# Patient Record
Sex: FEMALE | Race: BLACK OR AFRICAN AMERICAN | Employment: FULL TIME | URBAN - METROPOLITAN AREA
[De-identification: names, ages, dates, MRNs, and addresses within clinical notes are randomized per-mention and may not be internally consistent; named-entity substitution may affect disease eponyms.]

---

## 2018-07-18 ENCOUNTER — ED HISTORICAL/CONVERTED ENCOUNTER (OUTPATIENT)
Dept: OTHER | Age: 48
End: 2018-07-18

## 2018-08-16 ENCOUNTER — OFFICE VISIT (OUTPATIENT)
Dept: NEUROLOGY | Age: 48
End: 2018-08-16

## 2018-08-16 VITALS
HEART RATE: 85 BPM | BODY MASS INDEX: 32.07 KG/M2 | HEIGHT: 63 IN | WEIGHT: 181 LBS | TEMPERATURE: 98 F | OXYGEN SATURATION: 99 % | DIASTOLIC BLOOD PRESSURE: 72 MMHG | SYSTOLIC BLOOD PRESSURE: 120 MMHG

## 2018-08-16 DIAGNOSIS — R20.0 BILATERAL HAND NUMBNESS: ICD-10-CM

## 2018-08-16 DIAGNOSIS — M54.2 NECK PAIN: Primary | ICD-10-CM

## 2018-08-16 DIAGNOSIS — M62.838 TRAPEZIUS MUSCLE SPASM: ICD-10-CM

## 2018-08-16 DIAGNOSIS — M62.838 CERVICAL PARASPINAL MUSCLE SPASM: ICD-10-CM

## 2018-08-16 DIAGNOSIS — M54.12 CERVICAL RADICULOPATHY: ICD-10-CM

## 2018-08-16 RX ORDER — METHOCARBAMOL 750 MG/1
750 TABLET, FILM COATED ORAL AS NEEDED
COMMUNITY
End: 2020-06-09

## 2018-08-16 NOTE — PATIENT INSTRUCTIONS
Information Regarding Testing     If you have physican order for a test or a medication denied by your insurance company, this does not mean the test or medication is not appropriate for you as that is a medical decision, not a decision to be made by an insurance company representative or by an [facility] physician who has not interviewed and examined you. This is a decision to be made between you and your physician. The denial of services is a contractual matter between you and your insurance company, not an issue between your physician and the insurance company. If your test or medication is denied, you can take the following steps to help resolve the issue:    1. File a complaint with the [facility] regarding your insurance company's denial of services ordered for you. You can do this either by calling them directly or by completing an on-line complaint form on the [employer]. This can be found at www.CloudFlare    2. Also file a formal complaint with your insurance company and ask to have the name of the person denying the service so that you may explore a legal option should you be harmed by this denial of service. Again, the fact the insurance company will not pay for the service does not mean it is not medically necessary and I would encourage you to follow through with the plan that was made with your physician    3. File a written complaint with your employer so your employer and benefit manager is aware of the poor coverage they are providing their employees. If you have medicare/medicaid, complain to your representative in the House and to your Puja Gupta.     [facility]   Statement to Patients  April 1, 2014      In an effort to ensure the large volume of patient prescription refills is processed in the most efficient and expeditious manner, we are asking our patients to assist us by calling your Pharmacy for all prescription refills, this will include also your  Mail Order Pharmacy. The pharmacy will contact our office electronically to continue the refill process. Please do not wait until the last minute to call your pharmacy. We need at least 48 hours (2days) to fill prescriptions. We also encourage you to call your pharmacy before going to  your prescription to make sure it is ready. With regard to controlled substance prescription refill requests (narcotic refills) that need to be picked up at our office, we ask your cooperation by providing us with at least 72 hours (3days) notice that you will need a refill. We will not refill narcotic prescription refill requests after 4:00pm on any weekday, Monday through Thursday, or after 2:00pm on Fridays, or on the weekends. We encourage everyone to explore another way of getting your prescription refill request processed using [facility], our patient web portal through our electronic medical record system. [facility] is an efficient and effective way to communicate your medication request directly to the office and  downloadable as an anne marie on your smart phone . [facility] also features a review functionality that allows you to view your medication list as well as leave messages for your physician. Are you ready to get connected? If so please review the attatched instructions or speak to any of our staff to get you set up right away! Thank you so much for your cooperation. Should you have any questions please contact our Practice Administrator. The Physicians and Staff,  [facility]   If we have ordered testing for you, we do not call patients with results and we do not give test results over the phone. We schedule follow up appointments so that your results can be discussed in person and any questions you have regarding them may be addressed.   If something of concern is revealed on your test, we will call you for a sooner follow up appointment. Additionally, results may be found by using the My Chart feature and one of our patient service representatives at the  can give you instructions on how to access this feature of our electronic medical record system.
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
## 2018-08-16 NOTE — MR AVS SNAPSHOT
[address] [facility] [address] [facility] [phone] Patient: Krishna Saldaña MRN: [id number] JSU:[id number] Visit Information Date & Time Provider Department Dept. Phone Encounter #  
 8/16/2018 11:00 AM Keyur Soto MD [facility] Neurology [facility] [phone] [id number] Follow-up Instructions Return for After tests. Allergies as of 8/16/2018  Review Complete On: 8/16/2018 By: Keyur Soto MD  
 No Known Allergies Current Immunizations  Never Reviewed No immunizations on file. Not reviewed this visit You Were Diagnosed With   
  
 Codes Comments Neck pain    -  Primary ICD-10-CM: M54.2 ICD-9-CM: 723.1 Cervical radiculopathy     ICD-10-CM: M54.12 
ICD-9-CM: 723.4 Cervical paraspinal muscle spasm     ICD-10-CM: F37.651 ICD-9-CM: 728.85 Trapezius muscle spasm     ICD-10-CM: R31.221 ICD-9-CM: 728.85 Bilateral hand numbness     ICD-10-CM: R20.0 ICD-9-CM: 914. 0 Vitals BP Pulse Temp Height(growth percentile) Weight(growth percentile) SpO2  
 120/72 85 98 °F (36.7 °C) 5' 3\" (1.6 m) 181 lb (82.1 kg) 99% BMI OB Status Smoking Status 32.06 kg/m2 Having regular periods Never Smoker Vitals History BMI and BSA Data Body Mass Index Body Surface Area 32.06 kg/m 2 1.91 m 2 Your Updated Medication List  
  
   
This list is accurate as of 8/16/18 11:43 AM.  Always use your most recent med list.  
  
  
  
  
 HYDROcodone-acetaminophen 5-500 mg per tablet Commonly known as:  Blima Yaakov Take 1 Tab by mouth every four (4) hours as needed for Pain. methocarbamol 750 mg tablet Commonly known as:  ROBAXIN Take  by mouth four (4) times daily. OTHER Take 2,000 Units by mouth every seven (7) days. Vitamin D every friday We Performed the Following REFERRAL TO PHYSICAL THERAPY [OF3 Custom] Comments: Neck pain, cervical and trap spasm eval and treat Follow-up Instructions Return for After tests. To-Do List   
 08/16/2018 Neurology:  EMG LIMITED   
  
 08/16/2018 Imaging:  MRI CERV SPINE WO CONT Referral Information Referral ID Referred By Referred To  
  
 [id number] KASH GUTHRIE Not Available Visits Status Start Date End Date 1 New Request 8/16/18 8/16/19 If your referral has a status of pending review or denied, additional information will be sent to support the outcome of this decision. Patient Instructions Information Regarding Testing If you have physican order for a test or a medication denied by your insurance company, this does not mean the test or medication is not appropriate for you as that is a medical decision, not a decision to be made by an insurance company representative or by an [facility] physician who has not interviewed and examined you. This is a decision to be made between you and your physician. The denial of services is a contractual matter between you and your insurance company, not an issue between your physician and the insurance company. If your test or medication is denied, you can take the following steps to help resolve the issue: 1. File a complaint with the [facility] regarding your insurance company's denial of services ordered for you. You can do this either by calling them directly or by completing an on-line complaint form on the [employer]. This can be found at www.[place].gov 2. Also file a formal complaint with your insurance company and ask to have the name of the person denying the service so that you may explore a legal option should you be harmed by this denial of service.   Again, the fact the insurance company will not pay for the service does not mean it is not medically necessary and I would encourage you to follow through with the plan that was made with your physician 3. File a written complaint with your employer so your employer and benefit manager is aware of the poor coverage they are providing their employees. If you have medicare/medicaid, complain to your representative in the House and to your Puja Gupta. PRESCRIPTION REFILL POLICY [facility] Statement to Patients April 1, 2014 In an effort to ensure the large volume of patient prescription refills is processed in the most efficient and expeditious manner, we are asking our patients to assist us by calling your Pharmacy for all prescription refills, this will include also your  Mail Order Pharmacy. The pharmacy will contact our office electronically to continue the refill process. Please do not wait until the last minute to call your pharmacy. We need at least 48 hours (2days) to fill prescriptions. We also encourage you to call your pharmacy before going to  your prescription to make sure it is ready. With regard to controlled substance prescription refill requests (narcotic refills) that need to be picked up at our office, we ask your cooperation by providing us with at least 72 hours (3days) notice that you will need a refill. We will not refill narcotic prescription refill requests after 4:00pm on any weekday, Monday through Thursday, or after 2:00pm on Fridays, or on the weekends. We encourage everyone to explore another way of getting your prescription refill request processed using [employer], our patient web portal through our electronic medical record system. [employer] is an efficient and effective way to communicate your medication request directly to the office and  downloadable as an anne marie on your smart phone . [employer] also features a review functionality that allows you to view your medication list as well as leave messages for your physician. Are you ready to get connected?  If so please review the attatched instructions or speak to any of our staff to get you set up right away! Thank you so much for your cooperation. Should you have any questions please contact our Practice Administrator. The Physicians and Staff,  [facility] If we have ordered testing for you, we do not call patients with results and we do not give test results over the phone. We schedule follow up appointments so that your results can be discussed in person and any questions you have regarding them may be addressed. If something of concern is revealed on your test, we will call you for a sooner follow up appointment. Additionally, results may be found by using the My Chart feature and one of our patient service representatives at the  can give you instructions on how to access this feature of our electronic medical record system. Introducing [facility]! [facility] introduces [employer] patient portal. Now you can access parts of your medical record, email your doctor's office, and request medication refills online. 1. In your internet browser, go to [email or web address]. [employer] 2. Click on the First Time User? Click Here link in the Sign In box. You will see the New Member Sign Up page. 3. Enter your [employer] Access Code exactly as it appears below. You will not need to use this code after youve completed the sign-up process. If you do not sign up before the expiration date, you must request a new code. · [employer] Access Code: [email or web address] Expires: 11/14/2018 11:43 AM 
 
4. Enter the last four digits of your Social Security Number (xxxx) and Date of Birth (mm/dd/yyyy) as indicated and click Submit. You will be taken to the next sign-up page. 5. Create a [employer] ID. This will be your [employer] login ID and cannot be changed, so think of one that is secure and easy to remember. 6. Create a [email or web address] password. You can change your password at any time. 7. Enter your Password Reset Question and Answer. This can be used at a later time if you forget your password. 8. Enter your e-mail address. You will receive e-mail notification when new information is available in [address] 9. Click Sign Up. You can now view and download portions of your medical record. 10. Click the Download Summary menu link to download a portable copy of your medical information. If you have questions, please visit the Frequently Asked Questions section of the [email or web address] website. Remember, [email or web address] is NOT to be used for urgent needs. For medical emergencies, dial 911. Now available from your iPhone and Android! Please provide this summary of care documentation to your next provider. Your primary care clinician is listed as [place]. If you have any questions after today's visit, please call [phone].
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
## 2018-08-27 ENCOUNTER — OFFICE VISIT (OUTPATIENT)
Dept: NEUROLOGY | Age: 48
End: 2018-08-27

## 2018-08-27 DIAGNOSIS — R20.0 NUMBNESS AND TINGLING IN BOTH HANDS: Primary | ICD-10-CM

## 2018-08-27 DIAGNOSIS — R20.2 NUMBNESS AND TINGLING IN BOTH HANDS: Primary | ICD-10-CM
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
## 2018-08-30 ENCOUNTER — HOSPITAL ENCOUNTER (OUTPATIENT)
Dept: PHYSICAL THERAPY | Age: 48
Discharge: HOME | End: 2018-08-30
Payer: COMMERCIAL

## 2018-08-30 PROCEDURE — 97161 PT EVAL LOW COMPLEX 20 MIN: CPT | Performed by: PHYSICAL THERAPIST

## 2018-08-30 PROCEDURE — 97014 ELECTRIC STIMULATION THERAPY: CPT | Performed by: PHYSICAL THERAPIST

## 2018-08-30 PROCEDURE — 97110 THERAPEUTIC EXERCISES: CPT | Performed by: PHYSICAL THERAPIST
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
## 2018-09-04 ENCOUNTER — HOSPITAL ENCOUNTER (OUTPATIENT)
Dept: MRI IMAGING | Age: 48
Discharge: HOME | End: 2018-09-04
Attending: PSYCHIATRY & NEUROLOGY
Payer: COMMERCIAL

## 2018-09-04 ENCOUNTER — HOSPITAL ENCOUNTER (OUTPATIENT)
Dept: PHYSICAL THERAPY | Age: 48
Discharge: HOME | End: 2018-09-04
Payer: COMMERCIAL

## 2018-09-04 DIAGNOSIS — M62.838 TRAPEZIUS MUSCLE SPASM: ICD-10-CM

## 2018-09-04 DIAGNOSIS — M62.838 CERVICAL PARASPINAL MUSCLE SPASM: ICD-10-CM

## 2018-09-04 DIAGNOSIS — R20.0 BILATERAL HAND NUMBNESS: ICD-10-CM

## 2018-09-04 DIAGNOSIS — M54.12 CERVICAL RADICULOPATHY: ICD-10-CM

## 2018-09-04 DIAGNOSIS — M54.2 NECK PAIN: ICD-10-CM

## 2018-09-04 PROCEDURE — 97110 THERAPEUTIC EXERCISES: CPT | Performed by: PHYSICAL THERAPIST

## 2018-09-04 PROCEDURE — 72141 MRI NECK SPINE W/O DYE: CPT

## 2018-09-04 PROCEDURE — 97014 ELECTRIC STIMULATION THERAPY: CPT | Performed by: PHYSICAL THERAPIST

## 2018-09-04 NOTE — PROGRESS NOTES
PT DAILY TREATMENT NOTE - [facility] 2-15 Patient Name: Nikita Gutierrez Date:2018 : 1970 [x]  Patient  Verified Payor: BLUE CROSS / Plan: [facility] PPO / Product Type: PPO / In time:9:30 am  Out time:10:30 am 
Total Treatment Time (min): 60 Total Timed Codes (min): 45 
1:1 Treatment Time ( [address] only): 20 Visit #: 2 Treatment Area: Neck pain [M54.2] SUBJECTIVE Pain Level (0-10 scale): 610 Any medication changes, allergies to medications, adverse drug reactions, diagnosis change, or new procedure performed?: [x] No    [] Yes (see summary sheet for update) Subjective functional status/changes:   [] No changes reported Pt reports that she feels a little better but she is having a headache, neck tightness and tightness in her mid to upper back. OBJECTIVE Modality rationale: decrease pain and increase tissue extensibility to improve the patients ability to perform cervical AROM without pain Min Type Additional Details 15 [x] Estim: []Att   [x]Unatt        []TENS instruct []IFC  []Premod   []NMES []Other:  []w/US   []w/ice   [x]w/heat Position: supine Location: entire back and neck []  Traction: [] Cervical       []Lumbar 
                     [] Prone          []Supine []Intermittent   []Continuous Lbs: 
[] before manual 
[] after manual 
[]w/heat  
 []  Ultrasound: []Continuous   [] Pulsed at: 
                         []1MHz   []3MHz Location: 
W/cm2:  
 [] Paraffin Location:  
[]w/heat  
 []  Ice     []  Heat 
[]  Ice massage Position: Location:  
 []  Laser 
[]  Other: Position: Location:  
 
 []  Vasopneumatic Device Pressure:       [] lo [] med [] hi  
Temperature:   
 
[x] Skin assessment post-treatment:  [x]intact [x]redness- no adverse reaction 
  []redness  adverse reaction:  
 
40 min Therapeutic Exercise:  [x] See flow sheet :  
 Rationale: increase ROM, increase strength, increase proprioception and posture to improve the patients ability to sit without pain present With 
 [x] TE 
 [] TA 
 [] neuro 
 [] other: Patient Education: [x] Review HEP [] Progressed/Changed HEP based on:  
[] positioning   [] body mechanics   [] transfers   [] heat/ice application   
[] other:   
 
Other Objective/Functional Measures: no change since initial evaluation Pain Level (0-10 scale) post treatment: 3/10 ASSESSMENT/Changes in Function:  
 
Patient will continue to benefit from skilled PT services to modify and progress therapeutic interventions, address functional mobility deficits, address ROM deficits, address strength deficits, analyze and address soft tissue restrictions, analyze and cue movement patterns, analyze and modify body mechanics/ergonomics, assess and modify postural abnormalities and instruct in home and community integration to attain remaining goals. [x]  See Plan of Care 
[]  See progress note/recertification 
[]  See Discharge Summary Progress towards goals / Updated goals: 
Pt has been compliant with HEP since initial visit. Pt requires v.c. For relaxation of shoulders when in sitting position. Pt appears to be anxious regarding current condition. PLAN 
[]  Upgrade activities as tolerated     [x]  Continue plan of care [x]  Update interventions per flow sheet      
[]  Discharge due to:_ 
[]  Other:_ Camille Sharpe PT, DPT, [facility] 9/4/2018  3:10 PM

## 2018-09-05 ENCOUNTER — TELEPHONE (OUTPATIENT)
Dept: NEUROLOGY | Age: 48
End: 2018-09-05

## 2018-09-05 NOTE — TELEPHONE ENCOUNTER
Spoke with patient and advised that this office does not give out work notes. Suggested she follow up with therapy as to her limitations, if any, at work. Advised that MRI results will be given at follow up appointment on 9/19. Patient verbalized understanding.

## 2018-09-05 NOTE — TELEPHONE ENCOUNTER
----- Message from [facility] sent at 9/5/2018  8:48 AM EDT -----  Regarding: Dr. Eileen Sprague  Pt wanted to know if the doctor could write a doctor's excuse for work until her PT is over and pt is checking on the results of her MRI? Pt best contact [phone]. Pt's job [employer].

## 2018-09-10 ENCOUNTER — HOSPITAL ENCOUNTER (OUTPATIENT)
Dept: PHYSICAL THERAPY | Age: 48
Discharge: HOME | End: 2018-09-10
Payer: COMMERCIAL

## 2018-09-10 PROCEDURE — 97110 THERAPEUTIC EXERCISES: CPT | Performed by: PHYSICAL THERAPIST

## 2018-09-10 PROCEDURE — 97014 ELECTRIC STIMULATION THERAPY: CPT | Performed by: PHYSICAL THERAPIST
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
## 2018-09-14 ENCOUNTER — HOSPITAL ENCOUNTER (OUTPATIENT)
Dept: PHYSICAL THERAPY | Age: 48
Discharge: HOME | End: 2018-09-14
Payer: COMMERCIAL

## 2018-09-14 PROCEDURE — 97014 ELECTRIC STIMULATION THERAPY: CPT | Performed by: PHYSICAL THERAPIST

## 2018-09-14 PROCEDURE — 97110 THERAPEUTIC EXERCISES: CPT | Performed by: PHYSICAL THERAPIST

## 2018-09-14 PROCEDURE — 97140 MANUAL THERAPY 1/> REGIONS: CPT | Performed by: PHYSICAL THERAPIST

## 2018-09-14 NOTE — PROGRESS NOTES
PT DAILY TREATMENT NOTE - [facility] 2-15 Patient Name: Norma Graves Date:2018 : 1970 [x]  Patient  Verified Payor: BLUE CROSS / Plan: [facility] PPO / Product Type: PPO / In time: 11:30 am  Out time: 12:35 pm 
Total Treatment Time (min): 65 Total Timed Codes (min): 45 
1:1 Treatment Time ( only): 40 Visit #: 4 Treatment Area: Neck pain [M54.2] SUBJECTIVE Pain Level (0-10 scale): 5/10 Any medication changes, allergies to medications, adverse drug reactions, diagnosis change, or new procedure performed?: [x] No    [] Yes (see summary sheet for update) Subjective functional status/changes:   [] No changes reported Pt reports that she is still having some head and neck pain. She might feel a little bit better. OBJECTIVE Modality rationale: decrease pain and increase tissue extensibility to improve the patients ability to perform cervical AROM without pain Min Type Additional Details 15 [x] Estim: []Att   [x]Unatt        []TENS instruct []IFC  []Premod   []NMES []Other:  []w/US   []w/ice   [x]w/heat Position: supine Location: entire back and neck []  Traction: [] Cervical       []Lumbar 
                     [] Prone          []Supine []Intermittent   []Continuous Lbs: 
[] before manual 
[] after manual 
[]w/heat  
 []  Ultrasound: []Continuous   [] Pulsed at: 
                         []1MHz   []3MHz Location: 
W/cm2:  
 [] Paraffin Location:  
[]w/heat  
 []  Ice     []  Heat 
[]  Ice massage Position: Location:  
 []  Laser 
[]  Other: Position: Location:  
 
 []  Vasopneumatic Device Pressure:       [] lo [] med [] hi  
Temperature:   
 
[x] Skin assessment post-treatment:  [x]intact [x]redness- no adverse reaction 
  []redness  adverse reaction:  
 
35 min Therapeutic Exercise:  [x] See flow sheet :  
Rationale: increase ROM, increase strength, increase proprioception and posture to improve the patients ability to sit without pain present 15 min Manual Therapy  STM and MFR to bilateral UT, levator scap and scalenes Rationale: increase ROM, increase strength, increase proprioception and posture to improve the patients ability to sit without pain present With 
 [x] TE 
 [] TA 
 [] neuro 
 [] other: Patient Education: [x] Review HEP [] Progressed/Changed HEP based on:  
[] positioning   [] body mechanics   [] transfers   [] heat/ice application   
[] other:   
 
Other Objective/Functional Measures: Hypertonicity present bilateral UT, levator scap and scalenes Posture with bilateral shoulder elevation in sitting Pain Level (0-10 scale) post treatment: 3/10 ASSESSMENT/Changes in Function:  
 
Patient will continue to benefit from skilled PT services to modify and progress therapeutic interventions, address functional mobility deficits, address ROM deficits, address strength deficits, analyze and address soft tissue restrictions, analyze and cue movement patterns, analyze and modify body mechanics/ergonomics, assess and modify postural abnormalities and instruct in home and community integration to attain remaining goals. [x]  See Plan of Care from first visi 
[]  See progress note/recertification 
[]  See Discharge Summary Progress towards goals / Updated goals: 
Pt has been compliant with HEP since initial visit and is progressing slowly with ROM and postural control. PLAN 
[]  Upgrade activities as tolerated     [x]  Continue plan of care [x]  Update interventions per flow sheet      
[]  Discharge due to:_ 
[]  Other:_ Sheela Triana, PT, DPT, [facility] 9/14/2018  3:10 PM

## 2018-09-17 ENCOUNTER — HOSPITAL ENCOUNTER (OUTPATIENT)
Dept: PHYSICAL THERAPY | Age: 48
Discharge: HOME | End: 2018-09-17
Payer: COMMERCIAL

## 2018-09-17 PROCEDURE — 97014 ELECTRIC STIMULATION THERAPY: CPT | Performed by: PHYSICAL MEDICINE & REHABILITATION

## 2018-09-17 PROCEDURE — 97110 THERAPEUTIC EXERCISES: CPT | Performed by: PHYSICAL MEDICINE & REHABILITATION

## 2018-09-17 PROCEDURE — 97140 MANUAL THERAPY 1/> REGIONS: CPT | Performed by: PHYSICAL MEDICINE & REHABILITATION

## 2018-09-17 NOTE — PROGRESS NOTES
PT DAILY TREATMENT NOTE - [facility] 2-15 Patient Name: Debby Livingston Date:2018 : 1970 [x]  Patient  Verified Payor: BLUE CROSS / Plan: [facility] PPO / Product Type: PPO / In time: 210 pm  Out time: 320 pm 
Total Treatment Time (min): 65 Total Timed Codes (min): 45 
1:1 Treatment Time ( only): 40 Visit #: 2 Treatment Area: Neck pain [M54.2] SUBJECTIVE Pain Level (0-10 scale): 4/10 Any medication changes, allergies to medications, adverse drug reactions, diagnosis change, or new procedure performed?: [x] No    [] Yes (see summary sheet for update) Subjective functional status/changes:   [] No changes reported Pt reports that no major changes. OBJECTIVE Modality rationale: decrease pain and increase tissue extensibility to improve the patients ability to perform cervical AROM without pain Min Type Additional Details 15 [x] Estim: []Att   [x]Unatt        []TENS instruct [x]IFC  []Premod   []NMES []Other:  []w/US   []w/ice   [x]w/heat Position: supine Location: neck []  Traction: [] Cervical       []Lumbar 
                     [] Prone          []Supine []Intermittent   []Continuous Lbs: 
[] before manual 
[] after manual 
[]w/heat  
 []  Ultrasound: []Continuous   [] Pulsed at: 
                         []1MHz   []3MHz Location: 
W/cm2:  
 [] Paraffin Location:  
[]w/heat  
 []  Ice     []  Heat 
[]  Ice massage Position: Location:  
 []  Laser 
[]  Other: Position: Location:  
 
 []  Vasopneumatic Device Pressure:       [] lo [] med [] hi  
Temperature:   
 
[x] Skin assessment post-treatment:  [x]intact [x]redness- no adverse reaction 
  []redness  adverse reaction:  
 
40 min Therapeutic Exercise:  [x] See flow sheet :  
Rationale: increase ROM, increase strength, increase proprioception and posture to improve the patients ability to sit without pain present 15 min Manual Therapy  Cervical Traction, UT stretch, SOR Rationale: increase ROM, increase strength, increase proprioception and posture to improve the patients ability to sit without pain present With 
 [x] TE 
 [] TA 
 [] neuro 
 [] other: Patient Education: [x] Review HEP [] Progressed/Changed HEP based on:  
[] positioning   [] body mechanics   [] transfers   [] heat/ice application   
[] other:   
 
Other Objective/Functional Measures: No increase in pain with today's exercises, Updated HEP Pain Level (0-10 scale) post treatment: 1/10 ASSESSMENT/Changes in Function:  
 
Patient will continue to benefit from skilled PT services to modify and progress therapeutic interventions, address functional mobility deficits, address ROM deficits, address strength deficits, analyze and address soft tissue restrictions, analyze and cue movement patterns, analyze and modify body mechanics/ergonomics, assess and modify postural abnormalities and instruct in home and community integration to attain remaining goals. []  See Plan of Care  
[]  See progress note/recertification 
[]  See Discharge Summary Progress towards goals / Updated goals: 
Pt is progressing well with improved ADLs but continued pain throughout neck and head. Will continue to progress as tolerated. PLAN [x]  Upgrade activities as tolerated     [x]  Continue plan of care [x]  Update interventions per flow sheet      
[]  Discharge due to:_ 
[]  Other:_ Marylu Barnard PTA, CPT 9/17/2018  3:10 PM

## 2018-09-19 ENCOUNTER — OFFICE VISIT (OUTPATIENT)
Dept: NEUROLOGY | Age: 48
End: 2018-09-19

## 2018-09-19 VITALS
BODY MASS INDEX: 32.07 KG/M2 | HEIGHT: 63 IN | WEIGHT: 181 LBS | DIASTOLIC BLOOD PRESSURE: 84 MMHG | SYSTOLIC BLOOD PRESSURE: 138 MMHG

## 2018-09-19 DIAGNOSIS — M54.2 NECK PAIN: Primary | ICD-10-CM

## 2018-09-19 DIAGNOSIS — R51.9 INTRACTABLE HEADACHE, UNSPECIFIED CHRONICITY PATTERN, UNSPECIFIED HEADACHE TYPE: ICD-10-CM

## 2018-09-19 RX ORDER — GABAPENTIN 300 MG/1
300 CAPSULE ORAL 3 TIMES DAILY
Qty: 90 CAP | Refills: 5 | Status: SHIPPED | OUTPATIENT
Start: 2018-09-19 | End: 2018-12-06

## 2018-09-19 RX ORDER — ACETAMINOPHEN 500 MG
1000 TABLET ORAL 2 TIMES DAILY
COMMUNITY

## 2018-09-19 NOTE — PATIENT INSTRUCTIONS
[facility]   Statement to Patients  April 1, 2014      In an effort to ensure the large volume of patient prescription refills is processed in the most efficient and expeditious manner, we are asking our patients to assist us by calling your Pharmacy for all prescription refills, this will include also your  Mail Order Pharmacy. The pharmacy will contact our office electronically to continue the refill process. Please do not wait until the last minute to call your pharmacy. We need at least 48 hours (2days) to fill prescriptions. We also encourage you to call your pharmacy before going to  your prescription to make sure it is ready. With regard to controlled substance prescription refill requests (narcotic refills) that need to be picked up at our office, we ask your cooperation by providing us with at least 72 hours (3days) notice that you will need a refill. We will not refill narcotic prescription refill requests after 4:00pm on any weekday, Monday through Thursday, or after 2:00pm on Fridays, or on the weekends. We encourage everyone to explore another way of getting your prescription refill request processed using [employer], our patient web portal through our electronic medical record system. [employer] is an efficient and effective way to communicate your medication request directly to the office and  downloadable as an anne marie on your smart phone . [employer] also features a review functionality that allows you to view your medication list as well as leave messages for your physician. Are you ready to get connected? If so please review the attatched instructions or speak to any of our staff to get you set up right away! Thank you so much for your cooperation. Should you have any questions please contact our Practice Administrator.     The Physicians and Staff,  [facility] Pt reports productive green cough for several days, denies fevers, also states he has been missing his insulin for about 3 days.
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
## 2018-09-19 NOTE — LETTER
9/19/2018 10:56 AM 
 
Ms. Olimpia Dalal 
[address] [address] [facility] [address] [id number] To Whom It May Concern,  
 
 
Ms. Olimpia Dalal is currently under the care of [facility]. Due to her current medical condition she should stay out of work until her symptoms can resolve further. If you have any questions please contact our office. Sincerely, Isaias Mejia

## 2018-09-20 ENCOUNTER — HOSPITAL ENCOUNTER (OUTPATIENT)
Dept: PHYSICAL THERAPY | Age: 48
Discharge: HOME | End: 2018-09-20
Payer: COMMERCIAL

## 2018-09-20 PROCEDURE — 97014 ELECTRIC STIMULATION THERAPY: CPT | Performed by: PHYSICAL MEDICINE & REHABILITATION

## 2018-09-20 PROCEDURE — 97110 THERAPEUTIC EXERCISES: CPT | Performed by: PHYSICAL MEDICINE & REHABILITATION

## 2018-09-20 NOTE — PROGRESS NOTES
PT DAILY TREATMENT NOTE - [facility] 2-15 Patient Name: Dee Us Date:2018 : 1970 [x]  Patient  Verified Payor: BLUE CROSS / Plan: [facility] PPO / Product Type: PPO / In time: 1100 am  Out time: 1200 pm 
Total Treatment Time (min): 60 Total Timed Codes (min): 45 
1:1 Treatment Time ( only): 45 Visit #: 6 Treatment Area: Neck pain [M54.2] SUBJECTIVE Pain Level (0-10 scale): 4/10 Any medication changes, allergies to medications, adverse drug reactions, diagnosis change, or new procedure performed?: [x] No    [] Yes (see summary sheet for update) Subjective functional status/changes:   [] No changes reported Pt reports that she continues to have pain. OBJECTIVE Modality rationale: decrease pain and increase tissue extensibility to improve the patients ability to perform cervical AROM without pain Min Type Additional Details 15 [x] Estim: []Att   [x]Unatt        []TENS instruct [x]IFC  []Premod   []NMES []Other:  []w/US   []w/ice   [x]w/heat Position: supine Location: neck []  Traction: [] Cervical       []Lumbar 
                     [] Prone          []Supine []Intermittent   []Continuous Lbs: 
[] before manual 
[] after manual 
[]w/heat  
 []  Ultrasound: []Continuous   [] Pulsed at: 
                         []1MHz   []3MHz Location: 
W/cm2:  
 [] Paraffin Location:  
[]w/heat  
 []  Ice     []  Heat 
[]  Ice massage Position: Location:  
 []  Laser 
[]  Other: Position: Location:  
 
 []  Vasopneumatic Device Pressure:       [] lo [] med [] hi  
Temperature:   
 
[x] Skin assessment post-treatment:  [x]intact [x]redness- no adverse reaction 
  []redness  adverse reaction:  
 
45 min Therapeutic Exercise:  [x] See flow sheet :  
Rationale: increase ROM, increase strength, increase proprioception and posture to improve the patients ability to sit without pain present With 
 [x] TE 
 [] TA 
 [] neuro 
 [] other: Patient Education: [x] Review HEP [] Progressed/Changed HEP based on:  
[] positioning   [] body mechanics   [] transfers   [] heat/ice application   
[] other:   
 
Other Objective/Functional Measures: No increase in pain with today's exercises, Updated HEP Pain Level (0-10 scale) post treatment: 3/10 ASSESSMENT/Changes in Function:  
 
Patient will continue to benefit from skilled PT services to modify and progress therapeutic interventions, address functional mobility deficits, address ROM deficits, address strength deficits, analyze and address soft tissue restrictions, analyze and cue movement patterns, analyze and modify body mechanics/ergonomics, assess and modify postural abnormalities and instruct in home and community integration to attain remaining goals. []  See Plan of Care  
[]  See progress note/recertification 
[]  See Discharge Summary Progress towards goals / Updated goals: 
Pt is progressing well with improved ADLs but continued pain throughout neck and head. Will continue to progress as tolerated. PLAN [x]  Upgrade activities as tolerated     [x]  Continue plan of care [x]  Update interventions per flow sheet      
[]  Discharge due to:_ 
[]  Other:_ Errol Banuelos PTA, CPT 9/20/2018  3:10 PM

## 2018-09-21 ENCOUNTER — TELEPHONE (OUTPATIENT)
Dept: NEUROLOGY | Age: 48
End: 2018-09-21

## 2018-09-21 NOTE — TELEPHONE ENCOUNTER
Pt has questions? Pt wants to know is it ok to be walking up n down steps,  bending   How much lifting is pt able    Should pt be walking or standing up   Pt goes to physical therpy, is it alright to get head and neck massages.   Having Vision issue, pt wants to know should she go to the eye doctor

## 2018-09-24 ENCOUNTER — HOSPITAL ENCOUNTER (OUTPATIENT)
Dept: PHYSICAL THERAPY | Age: 48
Discharge: HOME | End: 2018-09-24
Payer: COMMERCIAL

## 2018-09-24 PROCEDURE — 97140 MANUAL THERAPY 1/> REGIONS: CPT | Performed by: PHYSICAL MEDICINE & REHABILITATION

## 2018-09-24 PROCEDURE — 97014 ELECTRIC STIMULATION THERAPY: CPT | Performed by: PHYSICAL MEDICINE & REHABILITATION

## 2018-09-24 PROCEDURE — 97110 THERAPEUTIC EXERCISES: CPT | Performed by: PHYSICAL MEDICINE & REHABILITATION

## 2018-09-24 NOTE — PROGRESS NOTES
PT DAILY TREATMENT NOTE - [facility] 2-15 Patient Name: Paula Benoit Date:2018 : 1970 [x]  Patient  Verified Payor: BLUE CROSS / Plan: [facility] PPO / Product Type: PPO / In time: 1030 am  Out time: 1135 am 
Total Treatment Time (min): 65 Total Timed Codes (min): 50 
1:1 Treatment Time (MC only): 50 Visit #: 6 Treatment Area: Neck pain [M54.2] SUBJECTIVE Pain Level (0-10 scale): 4/10 Any medication changes, allergies to medications, adverse drug reactions, diagnosis change, or new procedure performed?: [x] No    [] Yes (see summary sheet for update) Subjective functional status/changes:   [] No changes reported Pt reports that she feels about the same with pressure up into the head. OBJECTIVE Modality rationale: decrease pain and increase tissue extensibility to improve the patients ability to perform cervical AROM without pain Min Type Additional Details 15 [x] Estim: []Att   [x]Unatt        []TENS instruct [x]IFC  []Premod   []NMES []Other:  []w/US   []w/ice   [x]w/heat Position: supine Location: neck []  Traction: [] Cervical       []Lumbar 
                     [] Prone          []Supine []Intermittent   []Continuous Lbs: 
[] before manual 
[] after manual 
[]w/heat  
 []  Ultrasound: []Continuous   [] Pulsed at: 
                         []1MHz   []3MHz Location: 
W/cm2:  
 [] Paraffin Location:  
[]w/heat  
 []  Ice     []  Heat 
[]  Ice massage Position: Location:  
 []  Laser 
[]  Other: Position: Location:  
 
 []  Vasopneumatic Device Pressure:       [] lo [] med [] hi  
Temperature:   
 
[x] Skin assessment post-treatment:  [x]intact [x]redness- no adverse reaction 
  []redness  adverse reaction:  
 
40 min Therapeutic Exercise:  [x] See flow sheet :  
Rationale: increase ROM, increase strength, increase proprioception and posture to improve the patients ability to sit without pain present 10 min Manual Therapy  Cervical Traction, UT stretch, SOR Rationale: increase ROM, increase strength, increase proprioception and posture to improve the patients ability to sit without pain present With 
 [x] TE 
 [] TA 
 [] neuro 
 [] other: Patient Education: [x] Review HEP [] Progressed/Changed HEP based on:  
[] positioning   [] body mechanics   [] transfers   [] heat/ice application   
[] other:   
 
Other Objective/Functional Measures: No increase in pain with today's exercises Pain Level (0-10 scale) post treatment: 0/10 ASSESSMENT/Changes in Function:  
 
Patient will continue to benefit from skilled PT services to modify and progress therapeutic interventions, address functional mobility deficits, address ROM deficits, address strength deficits, analyze and address soft tissue restrictions, analyze and cue movement patterns, analyze and modify body mechanics/ergonomics, assess and modify postural abnormalities and instruct in home and community integration to attain remaining goals. []  See Plan of Care  
[]  See progress note/recertification 
[]  See Discharge Summary Progress towards goals / Updated goals: 
Pt is progressing well with improved ADLs but continued pain throughout neck and head. Will continue to progress as tolerated. PLAN [x]  Upgrade activities as tolerated     [x]  Continue plan of care [x]  Update interventions per flow sheet      
[]  Discharge due to:_ 
[]  Other:_ Adelina Lindsey PTA, CPT 9/24/2018  3:10 PM

## 2018-09-24 NOTE — TELEPHONE ENCOUNTER
Called and spoke to patient. She states understanding.    Per NP:  Yes to all   Minimize lifting heavy and over head (Routing comment)

## 2018-09-27 ENCOUNTER — HOSPITAL ENCOUNTER (OUTPATIENT)
Dept: PHYSICAL THERAPY | Age: 48
Discharge: HOME | End: 2018-09-27
Payer: COMMERCIAL

## 2018-09-27 ENCOUNTER — TELEPHONE (OUTPATIENT)
Dept: NEUROLOGY | Age: 48
End: 2018-09-27

## 2018-09-27 PROCEDURE — 97110 THERAPEUTIC EXERCISES: CPT | Performed by: PHYSICAL MEDICINE & REHABILITATION

## 2018-09-27 PROCEDURE — 97014 ELECTRIC STIMULATION THERAPY: CPT | Performed by: PHYSICAL MEDICINE & REHABILITATION

## 2018-09-27 PROCEDURE — 97140 MANUAL THERAPY 1/> REGIONS: CPT | Performed by: PHYSICAL MEDICINE & REHABILITATION

## 2018-09-27 NOTE — TELEPHONE ENCOUNTER
Pt called to let you know her work faxed over a form on 09/26/18 that the NP needs to complete. She just wanted me to let you know.  Best contact [phone]

## 2018-09-27 NOTE — PROGRESS NOTES
PT DAILY TREATMENT NOTE - [facility] 2-15 Patient Name: Jeff Sepulveda Date:2018 : 1970 [x]  Patient  Verified Payor: BLUE CROSS / Plan: [facility] PPO / Product Type: PPO / In time: 1030 am  Out time: 1140 am 
Total Treatment Time (min): 70 Total Timed Codes (min): 55 
1:1 Treatment Time ( only): 55 Visit #: 2 Treatment Area: Neck pain [M54.2] SUBJECTIVE Pain Level (0-10 scale): 4/10 Any medication changes, allergies to medications, adverse drug reactions, diagnosis change, or new procedure performed?: [x] No    [] Yes (see summary sheet for update) Subjective functional status/changes:   [] No changes reported Pt reports that she will be following up her MD later today. OBJECTIVE Modality rationale: decrease pain and increase tissue extensibility to improve the patients ability to perform cervical AROM without pain Min Type Additional Details 15 [x] Estim: []Att   [x]Unatt        []TENS instruct [x]IFC  []Premod   []NMES []Other:  []w/US   []w/ice   [x]w/heat Position: supine Location: neck []  Traction: [] Cervical       []Lumbar 
                     [] Prone          []Supine []Intermittent   []Continuous Lbs: 
[] before manual 
[] after manual 
[]w/heat  
 []  Ultrasound: []Continuous   [] Pulsed at: 
                         []1MHz   []3MHz Location: 
W/cm2:  
 [] Paraffin Location:  
[]w/heat  
 []  Ice     []  Heat 
[]  Ice massage Position: Location:  
 []  Laser 
[]  Other: Position: Location:  
 
 []  Vasopneumatic Device Pressure:       [] lo [] med [] hi  
Temperature:   
 
[x] Skin assessment post-treatment:  [x]intact [x]redness- no adverse reaction 
  []redness  adverse reaction:  
 
45 min Therapeutic Exercise:  [x] See flow sheet :  
Rationale: increase ROM, increase strength, increase proprioception and posture to improve the patients ability to sit without pain present 10 min Manual Therapy : STM to R UT/levator Rationale: increase ROM, increase strength, increase proprioception and posture to improve the patients ability to sit without pain present With 
 [x] TE 
 [] TA 
 [] neuro 
 [] other: Patient Education: [x] Review HEP [] Progressed/Changed HEP based on:  
[] positioning   [] body mechanics   [] transfers   [] heat/ice application   
[] other:   
 
Other Objective/Functional Measures: No increase in pain with today's exercises Pain Level (0-10 scale) post treatment: 1/10 ASSESSMENT/Changes in Function:  
 
Patient will continue to benefit from skilled PT services to modify and progress therapeutic interventions, address functional mobility deficits, address ROM deficits, address strength deficits, analyze and address soft tissue restrictions, analyze and cue movement patterns, analyze and modify body mechanics/ergonomics, assess and modify postural abnormalities and instruct in home and community integration to attain remaining goals. []  See Plan of Care  
[]  See progress note/recertification 
[]  See Discharge Summary Progress towards goals / Updated goals: 
Pt is progressing well with improved ADLs but continued pain throughout neck and head. Will continue to progress as tolerated. PLAN [x]  Upgrade activities as tolerated     [x]  Continue plan of care [x]  Update interventions per flow sheet      
[]  Discharge due to:_ 
[]  Other:_ Sin Altamirano PTA, CPT 9/27/2018  3:10 PM

## 2018-10-01 ENCOUNTER — HOSPITAL ENCOUNTER (OUTPATIENT)
Dept: PHYSICAL THERAPY | Age: 48
Discharge: HOME | End: 2018-10-01
Payer: COMMERCIAL

## 2018-10-01 PROCEDURE — 97014 ELECTRIC STIMULATION THERAPY: CPT | Performed by: PHYSICAL THERAPIST

## 2018-10-01 PROCEDURE — 97110 THERAPEUTIC EXERCISES: CPT | Performed by: PHYSICAL THERAPIST

## 2018-10-01 PROCEDURE — 97140 MANUAL THERAPY 1/> REGIONS: CPT | Performed by: PHYSICAL THERAPIST

## 2018-10-01 NOTE — PROGRESS NOTES
PT DAILY TREATMENT NOTE - [facility] 2-15    Patient Name: Silvana Dorado  Date:10/1/2018  : 1970  [x]  Patient  Verified  Payor: Jimmy Gaytan / Plan: [id number] PPO / Product Type: PPO /    In time: 11:30 am  Out time: 12:40 pm  Total Treatment Time (min): 70  Total Timed Codes (min): 55  1:1 Treatment Time ( only): 45  Visit #: 8    Treatment Area: Neck pain [M54.2]    SUBJECTIVE  Pain Level (0-10 scale): 3/10  Any medication changes, allergies to medications, adverse drug reactions, diagnosis change, or new procedure performed?: [x] No    [] Yes (see summary sheet for update)  Subjective functional status/changes:   [] No changes reported  Pt reports that she will be getting some injections for her neck. The doctor said that if those didn't work she might need surgery.     OBJECTIVE    Modality rationale: decrease pain and increase tissue extensibility to improve the patients ability to perform cervical AROM without pain   Min Type Additional Details   15 [x] Estim: []Att   [x]Unatt        []TENS instruct                  [x]IFC  []Premod   []NMES                     []Other:  []w/US   []w/ice   [x]w/heat  Position: supine  Location: neck    []  Traction: [] Cervical       []Lumbar                       [] Prone          []Supine                       []Intermittent   []Continuous Lbs:  [] before manual  [] after manual  []w/heat    []  Ultrasound: []Continuous   [] Pulsed at:                           []1MHz   []3MHz Location:  W/cm2:    [] Paraffin         Location:   []w/heat    []  Ice     []  Heat  []  Ice massage Position:  Location:    []  Laser  []  Other: Position:  Location:      []  Vasopneumatic Device Pressure:       [] lo [] med [] hi   Temperature:      [x] Skin assessment post-treatment:  [x]intact [x]redness- no adverse reaction    []redness - adverse reaction:     45 min Therapeutic Exercise:  [x] See flow sheet :   Rationale: increase ROM, increase strength, increase proprioception and posture to improve the patients ability to sit without pain present    10 min Manual Therapy : STM to R UT/levator, scalenes and occiput area   Rationale: increase ROM, increase strength, increase proprioception and posture to improve the patients ability to sit without pain present          With   [x] TE   [] TA   [] neuro   [] other: Patient Education: [x] Review HEP    [] Progressed/Changed HEP based on:   [] positioning   [] body mechanics   [] transfers   [] heat/ice application    [] other:      Other Objective/Functional Measures:   Hypertonicity bilateral UT, levator scap and scalenes reduced as compared to initial evaluation. Pain Level (0-10 scale) post treatment: 1/10    ASSESSMENT/Changes in Function:     Patient will continue to benefit from skilled PT services to modify and progress therapeutic interventions, address functional mobility deficits, address ROM deficits, address strength deficits, analyze and address soft tissue restrictions, analyze and cue movement patterns, analyze and modify body mechanics/ergonomics, assess and modify postural abnormalities and instruct in home and community integration to attain remaining goals. []  See Plan of Care   []  See progress note/recertification  []  See Discharge Summary         Progress towards goals / Updated goals:  Pt is tolerating treatment well with good effort noted. MM hypertonicity has significantly reduced in bilateral UT, levator scap and scalenes.     PLAN  [x]  Upgrade activities as tolerated     [x]  Continue plan of care  [x]  Update interventions per flow sheet       []  Discharge due to:_  []  Other:_        Jennifer Lepe, PT, DPT, [facility] 10/1/2018  3:10 PM

## 2018-10-02 ENCOUNTER — TELEPHONE (OUTPATIENT)
Dept: NEUROLOGY | Age: 48
End: 2018-10-02

## 2018-10-02 NOTE — TELEPHONE ENCOUNTER
----- Message from Lashae Ravi sent at 10/2/2018  1:22 PM EDT -----  Regarding: Gareth/Telephone  Emily Mariee called from [address] [place] [address] requesting a call back in regards to disability paperwork for the pt. mEily Mariee is inquiring classification start date for the pt stop date for work and is confirming if it is September 4th. Best contact number is [phone] ext [phone] .

## 2018-10-04 ENCOUNTER — HOSPITAL ENCOUNTER (OUTPATIENT)
Dept: PHYSICAL THERAPY | Age: 48
Discharge: HOME | End: 2018-10-04
Payer: COMMERCIAL

## 2018-10-04 DIAGNOSIS — M54.2 NECK PAIN: Primary | ICD-10-CM

## 2018-10-04 PROCEDURE — 97110 THERAPEUTIC EXERCISES: CPT | Performed by: PHYSICAL MEDICINE & REHABILITATION

## 2018-10-04 NOTE — PROGRESS NOTES
PT DAILY TREATMENT NOTE - [facility] 2-15    Patient Name: Eileen Trevino  Date:10/4/2018  : 1970  [x]  Patient  Verified  Payor: Susannah Mtz / Plan: [facility] PPO / Product Type: PPO /    In time: 11:00 am  Out time: 12:00 pm  Total Treatment Time (min): 60  Total Timed Codes (min): 45  1:1 Treatment Time ( only): 45  Visit #:10    Treatment Area: Neck pain [M54.2]    SUBJECTIVE  Pain Level (0-10 scale): 2-3/10  Any medication changes, allergies to medications, adverse drug reactions, diagnosis change, or new procedure performed?: [x] No    [] Yes (see summary sheet for update)  Subjective functional status/changes:   [] No changes reported  Pt reports that she continues to have the pain up into the head. Will be getting injections tomorrow.     OBJECTIVE    Modality rationale: decrease pain and increase tissue extensibility to improve the patients ability to perform cervical AROM without pain   Min Type Additional Details    [] Estim: []Att   []Unatt        []TENS instruct                  []IFC  []Premod   []NMES                     []Other:  []w/US   []w/ice   []w/heat  Position: supine  Location: neck    []  Traction: [] Cervical       []Lumbar                       [] Prone          []Supine                       []Intermittent   []Continuous Lbs:  [] before manual  [] after manual  []w/heat    []  Ultrasound: []Continuous   [] Pulsed at:                           []1MHz   []3MHz Location:  W/cm2:    [] Paraffin         Location:   []w/heat   15 [x]  Ice     [x]  Heat  []  Ice massage Position: supine  Location: neck and R shoulder    []  Laser  []  Other: Position:  Location:      []  Vasopneumatic Device Pressure:       [] lo [] med [] hi   Temperature:      [x] Skin assessment post-treatment:  [x]intact [x]redness- no adverse reaction    []redness - adverse reaction:     45 min Therapeutic Exercise:  [x] See flow sheet :   Rationale: increase ROM, increase strength, increase proprioception and posture to improve the patients ability to sit without pain present          With   [x] TE   [] TA   [] neuro   [] other: Patient Education: [x] Review HEP    [] Progressed/Changed HEP based on:   [] positioning   [] body mechanics   [] transfers   [] heat/ice application    [] other:      Other Objective/Functional Measures: Mod vcs for correct stretching form. Pain Level (0-10 scale) post treatment: 0/10    ASSESSMENT/Changes in Function:     Patient will continue to benefit from skilled PT services to modify and progress therapeutic interventions, address functional mobility deficits, address ROM deficits, address strength deficits, analyze and address soft tissue restrictions, analyze and cue movement patterns, analyze and modify body mechanics/ergonomics, assess and modify postural abnormalities and instruct in home and community integration to attain remaining goals. []  See Plan of Care   []  See progress note/recertification  []  See Discharge Summary         Progress towards goals / Updated goals:  Patient educated on dry needling and asked to get a script from MD for possible use if injection doesn't work to decrease the need for possible surgery.      PLAN  [x]  Upgrade activities as tolerated     [x]  Continue plan of care  [x]  Update interventions per flow sheet       []  Discharge due to:_  []  Other:_        Surendra Quiroz PTA, CPT 10/4/2018  3:10 PM

## 2018-10-08 ENCOUNTER — HOSPITAL ENCOUNTER (OUTPATIENT)
Dept: PHYSICAL THERAPY | Age: 48
Discharge: HOME | End: 2018-10-08
Payer: COMMERCIAL

## 2018-10-08 PROCEDURE — 97140 MANUAL THERAPY 1/> REGIONS: CPT | Performed by: PHYSICAL MEDICINE & REHABILITATION

## 2018-10-08 PROCEDURE — 97112 NEUROMUSCULAR REEDUCATION: CPT | Performed by: PHYSICAL MEDICINE & REHABILITATION

## 2018-10-08 NOTE — PROGRESS NOTES
PT DAILY TREATMENT NOTE - [facility] 2-15    Patient Name: Salomón Hennessy  Date:10/8/2018  : 1970  [x]  Patient  Verified  Payor: BLUE CROSS / Plan: [facility] PPO / Product Type: PPO /    In time: 1200 pm  Out time: 105 pm  Total Treatment Time (min): 65  Total Timed Codes (min): 50  1:1 Treatment Time (MC only): 40  Visit #:11    Treatment Area: Neck pain [M54.2]    SUBJECTIVE  Pain Level (0-10 scale): 2-3/10  Any medication changes, allergies to medications, adverse drug reactions, diagnosis change, or new procedure performed?: [x] No    [] Yes (see summary sheet for update)  Subjective functional status/changes:   [] No changes reported  Pt reports that she is doing a little better with pain being less frequent and less intense.     OBJECTIVE    Modality rationale: decrease pain and increase tissue extensibility to improve the patients ability to perform cervical AROM without pain   Min Type Additional Details    [] Estim: []Att   []Unatt        []TENS instruct                  []IFC  []Premod   []NMES                     []Other:  []w/US   []w/ice   []w/heat  Position: supine  Location: neck    []  Traction: [] Cervical       []Lumbar                       [] Prone          []Supine                       []Intermittent   []Continuous Lbs:  [] before manual  [] after manual  []w/heat    []  Ultrasound: []Continuous   [] Pulsed at:                           []1MHz   []3MHz Location:  W/cm2:    [] Paraffin         Location:   []w/heat   15 []  Ice     [x]  Heat  []  Ice massage Position: supine  Location: neck and chest.    []  Laser  []  Other: Position:  Location:      []  Vasopneumatic Device Pressure:       [] lo [] med [] hi   Temperature:      [x] Skin assessment post-treatment:  [x]intact [x]redness- no adverse reaction    []redness - adverse reaction:     40 min Neuromuscular Re-education:  [x] See flow sheet :   Rationale: increase ROM, increase strength, increase proprioception and posture to improve the patients ability to sit without pain present    10 min Manual Therapy : Rib mobs with deep breathing to increase apical expansion. Rationale: increase ROM, increase strength, increase proprioception and posture to improve the patients ability to sit without pain present          With   [x] TE   [] TA   [] neuro   [] other: Patient Education: [x] Review HEP    [] Progressed/Changed HEP based on:   [] positioning   [] body mechanics   [] transfers   [] heat/ice application    [] other:      Other Objective/Functional Measures: Mod vcs for proper form and decrease belly breathing. Pain Level (0-10 scale) post treatment: 0/10    ASSESSMENT/Changes in Function:     Patient will continue to benefit from skilled PT services to modify and progress therapeutic interventions, address functional mobility deficits, address ROM deficits, address strength deficits, analyze and address soft tissue restrictions, analyze and cue movement patterns, analyze and modify body mechanics/ergonomics, assess and modify postural abnormalities and instruct in home and community integration to attain remaining goals. []  See Plan of Care   []  See progress note/recertification  []  See Discharge Summary         Progress towards goals / Updated goals:  Patient educated on dry needling and asked to get a script from MD for possible use if injection doesn't work to decrease the need for possible surgery.      PLAN  [x]  Upgrade activities as tolerated     [x]  Continue plan of care  [x]  Update interventions per flow sheet       []  Discharge due to:_  []  Other:_        Mena Lehman PTA, CPT 10/8/2018  3:10 PM

## 2018-10-11 ENCOUNTER — HOSPITAL ENCOUNTER (OUTPATIENT)
Dept: PHYSICAL THERAPY | Age: 48
Discharge: HOME | End: 2018-10-11
Payer: COMMERCIAL

## 2018-10-11 PROCEDURE — 97112 NEUROMUSCULAR REEDUCATION: CPT | Performed by: PHYSICAL MEDICINE & REHABILITATION

## 2018-10-11 NOTE — PROGRESS NOTES
PT DAILY TREATMENT NOTE - [facility] 2-15    Patient Name: Kelli Alert  Date:10/11/2018  : 1970  [x]  Patient  Verified  Payor: Andrew Patino / Plan: [facility] PPO / Product Type: PPO /    In time: 1100 am  Out time: 1200 pm  Total Treatment Time (min): 60  Total Timed Codes (min): 45  1:1 Treatment Time (MC only): 40  Visit #:12    Treatment Area: Neck pain [M54.2]    SUBJECTIVE  Pain Level (0-10 scale): 3/10  Any medication changes, allergies to medications, adverse drug reactions, diagnosis change, or new procedure performed?: [x] No    [] Yes (see summary sheet for update)  Subjective functional status/changes:   [] No changes reported  Pt reports that she is doing well with the new exercises.     OBJECTIVE    Modality rationale: decrease pain and increase tissue extensibility to improve the patients ability to perform cervical AROM without pain   Min Type Additional Details    [] Estim: []Att   []Unatt        []TENS instruct                  []IFC  []Premod   []NMES                     []Other:  []w/US   []w/ice   []w/heat  Position: supine  Location: neck    []  Traction: [] Cervical       []Lumbar                       [] Prone          []Supine                       []Intermittent   []Continuous Lbs:  [] before manual  [] after manual  []w/heat    []  Ultrasound: []Continuous   [] Pulsed at:                           []1MHz   []3MHz Location:  W/cm2:    [] Paraffin         Location:   []w/heat   15 []  Ice     [x]  Heat  []  Ice massage Position: supine  Location: neck and chest.    []  Laser  []  Other: Position:  Location:      []  Vasopneumatic Device Pressure:       [] lo [] med [] hi   Temperature:      [x] Skin assessment post-treatment:  [x]intact [x]redness- no adverse reaction    []redness - adverse reaction:     45 min Neuromuscular Re-education:  [x] See flow sheet :   Rationale: increase ROM, increase strength, increase proprioception and posture to improve the patients ability to sit without pain present          With   [x] TE   [] TA   [] neuro   [] other: Patient Education: [x] Review HEP    [] Progressed/Changed HEP based on:   [] positioning   [] body mechanics   [] transfers   [] heat/ice application    [] other:      Other Objective/Functional Measures: Mod vcs for proper form and decrease belly breathing. Pain Level (0-10 scale) post treatment: 1/10    ASSESSMENT/Changes in Function:     Patient will continue to benefit from skilled PT services to modify and progress therapeutic interventions, address functional mobility deficits, address ROM deficits, address strength deficits, analyze and address soft tissue restrictions, analyze and cue movement patterns, analyze and modify body mechanics/ergonomics, assess and modify postural abnormalities and instruct in home and community integration to attain remaining goals. []  See Plan of Care   []  See progress note/recertification  []  See Discharge Summary         Progress towards goals / Updated goals:  Patient educated on dry needling and asked to get a script from MD for possible use if injection doesn't work to decrease the need for possible surgery.      PLAN  [x]  Upgrade activities as tolerated     [x]  Continue plan of care  [x]  Update interventions per flow sheet       []  Discharge due to:_  []  Other:_        Zeinab Bartholomew PTA, CPT 10/11/2018  3:10 PM

## 2018-10-15 ENCOUNTER — HOSPITAL ENCOUNTER (OUTPATIENT)
Dept: PHYSICAL THERAPY | Age: 48
Discharge: HOME | End: 2018-10-15
Payer: COMMERCIAL

## 2018-10-15 PROCEDURE — 97112 NEUROMUSCULAR REEDUCATION: CPT | Performed by: PHYSICAL THERAPIST

## 2018-10-15 PROCEDURE — 97014 ELECTRIC STIMULATION THERAPY: CPT | Performed by: PHYSICAL THERAPIST

## 2018-10-15 NOTE — PROGRESS NOTES
PT DAILY TREATMENT NOTE - [facility] 2-15    Patient Name: Gali Woods  Date:10/15/2018  : 1970  [x]  Patient  Verified  Payor: Alvaro Sicard / Plan: [facility] PPO / Product Type: PPO /    In time: 12:00 pm  Out time: 1:00 pm  Total Treatment Time (min): 60  Total Timed Codes (min): 45  1:1 Treatment Time ( only): 30  Visit #:13    Treatment Area: Neck pain [M54.2]    SUBJECTIVE  Pain Level (0-10 scale): 3/10  Any medication changes, allergies to medications, adverse drug reactions, diagnosis change, or new procedure performed?: [x] No    [] Yes (see summary sheet for update)  Subjective functional status/changes:   [] No changes reported  Pt reports that she feels like the shots might have helped her neck a little bit. She reports that she is still having some swelling on the right side of her neck.     OBJECTIVE    Modality rationale: decrease pain and increase tissue extensibility to improve the patients ability to perform cervical AROM without pain   Min Type Additional Details    [] Estim: []Att   []Unatt        []TENS instruct                  []IFC  []Premod   []NMES                     []Other:  []w/US   []w/ice   []w/heat  Position: supine  Location: neck    []  Traction: [] Cervical       []Lumbar                       [] Prone          []Supine                       []Intermittent   []Continuous Lbs:  [] before manual  [] after manual  []w/heat    []  Ultrasound: []Continuous   [] Pulsed at:                           []1MHz   []3MHz Location:  W/cm2:    [] Paraffin         Location:   []w/heat   15 []  Ice     [x]  Heat  []  Ice massage Position: supine  Location: neck and chest.    []  Laser  []  Other: Position:  Location:      []  Vasopneumatic Device Pressure:       [] lo [] med [] hi   Temperature:      [x] Skin assessment post-treatment:  [x]intact [x]redness- no adverse reaction    []redness - adverse reaction:     45 min Neuromuscular Re-education:  [x] See flow sheet : Rationale: increase ROM, increase strength, increase proprioception and posture to improve the patients ability to sit without pain present          With   [x] TE   [] TA   [] neuro   [] other: Patient Education: [x] Review HEP    [] Progressed/Changed HEP based on:   [] positioning   [] body mechanics   [] transfers   [] heat/ice application    [] other:      Other Objective/Functional Measures:   Limited apical expansion present      Pain Level (0-10 scale) post treatment: 1/10    ASSESSMENT/Changes in Function:     Patient will continue to benefit from skilled PT services to modify and progress therapeutic interventions, address functional mobility deficits, address ROM deficits, address strength deficits, analyze and address soft tissue restrictions, analyze and cue movement patterns, analyze and modify body mechanics/ergonomics, assess and modify postural abnormalities and instruct in home and community integration to attain remaining goals. []  See Plan of Care   []  See progress note/recertification  []  See Discharge Summary         Progress towards goals / Updated goals:  Pt tolerated treatment well with benefit noted from breathing instruction. Pt requires mod to max v.c. For proper breathing technique.     PLAN  [x]  Upgrade activities as tolerated     [x]  Continue plan of care  [x]  Update interventions per flow sheet       []  Discharge due to:_  []  Other:_        Ellis Castillo, PT, PTA, CPT 10/15/2018  3:10 PM

## 2018-10-18 ENCOUNTER — HOSPITAL ENCOUNTER (OUTPATIENT)
Dept: PHYSICAL THERAPY | Age: 48
Discharge: HOME | End: 2018-10-18
Payer: COMMERCIAL

## 2018-10-18 PROCEDURE — 97112 NEUROMUSCULAR REEDUCATION: CPT | Performed by: PHYSICAL MEDICINE & REHABILITATION

## 2018-10-19 ENCOUNTER — HOSPITAL ENCOUNTER (OUTPATIENT)
Dept: PHYSICAL THERAPY | Age: 48
Discharge: HOME | End: 2018-10-19
Payer: COMMERCIAL

## 2018-10-19 PROCEDURE — 97112 NEUROMUSCULAR REEDUCATION: CPT | Performed by: PHYSICAL THERAPIST

## 2018-10-19 PROCEDURE — 97140 MANUAL THERAPY 1/> REGIONS: CPT | Performed by: PHYSICAL THERAPIST

## 2018-10-19 NOTE — PROGRESS NOTES
PT DAILY TREATMENT NOTE - [facility] 2-15    Patient Name: Ashleigh Walker  Date:10/19/2018  : 1970  [x]  Patient  Verified  Payor: BLUE CROSS / Plan: [facility] PPO / Product Type: PPO /    In time: 1100 AM  Out time: 11:55 AM  Total Treatment Time (min): 55  Total Timed Codes (min): 40  1:1 Treatment Time (MC only): 40  Visit #:15    Treatment Area: Neck pain [M54.2]    SUBJECTIVE  Pain Level (0-10 scale): 2/10  Any medication changes, allergies to medications, adverse drug reactions, diagnosis change, or new procedure performed?: [x] No    [] Yes (see summary sheet for update)  Subjective functional status/changes:   [] No changes reported  Patient reports similar symptoms compared to yesterday and is ready to initiate DN.     OBJECTIVE    Modality rationale: decrease pain and increase tissue extensibility to improve the patients ability to perform cervical AROM without pain   Type Additional Details   [] Estim: []Att   []Unatt        []TENS instruct                  []IFC  []Premod   []NMES                     []Other:  []w/US   []w/ice   []w/heat  Position: supine  Location: neck   []  Traction: [] Cervical       []Lumbar                       [] Prone          []Supine                       []Intermittent   []Continuous Lbs:  [] before manual  [] after manual  []w/heat   []  Ultrasound: []Continuous   [] Pulsed at:                           []1MHz   []3MHz Location:  W/cm2:   [] Paraffin         Location:   []w/heat   []  Ice     [x]  Heat  []  Ice massage Position: supine  Location: neck and chest.   []  Laser  []  Other: Position:  Location:   []  Vasopneumatic Device Pressure:       [] lo [] med [] hi   Temperature:      [x] Skin assessment post-treatment:  [x]intact [x]redness- no adverse reaction    []redness - adverse reaction:     15 min Neuromuscular Re-education:  [x] See flow sheet :   Rationale: increase ROM, increase strength, increase proprioception and posture to improve the patients ability to sit without pain present    25 min Manual Therapy:  DN was performed in prone to the muscle bellies of the following musculature:   right and left upper trapezius, two needles, 50 mm x .30 mm  Right and left splenius cervicis, two needles, 40 mm x .30 mm needles along the C5-C6 levels  Right and left splenius capitis, two needles, 30 mm x .20 mm at C3-C4  Rectus capitis major and minor  Obliquus inferior and superior    Watson Allen was performed at the upper trapezius to elicit LTR's and allow for optimal pain reduction. No winding was performed. DN was performed prior to the following manual therapy techniques to allow for improved ROM and greater pain relief:  Upper trapezius stretching in supine  Grade III P/A mobilizations along C3-T2   Rationale: increase ROM, decrease pain, decrease trigger points to improve the patients ability to sit without pain present          With   [x] TE   [] TA   [] neuro   [] other: Patient Education: [x] Review HEP    [] Progressed/Changed HEP based on:   [] positioning   [] body mechanics   [] transfers   [] heat/ice application    [] other:      Other Objective/Functional Measures:   CROM rotation following MT: 70 degrees B    Pain Level (0-10 scale) post treatment: 0/10    ASSESSMENT/Changes in Function:     Patient will continue to benefit from skilled PT services to modify and progress therapeutic interventions, address functional mobility deficits, address ROM deficits, address strength deficits, analyze and address soft tissue restrictions, analyze and cue movement patterns, analyze and modify body mechanics/ergonomics, assess and modify postural abnormalities and instruct in home and community integration to attain remaining goals.      []  See Plan of Care   []  See progress note/recertification  []  See Discharge Summary         Progress towards goals / Updated goals:  Patient tolerated today's therapy very well and will continue to utilize DN along with progressing PAULY program to allow for the achievement of all long term goals. PLAN  [x]  Upgrade activities as tolerated     [x]  Continue plan of care  [x]  Update interventions per flow sheet       []  Discharge due to:_  []  Other:_        Ritu Torres, PT  , DPT, OCS, Cert.  DN   10/19/2018  3:10 PM

## 2018-10-22 ENCOUNTER — HOSPITAL ENCOUNTER (OUTPATIENT)
Dept: PHYSICAL THERAPY | Age: 48
Discharge: HOME | End: 2018-10-22
Payer: COMMERCIAL

## 2018-10-22 PROCEDURE — 97014 ELECTRIC STIMULATION THERAPY: CPT | Performed by: PHYSICAL THERAPIST

## 2018-10-22 PROCEDURE — 97112 NEUROMUSCULAR REEDUCATION: CPT | Performed by: PHYSICAL THERAPIST

## 2018-10-22 NOTE — PROGRESS NOTES
PT DAILY TREATMENT NOTE - [facility] 2-15    Patient Name: Leelee Hinds  Date:10/22/2018  : 1970  [x]  Patient  Verified  Payor: Mary Cos / Plan: [facility] PPO / Product Type: PPO /    In time: 11:35 am  Out time: 12:30 pm  Total Treatment Time (min): 60  Total Timed Codes (min): 45  1:1 Treatment Time ( only): 45  Visit #:16    Treatment Area: Neck pain [M54.2]    SUBJECTIVE  Pain Level (0-10 scale): 2/10  Any medication changes, allergies to medications, adverse drug reactions, diagnosis change, or new procedure performed?: [x] No    [] Yes (see summary sheet for update)  Subjective functional status/changes:   [] No changes reported  Pt reports that he had the US of her neck and it was negative. She has had one dry needling appointment that was effective. She will have another one this week.     OBJECTIVE    Modality rationale: decrease pain and increase tissue extensibility to improve the patients ability to perform cervical AROM without pain   Min Type Additional Details    [] Estim: []Att   []Unatt        []TENS instruct                  []IFC  []Premod   []NMES                     []Other:  []w/US   []w/ice   []w/heat  Position: supine  Location: neck    []  Traction: [] Cervical       []Lumbar                       [] Prone          []Supine                       []Intermittent   []Continuous Lbs:  [] before manual  [] after manual  []w/heat    []  Ultrasound: []Continuous   [] Pulsed at:                           []1MHz   []3MHz Location:  W/cm2:    [] Paraffin         Location:   []w/heat   15 []  Ice     [x]  Heat  []  Ice massage Position: supine  Location: neck and chest.    []  Laser  []  Other: Position:  Location:      []  Vasopneumatic Device Pressure:       [] lo [] med [] hi   Temperature:      [x] Skin assessment post-treatment:  [x]intact [x]redness- no adverse reaction    []redness - adverse reaction:     45 min Neuromuscular Re-education:  [x] See flow sheet : Rationale: increase ROM, increase strength, increase proprioception and posture to improve the patients ability to sit without pain present          With   [x] TE   [] TA   [] neuro   [] other: Patient Education: [x] Review HEP    [] Progressed/Changed HEP based on:   [] positioning   [] body mechanics   [] transfers   [] heat/ice application    [] other:      Other Objective/Functional Measures:   Poor ZOA with limited ability/understanding of how to control this independently without v.c.  Limited apical expansion bilaterally      Pain Level (0-10 scale) post treatment: 2/10    ASSESSMENT/Changes in Function:     Patient will continue to benefit from skilled PT services to modify and progress therapeutic interventions, address functional mobility deficits, address ROM deficits, address strength deficits, analyze and address soft tissue restrictions, analyze and cue movement patterns, analyze and modify body mechanics/ergonomics, assess and modify postural abnormalities and instruct in home and community integration to attain remaining goals. []  See Plan of Care   []  See progress note/recertification  []  See Discharge Summary         Progress towards goals / Updated goals:  Pt tolerated treatment well with benefit noted from breathing instruction. Pt requires mod to max v.c. For proper breathing technique. Will try DN in 2 weeks.     PLAN  [x]  Upgrade activities as tolerated     [x]  Continue plan of care  [x]  Update interventions per flow sheet       []  Discharge due to:_  []  Other:_        Sallie Banuelos, PT, PTA, CPT 10/22/2018  3:10 PM

## 2018-10-23 ENCOUNTER — TELEPHONE (OUTPATIENT)
Dept: NEUROLOGY | Age: 48
End: 2018-10-23

## 2018-10-23 ENCOUNTER — HOSPITAL ENCOUNTER (OUTPATIENT)
Dept: PHYSICAL THERAPY | Age: 48
Discharge: HOME | End: 2018-10-23
Payer: COMMERCIAL

## 2018-10-23 PROCEDURE — 97014 ELECTRIC STIMULATION THERAPY: CPT | Performed by: PHYSICAL THERAPIST

## 2018-10-23 PROCEDURE — 97140 MANUAL THERAPY 1/> REGIONS: CPT | Performed by: PHYSICAL THERAPIST

## 2018-10-23 NOTE — TELEPHONE ENCOUNTER
----- Message from Dina Lipoma sent at 10/23/2018 10:32 AM EDT -----  Regarding: DANIEL Lezama/Telephone  Pt stated her neck is swollen on both sides, mostly on the(R) and also some discomfort for about 3 wks and getting worse, and requested a call back from the doctor. Pt stated she has an appt for 11/21/18, but would like a sooner appt.   Best contact number [phone],

## 2018-10-23 NOTE — PROGRESS NOTES
PT DAILY TREATMENT NOTE - [facility] 2-15    Patient Name: Kelsi Hartley  Date:10/23/2018  : 1970  [x]  Patient  Verified  Payor: BLUE CROSS / Plan: Yaima Vazquez [id number] PPO / Product Type: PPO /    In time: 2:30 PM  Out time: 3:25 PM  Total Treatment Time (min): 55  Total Timed Codes (min): 40  1:1 Treatment Time ( only): 30  Visit #:17    Treatment Area: Neck pain [M54.2]    SUBJECTIVE  Pain Level (0-10 scale): 1/10  Any medication changes, allergies to medications, adverse drug reactions, diagnosis change, or new procedure performed?: [x] No    [] Yes (see summary sheet for update)  Subjective functional status/changes:   [] No changes reported  Patient reports a slight improvement overall since her first session with dry needling.     OBJECTIVE    Modality rationale: decrease pain and increase tissue extensibility to improve the patients ability to perform cervical AROM without pain   Type Additional Details   [x] Estim: []Att   []Unatt        []TENS instruct                  []IFC  []Premod   []NMES                     [x]Other: asymmetrical, biphasic, 300 usec, 2 Hz  10 min []w/US   []w/ice   []w/heat  Position: supine  Location: R and L upper trapezius   []  Traction: [] Cervical       []Lumbar                       [] Prone          []Supine                       []Intermittent   []Continuous Lbs:  [] before manual  [] after manual  []w/heat   []  Ultrasound: []Continuous   [] Pulsed at:                           []1MHz   []3MHz Location:  W/cm2:   [] Paraffin         Location:   []w/heat   []  Ice     [x]  Heat  []  Ice massage Position: supine  Location: neck and chest.   []  Laser  []  Other: Position:  Location:   []  Vasopneumatic Device Pressure:       [] lo [] med [] hi   Temperature:      [x] Skin assessment post-treatment:  [x]intact [x]redness- no adverse reaction    []redness - adverse reaction:     15 min Neuromuscular Re-education:  [x] See flow sheet :   Rationale: increase ROM, increase strength, increase proprioception and posture to improve the patients ability to sit without pain present    25 min Manual Therapy:  DN was performed in prone to the muscle bellies of the following musculature:   right and left upper trapezius, two needles, 50 mm x .30 mm  Right and left splenius cervicis, two needles, 40 mm x .30 mm needles along the C5-C6 levels  Right and left splenius capitis, two needles, 30 mm x .20 mm at C3-C4  Rectus capitis major and minor  Obliquus inferior and superior    [place] was performed at the upper trapezius to elicit LTR's and allow for optimal pain reduction. No winding was performed. DN was performed prior to the following manual therapy techniques to allow for improved ROM and greater pain relief:  Upper trapezius stretching in supine  Grade III P/A mobilizations along C3-T2   Rationale: increase ROM, decrease pain, decrease trigger points to improve the patients ability to sit without pain present          With   [x] TE   [] TA   [] neuro   [] other: Patient Education: [x] Review HEP    [] Progressed/Changed HEP based on:   [] positioning   [] body mechanics   [] transfers   [] heat/ice application    [] other:      Other Objective/Functional Measures:   Multiple cavitations elicited along the left upper trapezius and R suboccipital region    Pain Level (0-10 scale) post treatment: 0/10    ASSESSMENT/Changes in Function:     Patient will continue to benefit from skilled PT services to modify and progress therapeutic interventions, address functional mobility deficits, address ROM deficits, address strength deficits, analyze and address soft tissue restrictions, analyze and cue movement patterns, analyze and modify body mechanics/ergonomics, assess and modify postural abnormalities and instruct in home and community integration to attain remaining goals.      []  See Plan of Care   []  See progress note/recertification  []  See Discharge Summary         Progress towards goals / Updated goals:  Patient tolerated today's therapy very well and is demonstrating improved cervical AROM while progressing well towards long term goals. PLAN  [x]  Upgrade activities as tolerated     [x]  Continue plan of care  [x]  Update interventions per flow sheet       []  Discharge due to:_  []  Other:_        Farooq Jimenez PT  , DPT, OCS, Cert.  DN   10/23/2018  3:10 PM

## 2018-10-24 ENCOUNTER — OFFICE VISIT (OUTPATIENT)
Dept: NEUROLOGY | Age: 48
End: 2018-10-24

## 2018-10-24 ENCOUNTER — HOSPITAL ENCOUNTER (OUTPATIENT)
Dept: GENERAL RADIOLOGY | Age: 48
Discharge: HOME | End: 2018-10-24
Attending: NURSE PRACTITIONER
Payer: COMMERCIAL

## 2018-10-24 VITALS
DIASTOLIC BLOOD PRESSURE: 76 MMHG | BODY MASS INDEX: 32.07 KG/M2 | HEIGHT: 63 IN | WEIGHT: 181 LBS | SYSTOLIC BLOOD PRESSURE: 108 MMHG

## 2018-10-24 DIAGNOSIS — R22.1 NECK SWELLING: ICD-10-CM

## 2018-10-24 DIAGNOSIS — R22.1 NECK SWELLING: Primary | ICD-10-CM

## 2018-10-24 PROCEDURE — 71047 X-RAY EXAM CHEST 3 VIEWS: CPT

## 2018-10-24 NOTE — PROGRESS NOTES
Patient is here for follow up. States she is here for fluid in her neck. Says it has been getting worse for the past 3 weeks.
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
## 2018-10-24 NOTE — PATIENT INSTRUCTIONS
[facility]   Statement to Patients  April 1, 2014      In an effort to ensure the large volume of patient prescription refills is processed in the most efficient and expeditious manner, we are asking our patients to assist us by calling your Pharmacy for all prescription refills, this will include also your  Mail Order Pharmacy. The pharmacy will contact our office electronically to continue the refill process. Please do not wait until the last minute to call your pharmacy. We need at least 48 hours (2days) to fill prescriptions. We also encourage you to call your pharmacy before going to  your prescription to make sure it is ready. With regard to controlled substance prescription refill requests (narcotic refills) that need to be picked up at our office, we ask your cooperation by providing us with at least 72 hours (3days) notice that you will need a refill. We will not refill narcotic prescription refill requests after 4:00pm on any weekday, Monday through Thursday, or after 2:00pm on Fridays, or on the weekends. We encourage everyone to explore another way of getting your prescription refill request processed using [employer], our patient web portal through our electronic medical record system. [employer] is an efficient and effective way to communicate your medication request directly to the office and  downloadable as an anne marie on your smart phone . [employer] also features a review functionality that allows you to view your medication list as well as leave messages for your physician. Are you ready to get connected? If so please review the attatched instructions or speak to any of our staff to get you set up right away! Thank you so much for your cooperation. Should you have any questions please contact our Practice Administrator.     The Physicians and Staff,  [facility]       Increase gabapentin at night time to 600 mg, and keep other doses the same

## 2018-10-25 ENCOUNTER — HOSPITAL ENCOUNTER (OUTPATIENT)
Dept: PHYSICAL THERAPY | Age: 48
Discharge: HOME | End: 2018-10-25
Payer: COMMERCIAL

## 2018-10-25 ENCOUNTER — TELEPHONE (OUTPATIENT)
Dept: NEUROLOGY | Age: 48
End: 2018-10-25

## 2018-10-25 PROCEDURE — 97112 NEUROMUSCULAR REEDUCATION: CPT | Performed by: PHYSICAL MEDICINE & REHABILITATION

## 2018-10-25 NOTE — TELEPHONE ENCOUNTER
----- Message from Jania Sellers NP sent at 10/25/2018  1:52 PM EDT -----  Can you let her know her X ray did not show anything. Not sure why the swelling is there. Can consider making an appointment with orthopedic surgery to evaluate further as not sure as to cause.       ----- Message -----  From: Ifeanyi Rad Results In  Sent: 10/24/2018  12:58 PM  To:  Jania Sellers NP

## 2018-10-25 NOTE — PROGRESS NOTES
PT DAILY TREATMENT NOTE - [facility] 2-15    Patient Name: Claudean Furlong  Date:10/25/2018  : 1970  [x]  Patient  Verified  Payor: Elissa Abreu / Plan: Yaima Vazquez [id number] PPO / Product Type: PPO /    In time: 11:00 am  Out time: 12:00 pm  Total Treatment Time (min): 60  Total Timed Codes (min): 45  1:1 Treatment Time ( only): 45  Visit #:18    Treatment Area: Neck pain [M54.2]    SUBJECTIVE  Pain Level (0-10 scale): 2/10  Any medication changes, allergies to medications, adverse drug reactions, diagnosis change, or new procedure performed?: [x] No    [] Yes (see summary sheet for update)  Subjective functional status/changes:   [] No changes reported  Pt reports that she feels the DN appts have been working. Advised patient to schedule out DN till next MD visit.      OBJECTIVE    Modality rationale: decrease pain and increase tissue extensibility to improve the patients ability to perform cervical AROM without pain   Type Additional Details   [] Estim: []Att   []Unatt        []TENS instruct                  []IFC  []Premod   []NMES                     []Other:  []w/US   []w/ice   []w/heat  Position: supine  Location: neck   []  Traction: [] Cervical       []Lumbar                       [] Prone          []Supine                       []Intermittent   []Continuous Lbs:  [] before manual  [] after manual  []w/heat   []  Ultrasound: []Continuous   [] Pulsed at:                           []1MHz   []3MHz Location:  W/cm2:   [] Paraffin         Location:   []w/heat   []  Ice     [x]  Heat  []  Ice massage Position: supine  Location: neck and chest.   []  Laser  []  Other: Position:  Location:   []  Vasopneumatic Device Pressure:       [] lo [] med [] hi   Temperature:      [x] Skin assessment post-treatment:  [x]intact [x]redness- no adverse reaction    []redness - adverse reaction:     45 min Neuromuscular Re-education:  [x] See flow sheet :   Rationale: increase ROM, increase strength, increase proprioception and posture to improve the patients ability to sit without pain present          With   [x] TE   [] TA   [] neuro   [] other: Patient Education: [x] Review HEP    [] Progressed/Changed HEP based on:   [] positioning   [] body mechanics   [] transfers   [] heat/ice application    [] other:      Other Objective/Functional Measures:   Poor ZOA with limited ability/understanding of how to control this independently without v.c.  Limited apical expansion bilaterally      Pain Level (0-10 scale) post treatment: 2/10    ASSESSMENT/Changes in Function:     Patient will continue to benefit from skilled PT services to modify and progress therapeutic interventions, address functional mobility deficits, address ROM deficits, address strength deficits, analyze and address soft tissue restrictions, analyze and cue movement patterns, analyze and modify body mechanics/ergonomics, assess and modify postural abnormalities and instruct in home and community integration to attain remaining goals. []  See Plan of Care   []  See progress note/recertification  []  See Discharge Summary         Progress towards goals / Updated goals:  Pt tolerated treatment well with benefit noted from breathing instruction. Pt requires mod to max v.c. For proper breathing technique. Will try DN in for 3 weeks then return to MD on 11/21.     PLAN  [x]  Upgrade activities as tolerated     [x]  Continue plan of care  [x]  Update interventions per flow sheet       []  Discharge due to:_  []  Other:_        Hilton Eubanks PTA, CPT 10/25/2018  3:10 PM

## 2018-10-25 NOTE — TELEPHONE ENCOUNTER
Patient called back. She spoke with an orthopedic Dr. Taylor Carrillo today and an MRI was ordered. They did not see anything wrong with the tissues .

## 2018-10-29 ENCOUNTER — HOSPITAL ENCOUNTER (OUTPATIENT)
Dept: PHYSICAL THERAPY | Age: 48
Discharge: HOME | End: 2018-10-29
Payer: COMMERCIAL

## 2018-10-29 PROCEDURE — 97014 ELECTRIC STIMULATION THERAPY: CPT | Performed by: PHYSICAL THERAPIST

## 2018-10-29 PROCEDURE — 97112 NEUROMUSCULAR REEDUCATION: CPT | Performed by: PHYSICAL THERAPIST
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
## 2018-10-30 ENCOUNTER — HOSPITAL ENCOUNTER (OUTPATIENT)
Dept: PHYSICAL THERAPY | Age: 48
Discharge: HOME | End: 2018-10-30
Payer: COMMERCIAL

## 2018-10-30 PROCEDURE — 97140 MANUAL THERAPY 1/> REGIONS: CPT | Performed by: PHYSICAL THERAPIST

## 2018-10-30 PROCEDURE — 97112 NEUROMUSCULAR REEDUCATION: CPT | Performed by: PHYSICAL THERAPIST

## 2018-10-30 PROCEDURE — 97014 ELECTRIC STIMULATION THERAPY: CPT | Performed by: PHYSICAL THERAPIST

## 2018-10-30 NOTE — PROGRESS NOTES
PT DAILY TREATMENT NOTE - [facility] 2-15    Patient Name: Ashleigh Walker  Date:10/30/2018  : 1970  [x]  Patient  Verified  Payor: Lacey Turner / Plan: Yaima Vazquez [id number] PPO / Product Type: PPO /    In time: 5:25 PM  Out time: 6:15 PM  Total Treatment Time (min): 50  Total Timed Codes (min): 40  1:1 Treatment Time ( only): 40  Visit #:18    Treatment Area: Neck pain [M54.2]    SUBJECTIVE  Pain Level (0-10 scale): 1/10  Any medication changes, allergies to medications, adverse drug reactions, diagnosis change, or new procedure performed?: [x] No    [] Yes (see summary sheet for update)  Subjective functional status/changes:   [] No changes reported  Patient reports continued improvement with both dry needling and the [facility] program, but is having increased neck pain over the last several days for no apparent reason.     OBJECTIVE    Modality rationale: decrease pain and increase tissue extensibility to improve the patients ability to perform cervical AROM without pain   Type Additional Details   [x] Estim: []Att   []Unatt        []TENS instruct                  []IFC  []Premod   []NMES                     [x]Other: asymmetrical, biphasic, 300 usec, 2 Hz  10 min []w/US   []w/ice   []w/heat  Position: supine  Location: R and L upper trapezius   []  Traction: [] Cervical       []Lumbar                       [] Prone          []Supine                       []Intermittent   []Continuous Lbs:  [] before manual  [] after manual  []w/heat   []  Ultrasound: []Continuous   [] Pulsed at:                           []1MHz   []3MHz Location:  W/cm2:   [] Paraffin         Location:   []w/heat   []  Ice     [x]  Heat  []  Ice massage Position: supine  Location: neck and chest.   []  Laser  []  Other: Position:  Location:   []  Vasopneumatic Device Pressure:       [] lo [] med [] hi   Temperature:      [x] Skin assessment post-treatment:  [x]intact [x]redness- no adverse reaction    []redness - adverse reaction:     15 min Neuromuscular Re-education:  [x] See flow sheet :   Rationale: increase ROM, increase strength, increase proprioception and posture to improve the patients ability to sit without pain present    25 min Manual Therapy:  DN was performed in prone to the muscle bellies of the following musculature:   right and left upper trapezius, two needles, 50 mm x .30 mm  Right and left splenius cervicis, two needles, 40 mm x .30 mm needles along the C5-C6 levels  Right and left splenius capitis, two needles, 40 mm x .30 mm at C3-C4  Rectus capitis major and minor  Obliquus inferior and superior    Armand Bucker was performed at the upper trapezius to elicit LTR's and allow for optimal pain reduction. No winding was performed. DN was performed prior to the following manual therapy techniques to allow for improved ROM and greater pain relief:  Upper trapezius stretching in supine  Grade III P/A mobilizations along C3-T2   Rationale: increase ROM, decrease pain, decrease trigger points to improve the patients ability to sit without pain present          With   [x] TE   [] TA   [] neuro   [] other: Patient Education: [x] Review HEP    [] Progressed/Changed HEP based on:   [] positioning   [] body mechanics   [] transfers   [] heat/ice application    [] other:      Other Objective/Functional Measures:   Multiple LTR's elicited along the left upper trapezius and R suboccipital region    Pain Level (0-10 scale) post treatment: 0/10    ASSESSMENT/Changes in Function:     Patient will continue to benefit from skilled PT services to modify and progress therapeutic interventions, address functional mobility deficits, address ROM deficits, address strength deficits, analyze and address soft tissue restrictions, analyze and cue movement patterns, analyze and modify body mechanics/ergonomics, assess and modify postural abnormalities and instruct in home and community integration to attain remaining goals.      []  See Plan of Care   []  See progress note/recertification  []  See Discharge Summary         Progress towards goals / Updated goals:  Patient is showing slow progress overall towards long term goals and eventual discharge, but is tolerating all interventions well. PLAN  [x]  Upgrade activities as tolerated     [x]  Continue plan of care  [x]  Update interventions per flow sheet       []  Discharge due to:_  []  Other:_        Bolivar Gist, PT  , DPT, OCS, Cert.  DN   10/30/2018  3:10 PM

## 2018-11-05 ENCOUNTER — APPOINTMENT (OUTPATIENT)
Dept: PHYSICAL THERAPY | Age: 48
End: 2018-11-05

## 2018-11-07 DIAGNOSIS — R51.9 INTRACTABLE HEADACHE, UNSPECIFIED CHRONICITY PATTERN, UNSPECIFIED HEADACHE TYPE: Primary | ICD-10-CM

## 2018-11-08 ENCOUNTER — APPOINTMENT (OUTPATIENT)
Dept: PHYSICAL THERAPY | Age: 48
End: 2018-11-08

## 2018-11-19 ENCOUNTER — HOSPITAL ENCOUNTER (OUTPATIENT)
Dept: MRI IMAGING | Age: 48
Discharge: HOME | End: 2018-11-19
Attending: NURSE PRACTITIONER
Payer: COMMERCIAL

## 2018-11-19 DIAGNOSIS — R51.9 INTRACTABLE HEADACHE, UNSPECIFIED CHRONICITY PATTERN, UNSPECIFIED HEADACHE TYPE: ICD-10-CM

## 2018-11-19 PROCEDURE — 70551 MRI BRAIN STEM W/O DYE: CPT

## 2018-11-21 ENCOUNTER — OFFICE VISIT (OUTPATIENT)
Dept: NEUROLOGY | Age: 48
End: 2018-11-21

## 2018-11-21 VITALS — HEIGHT: 63 IN | WEIGHT: 181 LBS | BODY MASS INDEX: 32.07 KG/M2

## 2018-11-21 DIAGNOSIS — R20.0 NUMBNESS AND TINGLING OF BOTH LEGS: Primary | ICD-10-CM

## 2018-11-21 DIAGNOSIS — R20.2 NUMBNESS AND TINGLING OF BOTH LEGS: Primary | ICD-10-CM

## 2018-11-21 NOTE — PATIENT INSTRUCTIONS
10 [facility]   Statement to Patients  April 1, 2014      In an effort to ensure the large volume of patient prescription refills is processed in the most efficient and expeditious manner, we are asking our patients to assist us by calling your Pharmacy for all prescription refills, this will include also your  Mail Order Pharmacy. The pharmacy will contact our office electronically to continue the refill process. Please do not wait until the last minute to call your pharmacy. We need at least 48 hours (2days) to fill prescriptions. We also encourage you to call your pharmacy before going to  your prescription to make sure it is ready. With regard to controlled substance prescription refill requests (narcotic refills) that need to be picked up at our office, we ask your cooperation by providing us with at least 72 hours (3days) notice that you will need a refill. We will not refill narcotic prescription refill requests after 4:00pm on any weekday, Monday through Thursday, or after 2:00pm on Fridays, or on the weekends. We encourage everyone to explore another way of getting your prescription refill request processed using [employer], our patient web portal through our electronic medical record system. [employer] is an efficient and effective way to communicate your medication request directly to the office and  downloadable as an anne marie on your smart phone . [employer] also features a review functionality that allows you to view your medication list as well as leave messages for your physician. Are you ready to get connected? If so please review the attatched instructions or speak to any of our staff to get you set up right away! Thank you so much for your cooperation. Should you have any questions please contact our Practice Administrator.     The Physicians and Staff,  [facility]
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
## 2018-11-28 ENCOUNTER — TELEPHONE (OUTPATIENT)
Dept: NEUROLOGY | Age: 48
End: 2018-11-28

## 2018-11-28 ENCOUNTER — OFFICE VISIT (OUTPATIENT)
Dept: NEUROLOGY | Age: 48
End: 2018-11-28

## 2018-11-28 DIAGNOSIS — M54.17 LUMBOSACRAL RADICULOPATHY: Primary | ICD-10-CM

## 2018-11-28 NOTE — TELEPHONE ENCOUNTER
----- Message from Adi Krueger sent at 11/28/2018  2:32 PM EST -----  Regarding: DANIEL Lezama/Telephone  Luana([employer]) stated a fax was sent on 11/21/18 and 11/26/18  regarding pt's additional time off from work,and would like to know if it was received. Best contact number [phone].
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
## 2018-11-28 NOTE — PROGRESS NOTES
EMG/ NCS Report  [address] [facility] [address] [facility] [address] [place], [address]   Ph: [phone]   FAX: [phone]  Test Date:  2018    Patient: José Luis Song : 1970 Physician: Khai Araujo MD   Sex: Female Height: ' \" Ref PhysTherdavide Scirich   ID#: [id number] Weight:  lbs. Technician: Manju Haney     Patient History / Exam:  CC:NUMBNESS,TINGLING DANELLE. LOWER. EMG & NCV Findings:  Evaluation of the left Fibular motor and the right Fibular motor nerves showed normal distal onset latency (L3.3, R3.4 ms), normal amplitude (L7.8, R5.7 mV), normal conduction velocity (B Fib-Ankle, L52, R47 m/s), and normal conduction velocity (Poplt-B Fib, L71, R77 m/s). The left tibial motor and the right tibial motor nerves showed normal distal onset latency (L4.5, R5.0 ms), normal amplitude (L11.9, R16.9 mV), and normal conduction velocity (Knee-Ankle, L62, R57 m/s). The left Sup Fibular sensory and the right Sup Fibular sensory nerves showed normal distal peak latency (L2.3, R2.2 ms), normal amplitude (L39.3, R20.5 µV), and normal conduction velocity (Lower leg-Lat ankle, L56, R56 m/s). The left sural sensory and the right sural sensory nerves showed normal distal peak latency (L3.3, R3.2 ms) and normal amplitude (L27.8, R17.9 µV). All F Wave latencies were within normal limits. All F Wave left vs. right side latency differences were within normal limits. All H Reflex left vs. right side latency differences were within normal limits. All examined muscles (as indicated in the following table) showed no evidence of electrical instability.         Impression:        ___________________________  Rishi Cook IV, MD      Nerve Conduction Studies  Anti Sensory Summary Table     Stim Site NR Peak (ms) Norm Peak (ms) P-T Amp (µV) Norm P-T Amp Site1 Site2 Dist (cm)   Left Sup Fibular Anti Sensory (Lat ankle)  30.5°C   Lower leg 2. 3 <4.5 39.3 >5 Lower leg Lat ankle 10.0   Right Sup Fibular Anti Sensory (Lat ankle)  31.6°C   Lower leg    2.2 <4.5 20.5 >5 Lower leg Lat ankle 10.0   Site 2    2.3  20.2       Left Sural Anti Sensory (Lat Mall)  31.9°C   Calf    3.3 <4.5 27.8 >4.0 Calf Lat Mall 14.0   Site 2    3.4  23.9       Right Sural Anti Sensory (Lat Mall)  32.7°C   Calf    3.2 <4.5 17.9 >4.0 Calf Lat Mall 14.0   Site 3    3.2  18.4         Motor Summary Table     Stim Site NR Onset (ms) Norm Onset (ms) O-P Amp (mV) Norm O-P Amp Amp (Prev) (%) Site1 Site2 Dist (cm) Diogo (m/s) Norm Diogo (m/s)   Left Fibular Motor (Ext Dig Brev)  29.7°C   Ankle    3.3 <6.5 7.8 >2.6 100.0 Ankle Ext Dig Brev 8.0     B Fib    9.1  6.7  85.9 B Fib Ankle 30.0 52 >38   Poplt    10.5  6.0  89.6 Poplt B Fib 10.0 71 >42   Right Fibular Motor (Ext Dig Brev)  31°C   Ankle    3.4 <6.5 5.7 >2.6 100.0 Ankle Ext Dig Brev 8.0     B Fib    9.8  5.0  87.7 B Fib Ankle 30.0 47 >38   Poplt    11.1  4.9  98.0 Poplt B Fib 10.0 77 >42   Left Tibial Motor (Abd Strickland Brev)  29.4°C   Ankle    4.5 <6.1 11.9 >5.3 100.0 Ankle Abd Strickland Brev 8.0     Knee    10.5  6.5  54.6 Knee Ankle 37.0 62 >39   Right Tibial Motor (Abd Strickland Brev)  30.5°C   Ankle    5.0 <6.1 16.9 >5.3 100.0 Ankle Abd Strickland Brev 8.0     Knee    11.5  10.8  63.9 Knee Ankle 37.0 57 >39     F Wave Studies     NR F-Lat (ms) Lat Norm (ms) L-R F-Lat (ms) L-R Lat Norm   Left Tibial (Mrkrs) (Abd Hallucis)  29.2°C      47.21 <56 0.00 <5.7   Right Tibial (Mrkrs) (Abd Hallucis)  30.4°C      47.21 <56 0.00 <5.7     H Reflex Studies     NR H-Lat (ms) L-R H-Lat (ms) L-R Lat Norm   Left Tibial (Gastroc)  29.1°C      33.20 0.00 <2.0   Right Tibial (Gastroc)  30.3°C      33.20 0.00 <2.0     EMG     Side Muscle Nerve Root Ins Act Fibs Psw Recrt Duration Amp Poly Comment   Right Ext Dig Brev Dp Br Peron L5, S1 Nml Nml Nml Nml Nml Nml Nml    Right AntTibialis Dp Br Peron L4-5 Nml Nml Nml Nml Nml Nml Nml    Right MedGastroc Tibial S1-2 Nml Nml Nml Nml Nml Nml Nml    Right VastusMed Femoral L2-4 Nml Nml Nml Nml Nml Nml Nml    Right BicepsFemL Sciatic L5-S2 Nml Nml Nml Nml Nml Nml Nml    Left Ext Dig Brev Dp Br Peron L5, S1 Nml Nml Nml Nml Nml Nml Nml    Left AntTibialis Dp Br Peron L4-5 Nml Nml Nml Nml Nml Nml Nml    Left MedGastroc Tibial S1-2 Nml Nml Nml Nml Nml Nml Nml    Left VastusMed Femoral L2-4 Nml Nml Nml Nml Nml Nml Nml    Left BicepsFemL Sciatic L5-S2 Nml Nml Nml Nml Nml Nml Nml    Left Mid Lumb Parasp Rami L4,5 Nml Nml Nml Nml Nml Nml Nml    Left Lower Lumb Parasp Rami L5,S1 Nml Nml Nml Nml Nml Nml Nml    Right Mid Lumb Parasp Rami L4,5 Nml Nml Nml Nml Nml Nml Nml    Right Lower Lumb Parasp Rami L5,S1 Nml Nml Nml Nml Nml Nml Nml                Nerve Conduction Studies  Anti Sensory Left/Right Comparison     Stim Site L Lat (ms) R Lat (ms) L-R Lat (ms) L Amp (µV) R Amp (µV) L-R Amp (%) Site1 Site2 L Diogo (m/s) R Diogo (m/s) L-R Diogo (m/s)   Sup Fibular Anti Sensory (Lat ankle)  30.5°C   Lower leg 1.8 1.8 0.0 39.3 20.5 47.8 Lower leg Lat ankle 56 56 0   Sural Anti Sensory (Lat Mall)  31.9°C   Calf 2.8 2.6 0.2 27.8 17.9 35.6 Calf Lat Mall 50 54 4   Site 2 2.7   23.9            Motor Left/Right Comparison     Stim Site L Lat (ms) R Lat (ms) L-R Lat (ms) L Amp (mV) R Amp (mV) L-R Amp (%) Site1 Site2 L Diogo (m/s) R Diogo (m/s) L-R Diogo (m/s)   Fibular Motor (Ext Dig Brev)  29.7°C   Ankle 3.3 3.4 0.1 7.8 5.7 26.9 Ankle Ext Dig Brev      B Fib 9.1 9.8 0.7 6.7 5.0 25.4 B Fib Ankle 52 47 5   Poplt 10.5 11.1 0.6 6.0 4.9 18.3 Poplt B Fib 71 77 6   Tibial Motor (Abd Strickland Brev)  29.4°C   Ankle 4.5 5.0 0.5 11.9 16.9 29.6 Ankle Abd Strickland Brev      Knee 10.5 11.5 1.0 6.5 10.8 39.8 Knee Ankle 62 57 5         Waveforms:

## 2018-11-29 ENCOUNTER — TELEPHONE (OUTPATIENT)
Dept: NEUROLOGY | Age: 48
End: 2018-11-29

## 2018-11-29 NOTE — TELEPHONE ENCOUNTER
----- Message from Ridge Camarillo sent at 11/29/2018  9:48 AM EST -----  Regarding: /Telephone  Pt is requesting a call back with the status of her [employer]/ short term disability  Papers. Best contact number is [phone].

## 2018-12-06 RX ORDER — GABAPENTIN 400 MG/1
400 CAPSULE ORAL 3 TIMES DAILY
Qty: 90 CAP | Refills: 5 | Status: SHIPPED | OUTPATIENT
Start: 2018-12-06 | End: 2019-01-07 | Stop reason: SDUPTHER

## 2018-12-28 ENCOUNTER — OFFICE VISIT (OUTPATIENT)
Dept: NEUROLOGY | Age: 48
End: 2018-12-28

## 2018-12-28 VITALS
WEIGHT: 181 LBS | SYSTOLIC BLOOD PRESSURE: 142 MMHG | DIASTOLIC BLOOD PRESSURE: 78 MMHG | BODY MASS INDEX: 32.07 KG/M2 | HEIGHT: 63 IN

## 2018-12-28 DIAGNOSIS — G43.919 INTRACTABLE MIGRAINE WITHOUT STATUS MIGRAINOSUS, UNSPECIFIED MIGRAINE TYPE: Primary | ICD-10-CM

## 2018-12-28 DIAGNOSIS — M54.2 NECK PAIN: ICD-10-CM

## 2018-12-28 RX ORDER — MELOXICAM 15 MG/1
1 TABLET ORAL AS NEEDED
Refills: 1 | COMMUNITY
Start: 2018-09-27 | End: 2019-05-08

## 2018-12-28 NOTE — PATIENT INSTRUCTIONS
[facility]   Statement to Patients  April 1, 2014      In an effort to ensure the large volume of patient prescription refills is processed in the most efficient and expeditious manner, we are asking our patients to assist us by calling your Pharmacy for all prescription refills, this will include also your  Mail Order Pharmacy. The pharmacy will contact our office electronically to continue the refill process. Please do not wait until the last minute to call your pharmacy. We need at least 48 hours (2days) to fill prescriptions. We also encourage you to call your pharmacy before going to  your prescription to make sure it is ready. With regard to controlled substance prescription refill requests (narcotic refills) that need to be picked up at our office, we ask your cooperation by providing us with at least 72 hours (3days) notice that you will need a refill. We will not refill narcotic prescription refill requests after 4:00pm on any weekday, Monday through Thursday, or after 2:00pm on Fridays, or on the weekends. We encourage everyone to explore another way of getting your prescription refill request processed using [employer], our patient web portal through our electronic medical record system. [employer] is an efficient and effective way to communicate your medication request directly to the office and  downloadable as an anne marie on your smart phone . [employer] also features a review functionality that allows you to view your medication list as well as leave messages for your physician. Are you ready to get connected? If so please review the attatched instructions or speak to any of our staff to get you set up right away! Thank you so much for your cooperation. Should you have any questions please contact our Practice Administrator.     The Physicians and Staff,  [facility]

## 2018-12-28 NOTE — PROGRESS NOTES
Head still hurts. Saw dr. Dennis Mahoney last week and got another injection in her neck. That did help a little. She still has swelling around her neck and burning/numbness in right leg. When moving clothes over in the closet she feels pain in her right arm. She has soreness and pain in her right shoulder when she lays her head down and then comes back up.

## 2018-12-31 ENCOUNTER — TELEPHONE (OUTPATIENT)
Dept: NEUROLOGY | Age: 48
End: 2018-12-31
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
## 2018-12-31 NOTE — TELEPHONE ENCOUNTER
----- Message from Toshia Liriano sent at 12/31/2018 11:02 AM EST -----  Regarding: DANIEL Toledo from [facility] group [phone], extn [phone], following up on a disability claim form that was sent on 12/28/18, they are checking on the  Status.

## 2019-01-04 NOTE — TELEPHONE ENCOUNTER
Called [employer]. Spoke with mike. He is going to refax the forms now. Fax number verified for [phone](nurses station).

## 2019-01-07 ENCOUNTER — TELEPHONE (OUTPATIENT)
Dept: NEUROLOGY | Age: 49
End: 2019-01-07

## 2019-01-07 RX ORDER — GABAPENTIN 400 MG/1
CAPSULE ORAL
Qty: 120 CAP | Refills: 5 | Status: SHIPPED | OUTPATIENT
Start: 2019-01-07 | End: 2019-02-05 | Stop reason: SDUPTHER

## 2019-01-07 NOTE — TELEPHONE ENCOUNTER
Called patient. Informed her script was sent and paperwork to [employer] was faxed.  She states understanding

## 2019-01-07 NOTE — TELEPHONE ENCOUNTER
R/t call to patient. Informed her that paperwork was received Friday and NP has paperwork now. She states understanding. She takes 400mg. She takes 1 cap in morning and at lunch and the takes 2 cap at night.  She needs a new prescription that states that because she cannot get a refill now because it lists her taking it 1 cap TID

## 2019-01-07 NOTE — TELEPHONE ENCOUNTER
Attempted to contact patient.  Left message on voicemail stating paperwork will be faxed back today 1/7/19

## 2019-01-07 NOTE — TELEPHONE ENCOUNTER
----- Message from [facility] sent at 1/4/2019  3:52 PM EST -----  Regarding: DANIEL Grady/Telephone  Keshawn Hunt, from [facility] [phone] ext [phone], is checking on the status of a disability form being faxed. Keshawn Hunt faxed form on 12/28.

## 2019-01-08 ENCOUNTER — TELEPHONE (OUTPATIENT)
Dept: NEUROLOGY | Age: 49
End: 2019-01-08

## 2019-01-08 NOTE — TELEPHONE ENCOUNTER
----- Message from Robe Krause sent at 1/8/2019  3:40 PM EST -----  Regarding: DANIEL Lezama/Telephone  Siomara Duran with [employer] is calling regarding pt's disability paperwork. [phone] ext [phone]. She needs to know if they support pt to be off of work to her next appt Jan 28.

## 2019-01-28 ENCOUNTER — OFFICE VISIT (OUTPATIENT)
Dept: NEUROLOGY | Age: 49
End: 2019-01-28

## 2019-01-28 VITALS
SYSTOLIC BLOOD PRESSURE: 110 MMHG | WEIGHT: 187.5 LBS | DIASTOLIC BLOOD PRESSURE: 70 MMHG | RESPIRATION RATE: 18 BRPM | OXYGEN SATURATION: 96 % | BODY MASS INDEX: 33.22 KG/M2 | HEIGHT: 63 IN | HEART RATE: 88 BPM

## 2019-01-28 DIAGNOSIS — M54.2 NECK PAIN: ICD-10-CM

## 2019-01-28 DIAGNOSIS — G43.919 INTRACTABLE MIGRAINE WITHOUT STATUS MIGRAINOSUS, UNSPECIFIED MIGRAINE TYPE: Primary | ICD-10-CM

## 2019-01-28 RX ORDER — DIAZEPAM 5 MG/1
TABLET ORAL
COMMUNITY
End: 2019-04-22

## 2019-01-28 RX ORDER — KETOROLAC TROMETHAMINE 10 MG/1
TABLET, FILM COATED ORAL
COMMUNITY
End: 2019-05-03
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
## 2019-01-29 ENCOUNTER — TELEPHONE (OUTPATIENT)
Dept: NEUROLOGY | Age: 49
End: 2019-01-29

## 2019-01-29 NOTE — TELEPHONE ENCOUNTER
----- Message from [facility] sent at 1/29/2019 11:29 AM EST -----  Regarding: DANIEL Grady/Telephone  Contact: [phone]  Pt wanted to let Geannie Alpers know that she is calling about her disability paperwork [employer]. The group sent paperwork for long term and short term, and short term is the only form that has to be filled up. Paperwork was faxed 1/28 and has to be completed in 7days.
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
## 2019-02-04 ENCOUNTER — TELEPHONE (OUTPATIENT)
Dept: NEUROLOGY | Age: 49
End: 2019-02-04

## 2019-02-04 NOTE — TELEPHONE ENCOUNTER
----- Message from Amanda Infante sent at 2/4/2019 10:48 AM EST -----  Regarding: NP OScar/Refill  Pt is requesting a call back from NP Roberto due to medication increase. Would like to up dosage for medication \"Gabapentin\". Send to [facility] [employer] [facility] [phone]. Best contact [phone].

## 2019-02-05 RX ORDER — GABAPENTIN 800 MG/1
800 TABLET ORAL 3 TIMES DAILY
Qty: 90 TAB | Refills: 5 | Status: SHIPPED | OUTPATIENT
Start: 2019-02-05 | End: 2019-05-03

## 2019-02-05 RX ORDER — GABAPENTIN 400 MG/1
CAPSULE ORAL
Qty: 120 CAP | Refills: 5 | Status: SHIPPED | OUTPATIENT
Start: 2019-02-05 | End: 2019-02-05

## 2019-02-05 NOTE — TELEPHONE ENCOUNTER
Patient currently takes gabapentin 400mg 1 in AM, 1 at lunch and 2 QHS. She wants to increase from that.      Prescription sent was the same prescription

## 2019-02-08 ENCOUNTER — TELEPHONE (OUTPATIENT)
Dept: NEUROLOGY | Age: 49
End: 2019-02-08

## 2019-02-08 NOTE — TELEPHONE ENCOUNTER
----- Message from [facility] sent at 2/8/2019  9:15 AM EST -----  Regarding: DANIEL Grady/Telephone  Contact: [phone]  Pt wanted to let the NP know that her pharmacy [facility] [phone] have a question about her Gabapentin and want to know the dosage 400mg or 800mg? Or if pt is suppose to be on the 800?

## 2019-02-08 NOTE — TELEPHONE ENCOUNTER
----- Message from Iam Brock sent at 2/8/2019  8:20 AM EST -----  Regarding: DANIEL Lezmaa/Celina Adams with [facility], is calling regarding two different strengths were sent for  Gabapentin and they wanted to know which the office wanted to have filled. [phone].

## 2019-02-19 ENCOUNTER — OFFICE VISIT (OUTPATIENT)
Dept: NEUROLOGY | Age: 49
End: 2019-02-19

## 2019-02-19 VITALS — BODY MASS INDEX: 33.13 KG/M2 | WEIGHT: 187 LBS | HEIGHT: 63 IN

## 2019-02-19 DIAGNOSIS — M54.2 NECK PAIN: Primary | ICD-10-CM

## 2019-02-19 NOTE — PROGRESS NOTES
4/10 head pain right now. Did also have shooting pain in right hip this past Saturday. Head pain every day normally around 5/10. Swollen area on right side of her neck. Went to Dr. Jennifer Baron 2.13.19 and had another injection done. He might suggest a referral to surgeon if swelling continues.

## 2019-02-19 NOTE — LETTER
2/19/2019 11:31 AM 
 
RE:    Abdulkadir Last  
[address] [facility] [address] [address] [facility] [phone] I am referring my patient to you for evaluation of Neck Pain. Please see her 
pertinent patient information below. Medical History:    
Past Medical History:  
Diagnosis Date  Arthritis   
 neck  Depression  Lobular carcinoma in situ of breast 11/1/2012 She has failed conservative treatments. I appreciate your assistance in Ms. Cuco Taveras care  and look forward to your findings and recommendations. Sincerely, Sommer Aguiar NP
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
## 2019-02-19 NOTE — LETTER
NOTIFICATION OF RETURN TO WORK / SCHOOL 
2/19/2019 Ms. Abdulkadir Last 
[address] [place] [id number] To Whom It May Concern: 
 
Abdulkadir Last was under the care of [facility]. She will return to work on March 1, 2019 with no restrictions. If there are questions or concerns please have the patient contact our office. Sincerely, Isaias Mejia

## 2019-03-18 DIAGNOSIS — G43.919 INTRACTABLE MIGRAINE WITHOUT STATUS MIGRAINOSUS, UNSPECIFIED MIGRAINE TYPE: ICD-10-CM

## 2019-03-18 DIAGNOSIS — M54.2 NECK PAIN: Primary | ICD-10-CM

## 2019-04-03 ENCOUNTER — HOSPITAL ENCOUNTER (OUTPATIENT)
Dept: PHYSICAL THERAPY | Age: 49
Discharge: HOME | End: 2019-04-03
Payer: COMMERCIAL

## 2019-04-03 PROCEDURE — 97112 NEUROMUSCULAR REEDUCATION: CPT | Performed by: PHYSICAL THERAPIST

## 2019-04-03 PROCEDURE — 97161 PT EVAL LOW COMPLEX 20 MIN: CPT | Performed by: PHYSICAL THERAPIST

## 2019-04-03 PROCEDURE — 97110 THERAPEUTIC EXERCISES: CPT | Performed by: PHYSICAL THERAPIST
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
## 2019-04-09 ENCOUNTER — HOSPITAL ENCOUNTER (OUTPATIENT)
Dept: PHYSICAL THERAPY | Age: 49
Discharge: HOME | End: 2019-04-09
Payer: COMMERCIAL

## 2019-04-09 PROCEDURE — 97112 NEUROMUSCULAR REEDUCATION: CPT | Performed by: PHYSICAL MEDICINE & REHABILITATION

## 2019-04-09 PROCEDURE — 97110 THERAPEUTIC EXERCISES: CPT | Performed by: PHYSICAL MEDICINE & REHABILITATION

## 2019-04-09 NOTE — PROGRESS NOTES
PT DAILY TREATMENT NOTE - [facility] 2-15    Patient Name: Krishna Saldaña  Date:2019  : 1970  [x]  Patient  Verified  Payor: Guzman Harris / Plan: Yaima Vazquez [id number] PPO / Product Type: PPO /    In time:935 am  Out time:1030 am  Total Treatment Time (min): 55  Total Timed Codes (min): 40  1:1 Treatment Time ( only): 35   Visit #: 2      Treatment Area: Cervicalgia [M54.2]    SUBJECTIVE  Pain Level (0-10 scale): 5-6/10  Any medication changes, allergies to medications, adverse drug reactions, diagnosis change, or new procedure performed?: [x] No    [] Yes (see summary sheet for update)  Subjective functional status/changes:   [] No changes reported  Patient reported continued head pressure and pain present. Patient stated she hasn't had a HA since last visit but did have the pressure and pain in the head. Patient stated she feels an increase head pain and pressue when she steps up onto her step stool to grab something from the top shelf. Patient thinks \"Its from being up higher and the air pressure being different. \"    OBJECTIVE    eModality rationale: decrease inflammation, decrease pain and increase tissue extensibility to improve the patients ability to ADLs, work and standing.    Min Type Additional Details    [] Estim: []Att   []Unatt        []TENS instruct                  []IFC  []Premod   []NMES                     []Other:  []w/US   []w/ice   []w/heat  Position:  Location:    []  Traction: [] Cervical       []Lumbar                       [] Prone          []Supine                       []Intermittent   []Continuous Lbs:  [] before manual  [] after manual  []w/heat    []  Ultrasound: []Continuous   [] Pulsed at:                           []1MHz   []3MHz Location:  W/cm2:    [] Paraffin         Location:   []w/heat   15 [x]  Ice     [x]  Heat  []  Ice massage Position: supine  Location: head and neck    []  Laser  []  Other: Position:  Location:      []  Vasopneumatic Device Pressure:       [] lo [] med [] hi   Temperature:      [x] Skin assessment post-treatment:  [x]intact []redness- no adverse reaction    []redness - adverse reaction:     30 min Therapeutic Exercise:  [x] See flow sheet :   Rationale: increase ROM, increase strength and improve coordination to improve the patients ability to ADLs, lift and carry, work tolerance     10 min Neuromuscular Re-education:  [x]  See flow sheet :   Rationale: improve coordination, improve balance and increase proprioception  to improve the patients ability to ADLs, walking and standing tolerance          With   [x] TE   [] TA   [] neuro   [] other: Patient Education: [x] Review HEP    [] Progressed/Changed HEP based on:   [] positioning   [] body mechanics   [] transfers   [] heat/ice application    [] other:      Other Objective/Functional Measures:   Patient with slight head pressure with band exercises (2/10)     Pain Level (0-10 scale) post treatment: 1/10    ASSESSMENT/Changes in Function:     Patient will continue to benefit from skilled PT services to modify and progress therapeutic interventions, address functional mobility deficits, address ROM deficits, address strength deficits, analyze and address soft tissue restrictions, analyze and cue movement patterns, analyze and modify body mechanics/ergonomics, assess and modify postural abnormalities and address imbalance/dizziness to attain remaining goals. []  See Plan of Care  []  See progress note/recertification  []  See Discharge Summary         Progress towards goals / Updated goals:  Patient demonstrated improvement in balance but poor ability to perform VOR. Educated patient on not pushing into symptoms but to rest if symptoms increase >5/10. If symptoms don't reduce in 2-3 minutes then stop activity and rest for 15-20 minutes till symptoms reduce.     PLAN  [x]  Upgrade activities as tolerated     [x]  Continue plan of care  [x]  Update interventions per flow sheet       []  Discharge due to:_  [] Other:_      Cherylene Buckler, PTA, CPT 4/9/2019

## 2019-04-11 ENCOUNTER — HOSPITAL ENCOUNTER (OUTPATIENT)
Dept: PHYSICAL THERAPY | Age: 49
Discharge: HOME | End: 2019-04-11
Payer: COMMERCIAL

## 2019-04-11 PROCEDURE — 97110 THERAPEUTIC EXERCISES: CPT | Performed by: PHYSICAL MEDICINE & REHABILITATION

## 2019-04-11 PROCEDURE — 97112 NEUROMUSCULAR REEDUCATION: CPT | Performed by: PHYSICAL MEDICINE & REHABILITATION

## 2019-04-11 NOTE — PROGRESS NOTES
PT DAILY TREATMENT NOTE - [facility] 2-15    Patient Name: Morena Kumar  Date:2019  : 1970  [x]  Patient  Verified  Payor: Rendon Rhonda / Plan: Yaima Vazquez [id number] PPO / Product Type: PPO /    In time:800 am  Out time:905 am  Total Treatment Time (min): 65  Total Timed Codes (min): 50  1:1 Treatment Time ( only): 35   Visit #: 3      Treatment Area: Cervicalgia [M54.2]    SUBJECTIVE  Pain Level (0-10 scale): 4-5/10  Any medication changes, allergies to medications, adverse drug reactions, diagnosis change, or new procedure performed?: [x] No    [] Yes (see summary sheet for update)  Subjective functional status/changes:   [] No changes reported  Patient reported she felt good after last visit till she got up and started walking. \"That's when the head pressure starting to increase. \"    OBJECTIVE    eModality rationale: decrease inflammation, decrease pain and increase tissue extensibility to improve the patients ability to ADLs, work and standing.    Min Type Additional Details    [] Estim: []Att   []Unatt        []TENS instruct                  []IFC  []Premod   []NMES                     []Other:  []w/US   []w/ice   []w/heat  Position:  Location:    []  Traction: [] Cervical       []Lumbar                       [] Prone          []Supine                       []Intermittent   []Continuous Lbs:  [] before manual  [] after manual  []w/heat    []  Ultrasound: []Continuous   [] Pulsed at:                           []1MHz   []3MHz Location:  W/cm2:    [] Paraffin         Location:   []w/heat   15 [x]  Ice     [x]  Heat  []  Ice massage Position: supine  Location: head and neck    []  Laser  []  Other: Position:  Location:      []  Vasopneumatic Device Pressure:       [] lo [] med [] hi   Temperature:      [x] Skin assessment post-treatment:  [x]intact []redness- no adverse reaction    []redness - adverse reaction:     35 min Therapeutic Exercise:  [x] See flow sheet :   Rationale: increase ROM, increase strength and improve coordination to improve the patients ability to ADLs, lift and carry, work tolerance     15 min Neuromuscular Re-education:  [x]  See flow sheet :   Rationale: improve coordination, improve balance and increase proprioception  to improve the patients ability to ADLs, walking and standing tolerance          With   [x] TE   [] TA   [] neuro   [] other: Patient Education: [x] Review HEP    [] Progressed/Changed HEP based on:   [] positioning   [] body mechanics   [] transfers   [] heat/ice application    [] other:      Other Objective/Functional Measures:   Slight neck tightness with VOR exercises. Poor coordination with VORx2 (not given for HEP). Pain Level (0-10 scale) post treatment: 2/10    ASSESSMENT/Changes in Function:     Patient will continue to benefit from skilled PT services to modify and progress therapeutic interventions, address functional mobility deficits, address ROM deficits, address strength deficits, analyze and address soft tissue restrictions, analyze and cue movement patterns, analyze and modify body mechanics/ergonomics, assess and modify postural abnormalities and address imbalance/dizziness to attain remaining goals. []  See Plan of Care  []  See progress note/recertification  []  See Discharge Summary         Progress towards goals / Updated goals:  Patient demonstrated improvement in balance but poor ability to perform VOR. Educated patient on not pushing into symptoms but to rest if symptoms increase >5/10. If symptoms don't reduce in 2-3 minutes then stop activity and rest for 15-20 minutes till symptoms reduce.     PLAN  [x]  Upgrade activities as tolerated     [x]  Continue plan of care  [x]  Update interventions per flow sheet       []  Discharge due to:_  []  Other:_      Natasha Ratliff PTA, CPT 4/11/2019

## 2019-04-16 ENCOUNTER — HOSPITAL ENCOUNTER (OUTPATIENT)
Dept: PHYSICAL THERAPY | Age: 49
Discharge: HOME | End: 2019-04-16
Payer: COMMERCIAL

## 2019-04-16 PROCEDURE — 97014 ELECTRIC STIMULATION THERAPY: CPT | Performed by: PHYSICAL THERAPIST

## 2019-04-16 PROCEDURE — 97140 MANUAL THERAPY 1/> REGIONS: CPT | Performed by: PHYSICAL THERAPIST

## 2019-04-16 PROCEDURE — 97110 THERAPEUTIC EXERCISES: CPT | Performed by: PHYSICAL THERAPIST

## 2019-04-16 PROCEDURE — 97112 NEUROMUSCULAR REEDUCATION: CPT | Performed by: PHYSICAL THERAPIST

## 2019-04-16 NOTE — PROGRESS NOTES
PT DAILY TREATMENT NOTE - [facility] 2-15    Patient Name: Alyssa Khan  Date:2019  : 1970  [x]  Patient  Verified  Payor: BLUE CROSS / Plan: [facility] PPO / Product Type: PPO /    In time:10:05 AM  Out time:11:15 AM  Total Treatment Time (min): 70 minutes  Total Timed Codes (min): 60 minutes  1:1 Treatment Time (MC only): 45 minutes   Visit #:  4    Treatment Area: Cervicalgia [M54.2]    SUBJECTIVE  Pain Level (0-10 scale): 5-6/10  Any medication changes, allergies to medications, adverse drug reactions, diagnosis change, or new procedure performed?: [x] No    [] Yes (see summary sheet for update)  Subjective functional status/changes:   [] No changes reported  Pt. reports increased neck pain and stiffness today but is unable to think of any changes that could have cause increased symptoms. OBJECTIVE    Modality rationale: decrease pain and increase tissue extensibility to improve the patients ability to perform reaching tasks, work tasks, and driving.    Min Type Additional Details      10 [x] Estim: []Att   [x]Unatt    []TENS instruct                  []IFC  [x]Premod   []NMES                     []Other:  []w/US   [x]w/ice   [x]w/heat  Position: supine  Location: UT       []  Traction: [] Cervical       []Lumbar                       [] Prone          []Supine                       []Intermittent   []Continuous Lbs:  [] before manual  [] after manual  []w/heat    []  Ultrasound: []Continuous   [] Pulsed                       at: []1MHz   []3MHz Location:  W/cm2:    [] Paraffin         Location:   []w/heat    []  Ice     []  Heat  []  Ice massage Position:  Location:    []  Laser  []  Other: Position:  Location:      []  Vasopneumatic Device Pressure:       [] lo [] med [] hi   Temperature:      [x] Skin assessment post-treatment:  [x]intact []redness- no adverse reaction    []redness - adverse reaction:     35 min Therapeutic Exercise:  [x] See flow sheet :   Rationale: increase ROM and increase strength to improve the patients ability to perform reaching tasks, work tasks, and driving. 15 min Neuromuscular Re-education:  [x]  See flow sheet :   Rationale: increase strength, improve coordination, improve balance and increase proprioception  to improve the patients ability to balance, ambulate, perform work tasks, and driving. 10 min Manual Therapy: MFR to B UT, B levator, R first rib mobs, UT stretch    Rationale: decrease pain, increase ROM, increase tissue extensibility and decrease trigger points to improve the patients ability to perform reaching tasks, work tasks, and driving. With   [x] TE   [] TA   [x] neuro   [] other: Patient Education: [x] Review HEP    [] Progressed/Changed HEP based on:   [x] positioning   [x] body mechanics   [] transfers   [] heat/ice application    [] other:      Other Objective/Functional Measures:   No LOB during SLS on pillow x 30 sec B   Pt able to perform VOR x2 without difficulty, added to HEP  Pain Level (0-10 scale) post treatment: 2/10    ASSESSMENT/Changes in Function:     Patient will continue to benefit from skilled PT services to modify and progress therapeutic interventions, address functional mobility deficits, address ROM deficits, address strength deficits, analyze and address soft tissue restrictions, analyze and cue movement patterns, analyze and modify body mechanics/ergonomics, assess and modify postural abnormalities and address imbalance/dizziness to attain remaining goals. []  See Plan of Care  []  See progress note/recertification  []  See Discharge Summary         Progress towards goals / Updated goals:  Pt. demonstrated same level of difficulty in all exercises and added a vestibular exercise that was previously too difficult despite feeling worse today.     PLAN  [x]  Upgrade activities as tolerated     [x]  Continue plan of care  [x]  Update interventions per flow sheet       []  Discharge due to:_  []  Other:_ Jan Vera, PT 4/16/2019

## 2019-04-18 ENCOUNTER — HOSPITAL ENCOUNTER (OUTPATIENT)
Dept: PHYSICAL THERAPY | Age: 49
Discharge: HOME | End: 2019-04-18
Payer: COMMERCIAL

## 2019-04-18 PROCEDURE — 97112 NEUROMUSCULAR REEDUCATION: CPT | Performed by: PHYSICAL MEDICINE & REHABILITATION

## 2019-04-18 PROCEDURE — 97014 ELECTRIC STIMULATION THERAPY: CPT | Performed by: PHYSICAL MEDICINE & REHABILITATION

## 2019-04-18 PROCEDURE — 97110 THERAPEUTIC EXERCISES: CPT | Performed by: PHYSICAL MEDICINE & REHABILITATION

## 2019-04-18 NOTE — PROGRESS NOTES
PT DAILY TREATMENT NOTE - [facility] 2-15    Patient Name: Armando Vaca  Date:2019  : 1970  [x]  Patient  Verified  Payor: BLUE CROSS / Plan: Yaima Vazquez [id number] PPO / Product Type: PPO /    In time:100 PM  Out time:200 PM  Total Treatment Time (min): 60   Total Timed Codes (min): 45  1:1 Treatment Time ( only): 45   Visit #:  5    Treatment Area: Cervicalgia [M54.2]    SUBJECTIVE  Pain Level (0-10 scale): 4/10  Any medication changes, allergies to medications, adverse drug reactions, diagnosis change, or new procedure performed?: [x] No    [] Yes (see summary sheet for update)  Subjective functional status/changes:   [] No changes reported  Pt. reports having continued neck stiffness and pressure throughout the day. OBJECTIVE    Modality rationale: decrease pain and increase tissue extensibility to improve the patients ability to perform reaching tasks, work tasks, and driving.    Min Type Additional Details      15 [x] Estim: []Att   [x]Unatt    []TENS instruct                  [x]IFC  []Premod   []NMES                     []Other:  []w/US   [x]w/ice   [x]w/heat  Position: supine  Location: neck       []  Traction: [] Cervical       []Lumbar                       [] Prone          []Supine                       []Intermittent   []Continuous Lbs:  [] before manual  [] after manual  []w/heat    []  Ultrasound: []Continuous   [] Pulsed                       at: []1MHz   []3MHz Location:  W/cm2:    [] Paraffin         Location:   []w/heat    []  Ice     []  Heat  []  Ice massage Position:  Location:    []  Laser  []  Other: Position:  Location:      []  Vasopneumatic Device Pressure:       [] lo [] med [] hi   Temperature:      [x] Skin assessment post-treatment:  [x]intact []redness- no adverse reaction    []redness - adverse reaction:     30 min Therapeutic Exercise:  [x] See flow sheet :   Rationale: increase ROM and increase strength to improve the patients ability to perform reaching tasks, work tasks, and driving. 15 min Neuromuscular Re-education:  [x]  See flow sheet :   Rationale: increase strength, improve coordination, improve balance and increase proprioception  to improve the patients ability to balance, ambulate, perform work tasks, and driving. With   [x] TE   [] TA   [x] neuro   [] other: Patient Education: [x] Review HEP    [] Progressed/Changed HEP based on:   [x] positioning   [x] body mechanics   [] transfers   [] heat/ice application    [] other:      Other Objective/Functional Measures:   No LOB during SLS on pillow x 30 sec B Will progress next visit  Increase in head pressure and neck tightness with both VORs. Patient c/o of nausea following doorway/corner stretch today. Pain Level (0-10 scale) post treatment: 2/10    ASSESSMENT/Changes in Function:     Patient will continue to benefit from skilled PT services to modify and progress therapeutic interventions, address functional mobility deficits, address ROM deficits, address strength deficits, analyze and address soft tissue restrictions, analyze and cue movement patterns, analyze and modify body mechanics/ergonomics, assess and modify postural abnormalities and address imbalance/dizziness to attain remaining goals. []  See Plan of Care  []  See progress note/recertification  []  See Discharge Summary         Progress towards goals / Updated goals:  Patient demonstrates mixed symptoms each visit with good overall tolerance to exercise.      PLAN  [x]  Upgrade activities as tolerated     [x]  Continue plan of care  [x]  Update interventions per flow sheet       []  Discharge due to:_  []  Other:_      Mayelin Posada PTA, CPT 4/18/2019

## 2019-04-22 ENCOUNTER — OFFICE VISIT (OUTPATIENT)
Dept: NEUROLOGY | Age: 49
End: 2019-04-22

## 2019-04-22 VITALS
BODY MASS INDEX: 33.13 KG/M2 | WEIGHT: 187 LBS | DIASTOLIC BLOOD PRESSURE: 78 MMHG | SYSTOLIC BLOOD PRESSURE: 128 MMHG | HEIGHT: 63 IN

## 2019-04-22 DIAGNOSIS — G43.809 CERVICOGENIC MIGRAINE: Primary | ICD-10-CM

## 2019-04-22 RX ORDER — BACLOFEN 10 MG/1
1 TABLET ORAL 3 TIMES DAILY
COMMUNITY
Start: 2019-04-15 | End: 2020-06-09

## 2019-04-22 NOTE — PATIENT INSTRUCTIONS
Switch from Gabapentin 800 mg three times daily       Start Gralise  1800 mg  Nightly in place of Gabapentin

## 2019-04-22 NOTE — PROGRESS NOTES
Used aimovig for 2 months, requested refill but thought it would be mailed to her. She did not check pharmacy.      Scheduled to have surgery with dr. Hayden Post 5/7/19
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
## 2019-04-23 ENCOUNTER — APPOINTMENT (OUTPATIENT)
Dept: PHYSICAL THERAPY | Age: 49
End: 2019-04-23
Payer: COMMERCIAL

## 2019-04-25 ENCOUNTER — HOSPITAL ENCOUNTER (OUTPATIENT)
Dept: PHYSICAL THERAPY | Age: 49
Discharge: HOME | End: 2019-04-25
Payer: COMMERCIAL

## 2019-04-25 PROCEDURE — 97110 THERAPEUTIC EXERCISES: CPT | Performed by: PHYSICAL MEDICINE & REHABILITATION

## 2019-04-25 PROCEDURE — 97112 NEUROMUSCULAR REEDUCATION: CPT | Performed by: PHYSICAL MEDICINE & REHABILITATION

## 2019-04-25 NOTE — ANCILLARY DISCHARGE INSTRUCTIONS
[id number] [place] [address] Gabrielle [address] 56  Phone: [phone]  Fax: [phone]    Discharge Summary  2-15    Patient name: Armando Vaca  : 1970  Provider#: [id number]  Referral source: Lisa Dolan NP      Medical/Treatment Diagnosis: Cervicalgia [M54.2]     Prior Hospitalization: see medical history     Comorbidities: See Plan of Care  Prior Level of Function:See Plan of Care  Medications: Verified on Patient Summary List    ASSESSMENT/SUMMARY OF CARE:   Patient has met all STG and no LTG. Patient will be having neck surgery in 2 weeks and will focus on HEP till then. Despite showing some progress in cervical AROM and balance tolerance, patient continues to be limited by HA and neck pain with radiculopathy on a daily basis.     Short Term Goals: To be accomplished in 4 weeks:  1) The patient will be independent with introductory HEP - MET  2) The patient will report ability to bend forward without an increase in pain, to improve ease with dressing - MET  3) The patient will demonstrate ability to hold R SLS 30 seconds to indicate improved static stability - MET  Long Term Goals: To be accomplished in 12 weeks:  1) The patient will report ability to complete household chores without an increase in symptoms - Not Met  2) The patient will report ability to complete work tasks without an increase in symptoms - Not Met  3) The patient will demonstrate negative sharpened Romberg to indicate decreased risk of falls - Not Met       RECOMMENDATIONS:  [x]Discontinue therapy:   []Patient has reached or is progressing toward set goals                                                  []Patient is non-compliant or has abdicated                                                  []Due to lack of appreciable progress towards set goals                                                  [x]Other - Surgery in 2 weeks.         Deonna Madrid, PT 2019

## 2019-04-25 NOTE — PROGRESS NOTES
PT DAILY TREATMENT NOTE - [facility] 2-15    Patient Name: Taylor Almanza  Date:2019  : 1970  [x]  Patient  Verified  Payor: [employer]UE [employer]IL / Plan: Yaima Vazquez [id number] PPO / Product Type: PPO /    In time:1100 AM  Out time:1130 AM  Total Treatment Time (min): 30  Total Timed Codes (min): 30  1:1 Treatment Time ( only): 30   Visit #:  6    Treatment Area: Cervicalgia [M54.2]    SUBJECTIVE  Pain Level (0-10 scale): 5/10  Any medication changes, allergies to medications, adverse drug reactions, diagnosis change, or new procedure performed?: [x] No    [] Yes (see summary sheet for update)  Subjective functional status/changes:   [] No changes reported  Pt. reports she will having neck surgery in 2 weeks. Patient states she continues to have increase in HA and neck pain with head pressure. OBJECTIVE    15 min Therapeutic Exercise:  [x] See flow sheet :   Rationale: increase ROM and increase strength to improve the patients ability to perform reaching tasks, work tasks, and driving. 15 min Neuromuscular Re-education:  [x]  See flow sheet :   Rationale: increase strength, improve coordination, improve balance and increase proprioception  to improve the patients ability to balance, ambulate, perform work tasks, and driving. With   [x] TE   [] TA   [x] neuro   [] other: Patient Education: [x] Review HEP    [] Progressed/Changed HEP based on:   [x] positioning   [x] body mechanics   [] transfers   [] heat/ice application    [] other:      Other Objective/Functional Measures:   Reviewed HEP    Pain Level (0-10 scale) post treatment: 5/10    ASSESSMENT/Changes in Function:      []  See Plan of Care  []  See progress note/recertification  [x]  See Discharge Summary         Progress towards goals / Updated goals:  Patient has met all STG and no LTG. Patient will be having neck surgery in 2 weeks and will focus on HEP till then.  Despite showing some progress in cervical AROM and balance tolerance, patient continues to be limited by HA and neck pain with radiculopathy on a daily basis. Short Term Goals: To be accomplished in 4 weeks:  1) The patient will be independent with introductory HEP - MET  2) The patient will report ability to bend forward without an increase in pain, to improve ease with dressing - MET  3) The patient will demonstrate ability to hold R SLS 30 seconds to indicate improved static stability - MET  Long Term Goals: To be accomplished in 12 weeks:  1) The patient will report ability to complete household chores without an increase in symptoms - Not Met  2) The patient will report ability to complete work tasks without an increase in symptoms - Not Met  3) The patient will demonstrate negative sharpened Romberg to indicate decreased risk of falls - Not Met        PLAN  []  Upgrade activities as tolerated     []  Continue plan of care  []  Update interventions per flow sheet       [x]  Discharge due to: Surgery in 2 weeks.   []  Other:_      Matthew Hernández PTA, CPT 4/25/2019

## 2019-05-01 NOTE — MR AVS SNAPSHOT
[address] [place] [address] [address] [address] [phone] Patient: Skyler Mejia MRN: [id number] BPT:6/11/2704 Visit Information Date & Time Provider Department Dept. Phone Encounter #  
 9/19/2018 10:30 AM Lacho Alexander NP [facility] Neurology [facility] [phone] [id number] Follow-up Instructions Return in about 2 months (around 11/19/2018). Upcoming Health Maintenance Date Due DTaP/Tdap/Td series (1 - Tdap) 9/23/1990 PAP AKA CERVICAL CYTOLOGY 9/23/1990 Influenza Age 5 to Adult 8/1/2018 Allergies as of 9/19/2018  Review Complete On: 9/19/2018 By: Yanira Meza No Known Allergies Current Immunizations  Never Reviewed No immunizations on file. Not reviewed this visit You Were Diagnosed With   
  
 Codes Comments Neck pain    -  Primary ICD-10-CM: M54.2 ICD-9-CM: 723.1 Intractable headache, unspecified chronicity pattern, unspecified headache type     ICD-10-CM: R51 ICD-9-CM: 745. 0 Vitals BP Height(growth percentile) Weight(growth percentile) BMI OB Status Smoking Status 138/84 5' 3\" (1.6 m) 181 lb (82.1 kg) 32.06 kg/m2 Having regular periods Never Smoker BMI and BSA Data Body Mass Index Body Surface Area 32.06 kg/m 2 1.91 m 2 Preferred Pharmacy Pharmacy Name Phone [address] [facility], [place] [address] [address] ([address] [phone] Your Updated Medication List  
  
   
This list is accurate as of 9/19/18 11:00 AM.  Always use your most recent med list.  
  
  
  
  
 gabapentin 300 mg capsule Commonly known as:  NEURONTIN Take 1 Cap by mouth three (3) times daily. methocarbamol 750 mg tablet Commonly known as:  ROBAXIN Take  by mouth four (4) times daily. OTHER Take 2,000 Units by mouth every seven (7) days. Vitamin D every friday TYLENOL EXTRA STRENGTH 500 mg tablet Generic drug:  acetaminophen Take 2 Tabs by mouth as needed. Prescriptions Sent to Pharmacy Refills  
 gabapentin (NEURONTIN) 300 mg capsule 5 Sig: Take 1 Cap by mouth three (3) times daily. Class: Normal  
 [facility]: [facility] [address] #: [phone] Route: Oral  
  
We Performed the Following REFERRAL TO PAIN MANAGEMENT [WLD377 Custom] Comments:  
 Evaluate for cervical MARYAM Follow-up Instructions Return in about 2 months (around 11/19/2018). To-Do List   
 09/20/2018 11:00 AM  
  Appointment with Jeffrey Champion at [facility] Terri ([phone]) Please remember to arrive at the hospital at least 30 minutes prior to your scheduled appointment time. When you come in for your appointment, please be sure to bring the therapy prescription the doctor gave you, your insurance card, and a list of the medicines you are taking. Also, please remember to wear comfortable, loose- fitting clothes. We look forward to seeing you. 09/24/2018 10:30 AM  
  Appointment with Jeffrey Champion at [facility] ([phone]) Please remember to arrive at the hospital at least 30 minutes prior to your scheduled appointment time. When you come in for your appointment, please be sure to bring the therapy prescription the doctor gave you, your insurance card, and a list of the medicines you are taking. Also, please remember to wear comfortable, loose- fitting clothes. We look forward to seeing you. 09/27/2018 10:30 AM  
  Appointment with Jeffrey Champion at [facility] ([phone]) Please remember to arrive at the hospital at least 30 minutes prior to your scheduled appointment time.   When you come in for your appointment, please be sure to bring the therapy prescription the doctor gave you, your insurance card, and a list of the medicines you are taking. Also, please remember to wear comfortable, loose- fitting clothes. We look forward to seeing you. Referral Information Referral ID Referred By Referred To  
  
 [id number]   
   [address] Phone: [phone] Fax: [phone] Visits Status Start Date End Date 1 New Request 9/19/18 9/19/19 If your referral has a status of pending review or denied, additional information will be sent to support the outcome of this decision. Patient Instructions PRESCRIPTION REFILL POLICY [facility] Statement to Patients April 1, 2014 In an effort to ensure the large volume of patient prescription refills is processed in the most efficient and expeditious manner, we are asking our patients to assist us by calling your Pharmacy for all prescription refills, this will include also your  Mail Order Pharmacy. The pharmacy will contact our office electronically to continue the refill process. Please do not wait until the last minute to call your pharmacy. We need at least 48 hours (2days) to fill prescriptions. We also encourage you to call your pharmacy before going to  your prescription to make sure it is ready. With regard to controlled substance prescription refill requests (narcotic refills) that need to be picked up at our office, we ask your cooperation by providing us with at least 72 hours (3days) notice that you will need a refill. We will not refill narcotic prescription refill requests after 4:00pm on any weekday, Monday through Thursday, or after 2:00pm on Fridays, or on the weekends.   
  
We encourage everyone to explore another way of getting your prescription refill request processed using [employer]), our patient web portal through our electronic medical record system. Pingboard is an efficient and effective way to communicate your medication request directly to the office and  downloadable as an anne marie on your smart phone . Pingboard also features a review functionality that allows you to view your medication list as well as leave messages for your physician. Are you ready to get connected? If so please review the attatched instructions or speak to any of our staff to get you set up right away! Thank you so much for your cooperation. Should you have any questions please contact our Practice Administrator. The Physicians and Staff,  [facility] Introducing [facility]! John Sanchez introduces Pingboard patient portal. Now you can access parts of your medical record, email your doctor's office, and request medication refills online. 1. In your internet browser, go to [email or web address] 2. Click on the First Time User? Click Here link in the Sign In box. You will see the New Member Sign Up page. 3. Enter your Pingboard Access Code exactly as it appears below. You will not need to use this code after youve completed the sign-up process. If you do not sign up before the expiration date, you must request a new code. · Pingboard Access Code: [email or web address] Expires: 11/14/2018 11:43 AM 
 
4. Enter the last four digits of your Social Security Number (xxxx) and Date of Birth (mm/dd/yyyy) as indicated and click Submit. You will be taken to the next sign-up page. 5. Create a [employer] ID. This will be your Pingboard login ID and cannot be changed, so think of one that is secure and easy to remember. 6. Create a Pingboard password. You can change your password at any time. 7. Enter your Password Reset Question and Answer. This can be used at a later time if you forget your password. 8. Enter your e-mail address. You will receive e-mail notification when new information is available in [address] 9. Click Sign Up. You can now view and download portions of your medical record. 10. Click the Download Summary menu link to download a portable copy of your medical information. If you have questions, please visit the Frequently Asked Questions section of the [employer] website. Remember, [employer] is NOT to be used for urgent needs. For medical emergencies, dial 911. Now available from your iPhone and Android! Please provide this summary of care documentation to your next provider. Your primary care clinician is listed as Susi Melo. If you have any questions after today's visit, please call [phone]. No

## 2019-05-03 ENCOUNTER — HOSPITAL ENCOUNTER (OUTPATIENT)
Dept: PREADMISSION TESTING | Age: 49
Discharge: HOME | End: 2019-05-03
Payer: COMMERCIAL

## 2019-05-03 VITALS
WEIGHT: 189 LBS | RESPIRATION RATE: 17 BRPM | DIASTOLIC BLOOD PRESSURE: 71 MMHG | TEMPERATURE: 98.1 F | OXYGEN SATURATION: 100 % | HEART RATE: 64 BPM | HEIGHT: 63 IN | BODY MASS INDEX: 33.49 KG/M2 | SYSTOLIC BLOOD PRESSURE: 140 MMHG

## 2019-05-03 LAB
25(OH)D3 SERPL-MCNC: 30.3 NG/ML (ref 30–100)
ABO + RH BLD: NORMAL
ALBUMIN SERPL-MCNC: 4.2 G/DL (ref 3.5–5)
ALBUMIN/GLOB SERPL: 1.4 {RATIO} (ref 1.1–2.2)
ALP SERPL-CCNC: 67 U/L (ref 45–117)
ALT SERPL-CCNC: 18 U/L (ref 12–78)
ANION GAP SERPL CALC-SCNC: 1 MMOL/L (ref 5–15)
APPEARANCE UR: CLEAR
APTT PPP: 29.6 SEC (ref 22.1–32)
AST SERPL-CCNC: 15 U/L (ref 15–37)
ATRIAL RATE: 59 BPM
BACTERIA URNS QL MICRO: ABNORMAL /HPF
BASOPHILS # BLD: 0 K/UL (ref 0–0.1)
BASOPHILS NFR BLD: 1 % (ref 0–1)
BILIRUB SERPL-MCNC: 0.6 MG/DL (ref 0.2–1)
BILIRUB UR QL: NEGATIVE
BLOOD GROUP ANTIBODIES SERPL: NORMAL
BUN SERPL-MCNC: 12 MG/DL (ref 6–20)
BUN/CREAT SERPL: 18 (ref 12–20)
CALCIUM SERPL-MCNC: 8.6 MG/DL (ref 8.5–10.1)
CALCULATED P AXIS, ECG09: 58 DEGREES
CALCULATED R AXIS, ECG10: 56 DEGREES
CALCULATED T AXIS, ECG11: 33 DEGREES
CHLORIDE SERPL-SCNC: 106 MMOL/L (ref 97–108)
CO2 SERPL-SCNC: 32 MMOL/L (ref 21–32)
COLOR UR: ABNORMAL
CREAT SERPL-MCNC: 0.66 MG/DL (ref 0.55–1.02)
DIAGNOSIS, 93000: NORMAL
DIFFERENTIAL METHOD BLD: ABNORMAL
EOSINOPHIL # BLD: 0.1 K/UL (ref 0–0.4)
EOSINOPHIL NFR BLD: 2 % (ref 0–7)
EPITH CASTS URNS QL MICRO: ABNORMAL /LPF
ERYTHROCYTE [DISTWIDTH] IN BLOOD BY AUTOMATED COUNT: 13.4 % (ref 11.5–14.5)
EST. AVERAGE GLUCOSE BLD GHB EST-MCNC: 117 MG/DL
GLOBULIN SER CALC-MCNC: 3 G/DL (ref 2–4)
GLUCOSE SERPL-MCNC: 84 MG/DL (ref 65–100)
GLUCOSE UR STRIP.AUTO-MCNC: NEGATIVE MG/DL
HBA1C MFR BLD: 5.7 % (ref 4.2–6.3)
HCT VFR BLD AUTO: 40 % (ref 35–47)
HGB BLD-MCNC: 12.7 G/DL (ref 11.5–16)
HGB UR QL STRIP: ABNORMAL
HYALINE CASTS URNS QL MICRO: ABNORMAL /LPF (ref 0–5)
IMM GRANULOCYTES # BLD AUTO: 0 K/UL (ref 0–0.04)
IMM GRANULOCYTES NFR BLD AUTO: 0 % (ref 0–0.5)
INR PPP: 1 (ref 0.9–1.1)
KETONES UR QL STRIP.AUTO: NEGATIVE MG/DL
LEUKOCYTE ESTERASE UR QL STRIP.AUTO: NEGATIVE
LYMPHOCYTES # BLD: 2.3 K/UL (ref 0.8–3.5)
LYMPHOCYTES NFR BLD: 52 % (ref 12–49)
MCH RBC QN AUTO: 30.2 PG (ref 26–34)
MCHC RBC AUTO-ENTMCNC: 31.8 G/DL (ref 30–36.5)
MCV RBC AUTO: 95 FL (ref 80–99)
MONOCYTES # BLD: 0.4 K/UL (ref 0–1)
MONOCYTES NFR BLD: 9 % (ref 5–13)
NEUTS SEG # BLD: 1.6 K/UL (ref 1.8–8)
NEUTS SEG NFR BLD: 36 % (ref 32–75)
NITRITE UR QL STRIP.AUTO: NEGATIVE
NRBC # BLD: 0 K/UL (ref 0–0.01)
NRBC BLD-RTO: 0 PER 100 WBC
P-R INTERVAL, ECG05: 154 MS
PH UR STRIP: 7 [PH] (ref 5–8)
PLATELET # BLD AUTO: 275 K/UL (ref 150–400)
PMV BLD AUTO: 10.3 FL (ref 8.9–12.9)
POTASSIUM SERPL-SCNC: 4.7 MMOL/L (ref 3.5–5.1)
PROT SERPL-MCNC: 7.2 G/DL (ref 6.4–8.2)
PROT UR STRIP-MCNC: NEGATIVE MG/DL
PROTHROMBIN TIME: 10.3 SEC (ref 9–11.1)
Q-T INTERVAL, ECG07: 442 MS
QRS DURATION, ECG06: 76 MS
QTC CALCULATION (BEZET), ECG08: 437 MS
RBC # BLD AUTO: 4.21 M/UL (ref 3.8–5.2)
RBC #/AREA URNS HPF: ABNORMAL /HPF (ref 0–5)
SODIUM SERPL-SCNC: 139 MMOL/L (ref 136–145)
SP GR UR REFRACTOMETRY: 1.02 (ref 1–1.03)
SPECIMEN EXP DATE BLD: NORMAL
THERAPEUTIC RANGE,PTTT: NORMAL SECS (ref 58–77)
UA: UC IF INDICATED,UAUC: ABNORMAL
UROBILINOGEN UR QL STRIP.AUTO: 1 EU/DL (ref 0.2–1)
VENTRICULAR RATE, ECG03: 59 BPM
WBC # BLD AUTO: 4.4 K/UL (ref 3.6–11)
WBC URNS QL MICRO: ABNORMAL /HPF (ref 0–4)

## 2019-05-03 PROCEDURE — 87086 URINE CULTURE/COLONY COUNT: CPT

## 2019-05-03 PROCEDURE — 85025 COMPLETE CBC W/AUTO DIFF WBC: CPT

## 2019-05-03 PROCEDURE — 83036 HEMOGLOBIN GLYCOSYLATED A1C: CPT

## 2019-05-03 PROCEDURE — 80053 COMPREHEN METABOLIC PANEL: CPT

## 2019-05-03 PROCEDURE — 82306 VITAMIN D 25 HYDROXY: CPT

## 2019-05-03 PROCEDURE — 36415 COLL VENOUS BLD VENIPUNCTURE: CPT

## 2019-05-03 PROCEDURE — 86900 BLOOD TYPING SEROLOGIC ABO: CPT

## 2019-05-03 PROCEDURE — 81001 URINALYSIS AUTO W/SCOPE: CPT

## 2019-05-03 PROCEDURE — 85610 PROTHROMBIN TIME: CPT

## 2019-05-03 PROCEDURE — 85730 THROMBOPLASTIN TIME PARTIAL: CPT

## 2019-05-03 PROCEDURE — 93005 ELECTROCARDIOGRAM TRACING: CPT

## 2019-05-03 RX ORDER — CHOLECALCIFEROL (VITAMIN D3) 125 MCG
1 CAPSULE ORAL
COMMUNITY

## 2019-05-03 RX ORDER — GABAPENTIN 600 MG/1
1800 TABLET ORAL
Status: ON HOLD | COMMUNITY
End: 2019-05-07

## 2019-05-03 RX ORDER — LORATADINE 10 MG/1
10 TABLET ORAL
COMMUNITY

## 2019-05-03 NOTE — PERIOP NOTES
Chlor-hex  wash reviewed, incentive spirometry/deep breathing, leg exercises to prevent DVT, s/s of infection & what to report to MD all reviewed with patient    Pt verified understanding by teach back and return demonstration        Discussed patient's Hx + PPD c Charla Gupta NP   Per Ms Walkera Denver since pt had negative CXR in 10/18 does not need CXR at this time

## 2019-05-03 NOTE — PERIOP NOTES
[address] [address] [facility]                            MAIN OR                                  [phone]   MAIN PRE OP                          [phone]                                                                                AMBULATORY PRE OP          [phone]  PRE-ADMISSION TESTING    [phone]     Surgery Date:   5/7/2019         Is surgery arrival time given by surgeon? NO  If NO, [facility] staff will call you between 3 and 7pm the day before your surgery with your arrival time. (If your surgery is on a Monday, we will call you the Friday before.)    Call [phone] after 7pm Monday-Friday if you did not receive your arrival time. INSTRUCTIONS BEFORE YOUR SURGERY   When You  Arrive   Arrive at the 2nd [address] [facility] on the day of your surgery  Have your insurance card, photo ID, and any copayment (if needed)     Food   and   Drink   NO food or drink after midnight the night before surgery    This means NO water, gum, mints, coffee, juice, etc.  No alcohol (beer, wine, liquor) 24 hours before and after surgery     Medications to   TAKE   Morning of Surgery   MEDICATIONS TO TAKE THE MORNING OF SURGERY WITH A SIP OF WATER:    NONE     Medications  To  STOP      7 days before surgery    Non-Steroidal anti-inflammatory Drugs (NSAID's): for example, Ibuprofen (Advil, Motrin), Naproxen (Aleve)   Aspirin, if taking for pain    Herbal supplements, vitamins, and fish oil   Other:  (Pain medications not listed above, including Tylenol may be taken)   Blood  Thinners    If you take  Aspirin, Plavix, Coumadin, or any blood-thinning or anti-blood clot medicine, talk to the doctor who prescribed the medications for pre-operative instructions.      Bathing Clothing  Jewelry  Valuables       If you shower the morning of surgery, please do not apply anything to your skin (lotions, powders, deodorant, or makeup, especially cristofer)   Follow all special bath instructions (for total joint replacement, spine and bowel surgeries)   Do not shave or trim anywhere 24 hours before surgery   Wear your hair loose or down; no pony-tails, buns, or metal hair clips   Wear loose, comfortable, clean clothes   Wear glasses instead of contacts   Leave money, valuables, and jewelry, including body piercings, at home     Going Home       or Spending the Night    SAME-DAY SURGERY: You must have a responsible adult drive you home and stay with you 24 hours after surgery   ADMITS: If your doctor is keeping you into the hospital after surgery, leave personal belongings/luggage in your car until you have a hospital room number. Hospital discharge time is 12 noon  Drivers must be here before 12 noon unless you are told differently   Special Instructions     Special Instructions:  · Use Chlorhexidine Care Fusion wash and sponges 3 days prior to surgery as instructed. · Incentive spirometer given with instructions to practice at home and bring back to the hospital on the day of surgery. · [facility] will contact you if your Hemoglobin A1C is greater than 7.5. · Ensure/Glucerna  sample, nutritional information, and Ensure/Glucerna coupon given. · Pain pamphlet and Call Don't Fall reminder reviewed with patient. ·  parking is complimentary Monday - Friday 7 am - 5 pm  · Bring PTA Medication list day of surgery with the last doses taken documented       Follow all instructions so your surgery wont be cancelled. Please, be on time. If a situation occurs and you are delayed the day of surgery, call [phone]. If your physical condition changes (like a fever, cold, flu, etc.) call your surgeon. The patient was contacted  in person. Home medication reviewed and verified during PAT appointment. The patient verbalizes understanding of all instructions and does not  need reinforcement.

## 2019-05-03 NOTE — PERIOP NOTES
Spoke with Shanelle at Dr Angel Silva office requested that surgical posting / order  be clarified as they do not match word for word    Pt was consented via order for C5-C6 Anterior Cervical discectomy & fusion with instrumentation    Posting reads C5- C6 Anterior Cervical Fusion with instrumentation    Referred to  Richy Melendrez, Dr Angel Melendrez - confirmed that surgical order is for a C5-C6 Anterior cervical discectomy and fusion with instrumentation and that posting should read the same

## 2019-05-04 LAB
BACTERIA SPEC CULT: NORMAL
CC UR VC: NORMAL
SERVICE CMNT-IMP: NORMAL
SERVICE CMNT-IMP: NORMAL

## 2019-05-06 ENCOUNTER — ANESTHESIA EVENT (OUTPATIENT)
Dept: SURGERY | Age: 49
End: 2019-05-06
Payer: COMMERCIAL
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
## 2019-05-07 ENCOUNTER — ANESTHESIA (OUTPATIENT)
Dept: SURGERY | Age: 49
End: 2019-05-07
Payer: COMMERCIAL

## 2019-05-07 ENCOUNTER — HOSPITAL ENCOUNTER (OUTPATIENT)
Age: 49
Setting detail: OBSERVATION
Discharge: HOME | End: 2019-05-08
Attending: ORTHOPAEDIC SURGERY | Admitting: ORTHOPAEDIC SURGERY
Payer: COMMERCIAL

## 2019-05-07 ENCOUNTER — APPOINTMENT (OUTPATIENT)
Dept: GENERAL RADIOLOGY | Age: 49
End: 2019-05-07
Attending: ORTHOPAEDIC SURGERY
Payer: COMMERCIAL

## 2019-05-07 DIAGNOSIS — M48.02 CERVICAL STENOSIS OF SPINAL CANAL: Primary | ICD-10-CM

## 2019-05-07 LAB — HCG UR QL: NEGATIVE

## 2019-05-07 PROCEDURE — 77030002933 HC SUT MCRYL J&J -A: Performed by: ORTHOPAEDIC SURGERY

## 2019-05-07 PROCEDURE — 77030030102 HC BIT DRL PYRNES K2M -B: Performed by: ORTHOPAEDIC SURGERY

## 2019-05-07 PROCEDURE — 77030004391 HC BUR FLUT MEDT -C: Performed by: ORTHOPAEDIC SURGERY

## 2019-05-07 PROCEDURE — 74011250637 HC RX REV CODE- 250/637: Performed by: ORTHOPAEDIC SURGERY

## 2019-05-07 PROCEDURE — 74011250636 HC RX REV CODE- 250/636: Performed by: ORTHOPAEDIC SURGERY

## 2019-05-07 PROCEDURE — 77030019908 HC STETH ESOPH SIMS -A: Performed by: NURSE ANESTHETIST, CERTIFIED REGISTERED

## 2019-05-07 PROCEDURE — 77030037302 HC SPCR CERV LORDTC INLC -G: Performed by: ORTHOPAEDIC SURGERY

## 2019-05-07 PROCEDURE — 77030018836 HC SOL IRR NACL ICUM -A: Performed by: ORTHOPAEDIC SURGERY

## 2019-05-07 PROCEDURE — 77030011267 HC ELECTRD BLD COVD -A: Performed by: ORTHOPAEDIC SURGERY

## 2019-05-07 PROCEDURE — 99218 HC RM OBSERVATION: CPT

## 2019-05-07 PROCEDURE — C1713 ANCHOR/SCREW BN/BN,TIS/BN: HCPCS | Performed by: ORTHOPAEDIC SURGERY

## 2019-05-07 PROCEDURE — 74011250636 HC RX REV CODE- 250/636

## 2019-05-07 PROCEDURE — 77030029099 HC BN WAX SSPC -A: Performed by: ORTHOPAEDIC SURGERY

## 2019-05-07 PROCEDURE — 74011000250 HC RX REV CODE- 250

## 2019-05-07 PROCEDURE — 76010000162 HC OR TIME 1.5 TO 2 HR INTENSV-TIER 1: Performed by: ORTHOPAEDIC SURGERY

## 2019-05-07 PROCEDURE — 77030034850: Performed by: ORTHOPAEDIC SURGERY

## 2019-05-07 PROCEDURE — 74011000272 HC RX REV CODE- 272: Performed by: ORTHOPAEDIC SURGERY

## 2019-05-07 PROCEDURE — 77030020782 HC GWN BAIR PAWS FLX 3M -B

## 2019-05-07 PROCEDURE — 77030026438 HC STYL ET INTUB CARD -A: Performed by: NURSE ANESTHETIST, CERTIFIED REGISTERED

## 2019-05-07 PROCEDURE — 74011250636 HC RX REV CODE- 250/636: Performed by: ANESTHESIOLOGY

## 2019-05-07 PROCEDURE — 77030012406 HC DRN WND PENRS BARD -A: Performed by: ORTHOPAEDIC SURGERY

## 2019-05-07 PROCEDURE — 77030040356 HC CORD BPLR FRCP COVD -A: Performed by: ORTHOPAEDIC SURGERY

## 2019-05-07 PROCEDURE — 81025 URINE PREGNANCY TEST: CPT

## 2019-05-07 PROCEDURE — 77030032490 HC SLV COMPR SCD KNE COVD -B

## 2019-05-07 PROCEDURE — 77030018673: Performed by: ORTHOPAEDIC SURGERY

## 2019-05-07 PROCEDURE — 77030008684 HC TU ET CUF COVD -B: Performed by: NURSE ANESTHETIST, CERTIFIED REGISTERED

## 2019-05-07 PROCEDURE — 76060000034 HC ANESTHESIA 1.5 TO 2 HR: Performed by: ORTHOPAEDIC SURGERY

## 2019-05-07 PROCEDURE — 77030018846 HC SOL IRR STRL H20 ICUM -A: Performed by: ORTHOPAEDIC SURGERY

## 2019-05-07 PROCEDURE — 77030003666 HC NDL SPINAL BD -A: Performed by: ORTHOPAEDIC SURGERY

## 2019-05-07 PROCEDURE — 77030031139 HC SUT VCRL2 J&J -A: Performed by: ORTHOPAEDIC SURGERY

## 2019-05-07 PROCEDURE — 76210000016 HC OR PH I REC 1 TO 1.5 HR: Performed by: ORTHOPAEDIC SURGERY

## 2019-05-07 PROCEDURE — 77030011640 HC PAD GRND REM COVD -A: Performed by: ORTHOPAEDIC SURGERY

## 2019-05-07 PROCEDURE — 74011000250 HC RX REV CODE- 250: Performed by: ORTHOPAEDIC SURGERY

## 2019-05-07 DEVICE — SCREW SPNL L12MM DIA4MM CERV ST CONSTRN LO PROF TIFIX LCK: Type: IMPLANTABLE DEVICE | Site: SPINE CERVICAL | Status: FUNCTIONAL

## 2019-05-07 DEVICE — SCREW SPNL L14MM DIA4MM CERV ST CONSTRN PYRENEES: Type: IMPLANTABLE DEVICE | Site: SPINE CERVICAL | Status: FUNCTIONAL

## 2019-05-07 DEVICE — IMPLANTABLE DEVICE: Type: IMPLANTABLE DEVICE | Site: SPINE CERVICAL | Status: FUNCTIONAL

## 2019-05-07 DEVICE — PLATE SPNL L18MM BILAT ANTR CERV TI 1 LEV CONSTRN LO PROF: Type: IMPLANTABLE DEVICE | Site: SPINE CERVICAL | Status: FUNCTIONAL

## 2019-05-07 RX ORDER — CEFAZOLIN SODIUM/WATER 2 G/20 ML
2 SYRINGE (ML) INTRAVENOUS EVERY 8 HOURS
Status: COMPLETED | OUTPATIENT
Start: 2019-05-07 | End: 2019-05-08

## 2019-05-07 RX ORDER — PROPOFOL 10 MG/ML
INJECTION, EMULSION INTRAVENOUS AS NEEDED
Status: DISCONTINUED | OUTPATIENT
Start: 2019-05-07 | End: 2019-05-07 | Stop reason: HOSPADM

## 2019-05-07 RX ORDER — OXYCODONE HYDROCHLORIDE 5 MG/1
5 TABLET ORAL
Status: DISCONTINUED | OUTPATIENT
Start: 2019-05-07 | End: 2019-05-08 | Stop reason: HOSPADM

## 2019-05-07 RX ORDER — POLYETHYLENE GLYCOL 3350 17 G/17G
17 POWDER, FOR SOLUTION ORAL DAILY
Status: DISCONTINUED | OUTPATIENT
Start: 2019-05-08 | End: 2019-05-08 | Stop reason: HOSPADM

## 2019-05-07 RX ORDER — CEFAZOLIN SODIUM/WATER 2 G/20 ML
2 SYRINGE (ML) INTRAVENOUS ONCE
Status: COMPLETED | OUTPATIENT
Start: 2019-05-07 | End: 2019-05-07

## 2019-05-07 RX ORDER — METHOCARBAMOL 500 MG/1
750 TABLET, FILM COATED ORAL
Status: DISCONTINUED | OUTPATIENT
Start: 2019-05-07 | End: 2019-05-08 | Stop reason: HOSPADM

## 2019-05-07 RX ORDER — LIDOCAINE HYDROCHLORIDE 10 MG/ML
0.1 INJECTION, SOLUTION EPIDURAL; INFILTRATION; INTRACAUDAL; PERINEURAL AS NEEDED
Status: DISCONTINUED | OUTPATIENT
Start: 2019-05-07 | End: 2019-05-07 | Stop reason: HOSPADM

## 2019-05-07 RX ORDER — NEOSTIGMINE METHYLSULFATE 1 MG/ML
INJECTION INTRAVENOUS AS NEEDED
Status: DISCONTINUED | OUTPATIENT
Start: 2019-05-07 | End: 2019-05-07 | Stop reason: HOSPADM

## 2019-05-07 RX ORDER — FENTANYL CITRATE 50 UG/ML
INJECTION, SOLUTION INTRAMUSCULAR; INTRAVENOUS AS NEEDED
Status: DISCONTINUED | OUTPATIENT
Start: 2019-05-07 | End: 2019-05-07 | Stop reason: HOSPADM

## 2019-05-07 RX ORDER — HYDROMORPHONE HCL/0.9% NACL/PF 0.5 MG/ML
PLASTIC BAG, INJECTION (ML) INTRAVENOUS
Status: DISCONTINUED | OUTPATIENT
Start: 2019-05-07 | End: 2019-05-08

## 2019-05-07 RX ORDER — OXYCODONE HYDROCHLORIDE 5 MG/1
10 TABLET ORAL
Status: DISCONTINUED | OUTPATIENT
Start: 2019-05-07 | End: 2019-05-08 | Stop reason: HOSPADM

## 2019-05-07 RX ORDER — NALOXONE HYDROCHLORIDE 0.4 MG/ML
0.4 INJECTION, SOLUTION INTRAMUSCULAR; INTRAVENOUS; SUBCUTANEOUS AS NEEDED
Status: DISCONTINUED | OUTPATIENT
Start: 2019-05-07 | End: 2019-05-08 | Stop reason: HOSPADM

## 2019-05-07 RX ORDER — DEXAMETHASONE SODIUM PHOSPHATE 4 MG/ML
INJECTION, SOLUTION INTRA-ARTICULAR; INTRALESIONAL; INTRAMUSCULAR; INTRAVENOUS; SOFT TISSUE AS NEEDED
Status: DISCONTINUED | OUTPATIENT
Start: 2019-05-07 | End: 2019-05-07 | Stop reason: HOSPADM

## 2019-05-07 RX ORDER — BACLOFEN 10 MG/1
10 TABLET ORAL 3 TIMES DAILY
Status: DISCONTINUED | OUTPATIENT
Start: 2019-05-07 | End: 2019-05-08 | Stop reason: HOSPADM

## 2019-05-07 RX ORDER — AMOXICILLIN 250 MG
1 CAPSULE ORAL 2 TIMES DAILY
Status: DISCONTINUED | OUTPATIENT
Start: 2019-05-08 | End: 2019-05-08 | Stop reason: HOSPADM

## 2019-05-07 RX ORDER — SODIUM CHLORIDE 9 MG/ML
125 INJECTION, SOLUTION INTRAVENOUS CONTINUOUS
Status: DISPENSED | OUTPATIENT
Start: 2019-05-07 | End: 2019-05-08

## 2019-05-07 RX ORDER — GABAPENTIN 300 MG/1
600 CAPSULE ORAL 3 TIMES DAILY
Status: DISCONTINUED | OUTPATIENT
Start: 2019-05-07 | End: 2019-05-08 | Stop reason: HOSPADM

## 2019-05-07 RX ORDER — DIPHENHYDRAMINE HYDROCHLORIDE 50 MG/ML
12.5 INJECTION, SOLUTION INTRAMUSCULAR; INTRAVENOUS
Status: DISCONTINUED | OUTPATIENT
Start: 2019-05-07 | End: 2019-05-08 | Stop reason: HOSPADM

## 2019-05-07 RX ORDER — GLYCOPYRROLATE 0.2 MG/ML
INJECTION INTRAMUSCULAR; INTRAVENOUS AS NEEDED
Status: DISCONTINUED | OUTPATIENT
Start: 2019-05-07 | End: 2019-05-07 | Stop reason: HOSPADM

## 2019-05-07 RX ORDER — MELATONIN
2000
Status: DISCONTINUED | OUTPATIENT
Start: 2019-05-07 | End: 2019-05-08 | Stop reason: HOSPADM

## 2019-05-07 RX ORDER — SODIUM CHLORIDE 0.9 % (FLUSH) 0.9 %
5-40 SYRINGE (ML) INJECTION AS NEEDED
Status: DISCONTINUED | OUTPATIENT
Start: 2019-05-07 | End: 2019-05-08 | Stop reason: HOSPADM

## 2019-05-07 RX ORDER — SODIUM CHLORIDE, SODIUM LACTATE, POTASSIUM CHLORIDE, CALCIUM CHLORIDE 600; 310; 30; 20 MG/100ML; MG/100ML; MG/100ML; MG/100ML
125 INJECTION, SOLUTION INTRAVENOUS CONTINUOUS
Status: DISCONTINUED | OUTPATIENT
Start: 2019-05-07 | End: 2019-05-07 | Stop reason: HOSPADM

## 2019-05-07 RX ORDER — FACIAL-BODY WIPES
10 EACH TOPICAL DAILY PRN
Status: DISCONTINUED | OUTPATIENT
Start: 2019-05-09 | End: 2019-05-08 | Stop reason: HOSPADM

## 2019-05-07 RX ORDER — ONDANSETRON 2 MG/ML
INJECTION INTRAMUSCULAR; INTRAVENOUS AS NEEDED
Status: DISCONTINUED | OUTPATIENT
Start: 2019-05-07 | End: 2019-05-07 | Stop reason: HOSPADM

## 2019-05-07 RX ORDER — HYDROMORPHONE HYDROCHLORIDE 2 MG/ML
0.5 INJECTION, SOLUTION INTRAMUSCULAR; INTRAVENOUS; SUBCUTANEOUS
Status: ACTIVE | OUTPATIENT
Start: 2019-05-07 | End: 2019-05-08

## 2019-05-07 RX ORDER — FLUMAZENIL 0.1 MG/ML
0.2 INJECTION INTRAVENOUS
Status: DISCONTINUED | OUTPATIENT
Start: 2019-05-07 | End: 2019-05-07 | Stop reason: HOSPADM

## 2019-05-07 RX ORDER — ROCURONIUM BROMIDE 10 MG/ML
INJECTION, SOLUTION INTRAVENOUS AS NEEDED
Status: DISCONTINUED | OUTPATIENT
Start: 2019-05-07 | End: 2019-05-07 | Stop reason: HOSPADM

## 2019-05-07 RX ORDER — LIDOCAINE HYDROCHLORIDE 20 MG/ML
INJECTION, SOLUTION EPIDURAL; INFILTRATION; INTRACAUDAL; PERINEURAL AS NEEDED
Status: DISCONTINUED | OUTPATIENT
Start: 2019-05-07 | End: 2019-05-07 | Stop reason: HOSPADM

## 2019-05-07 RX ORDER — SODIUM CHLORIDE 0.9 % (FLUSH) 0.9 %
5-40 SYRINGE (ML) INJECTION EVERY 8 HOURS
Status: DISCONTINUED | OUTPATIENT
Start: 2019-05-07 | End: 2019-05-08 | Stop reason: HOSPADM

## 2019-05-07 RX ORDER — EPHEDRINE SULFATE 50 MG/ML
INJECTION, SOLUTION INTRAVENOUS AS NEEDED
Status: DISCONTINUED | OUTPATIENT
Start: 2019-05-07 | End: 2019-05-07 | Stop reason: HOSPADM

## 2019-05-07 RX ORDER — MIDAZOLAM HYDROCHLORIDE 1 MG/ML
INJECTION, SOLUTION INTRAMUSCULAR; INTRAVENOUS AS NEEDED
Status: DISCONTINUED | OUTPATIENT
Start: 2019-05-07 | End: 2019-05-07 | Stop reason: HOSPADM

## 2019-05-07 RX ORDER — FAMOTIDINE 20 MG/1
20 TABLET, FILM COATED ORAL 2 TIMES DAILY
Status: DISCONTINUED | OUTPATIENT
Start: 2019-05-07 | End: 2019-05-08 | Stop reason: HOSPADM

## 2019-05-07 RX ORDER — DIPHENHYDRAMINE HYDROCHLORIDE 50 MG/ML
12.5 INJECTION, SOLUTION INTRAMUSCULAR; INTRAVENOUS AS NEEDED
Status: DISCONTINUED | OUTPATIENT
Start: 2019-05-07 | End: 2019-05-07 | Stop reason: HOSPADM

## 2019-05-07 RX ORDER — HYDROMORPHONE HYDROCHLORIDE 1 MG/ML
.25-1 INJECTION, SOLUTION INTRAMUSCULAR; INTRAVENOUS; SUBCUTANEOUS
Status: DISCONTINUED | OUTPATIENT
Start: 2019-05-07 | End: 2019-05-07 | Stop reason: HOSPADM

## 2019-05-07 RX ORDER — NALOXONE HYDROCHLORIDE 0.4 MG/ML
0.2 INJECTION, SOLUTION INTRAMUSCULAR; INTRAVENOUS; SUBCUTANEOUS
Status: DISCONTINUED | OUTPATIENT
Start: 2019-05-07 | End: 2019-05-07 | Stop reason: HOSPADM

## 2019-05-07 RX ORDER — PHENYLEPHRINE HCL IN 0.9% NACL 0.4MG/10ML
SYRINGE (ML) INTRAVENOUS AS NEEDED
Status: DISCONTINUED | OUTPATIENT
Start: 2019-05-07 | End: 2019-05-07 | Stop reason: HOSPADM

## 2019-05-07 RX ORDER — ACETAMINOPHEN 325 MG/1
650 TABLET ORAL
Status: DISCONTINUED | OUTPATIENT
Start: 2019-05-07 | End: 2019-05-08 | Stop reason: HOSPADM

## 2019-05-07 RX ORDER — ONDANSETRON 2 MG/ML
4 INJECTION INTRAMUSCULAR; INTRAVENOUS
Status: DISPENSED | OUTPATIENT
Start: 2019-05-07 | End: 2019-05-08

## 2019-05-07 RX ORDER — CEFAZOLIN SODIUM 1 G/3ML
2 INJECTION, POWDER, FOR SOLUTION INTRAMUSCULAR; INTRAVENOUS
Status: DISCONTINUED | OUTPATIENT
Start: 2019-05-07 | End: 2019-05-07

## 2019-05-07 RX ORDER — FERROUS SULFATE, DRIED 160(50) MG
1 TABLET, EXTENDED RELEASE ORAL DAILY
Status: DISCONTINUED | OUTPATIENT
Start: 2019-05-08 | End: 2019-05-08 | Stop reason: HOSPADM

## 2019-05-07 RX ADMIN — LIDOCAINE HYDROCHLORIDE 40 MG: 20 INJECTION, SOLUTION EPIDURAL; INFILTRATION; INTRACAUDAL; PERINEURAL at 07:38

## 2019-05-07 RX ADMIN — Medication: at 09:40

## 2019-05-07 RX ADMIN — FAMOTIDINE 20 MG: 20 TABLET ORAL at 21:26

## 2019-05-07 RX ADMIN — Medication 10 ML: at 14:00

## 2019-05-07 RX ADMIN — PROPOFOL 150 MG: 10 INJECTION, EMULSION INTRAVENOUS at 07:38

## 2019-05-07 RX ADMIN — BACLOFEN 10 MG: 10 TABLET ORAL at 21:38

## 2019-05-07 RX ADMIN — DEXAMETHASONE SODIUM PHOSPHATE 10 MG: 4 INJECTION, SOLUTION INTRA-ARTICULAR; INTRALESIONAL; INTRAMUSCULAR; INTRAVENOUS; SOFT TISSUE at 07:51

## 2019-05-07 RX ADMIN — Medication 2 G: at 07:47

## 2019-05-07 RX ADMIN — HYDROMORPHONE HYDROCHLORIDE 0.5 MG: 1 INJECTION, SOLUTION INTRAMUSCULAR; INTRAVENOUS; SUBCUTANEOUS at 10:10

## 2019-05-07 RX ADMIN — NEOSTIGMINE METHYLSULFATE 3 MG: 1 INJECTION INTRAVENOUS at 08:53

## 2019-05-07 RX ADMIN — GLYCOPYRROLATE 0.5 MG: 0.2 INJECTION INTRAMUSCULAR; INTRAVENOUS at 08:53

## 2019-05-07 RX ADMIN — MIDAZOLAM HYDROCHLORIDE 1 MG: 1 INJECTION, SOLUTION INTRAMUSCULAR; INTRAVENOUS at 07:30

## 2019-05-07 RX ADMIN — EPHEDRINE SULFATE 10 MG: 50 INJECTION, SOLUTION INTRAVENOUS at 08:47

## 2019-05-07 RX ADMIN — FENTANYL CITRATE 50 MCG: 50 INJECTION, SOLUTION INTRAMUSCULAR; INTRAVENOUS at 07:38

## 2019-05-07 RX ADMIN — EPHEDRINE SULFATE 10 MG: 50 INJECTION, SOLUTION INTRAVENOUS at 08:02

## 2019-05-07 RX ADMIN — FENTANYL CITRATE 50 MCG: 50 INJECTION, SOLUTION INTRAMUSCULAR; INTRAVENOUS at 08:03

## 2019-05-07 RX ADMIN — PROPOFOL 20 MG: 10 INJECTION, EMULSION INTRAVENOUS at 07:40

## 2019-05-07 RX ADMIN — ONDANSETRON 4 MG: 2 INJECTION INTRAMUSCULAR; INTRAVENOUS at 11:27

## 2019-05-07 RX ADMIN — Medication 40 MCG: at 08:23

## 2019-05-07 RX ADMIN — FENTANYL CITRATE 50 MCG: 50 INJECTION, SOLUTION INTRAMUSCULAR; INTRAVENOUS at 07:30

## 2019-05-07 RX ADMIN — ONDANSETRON 4 MG: 2 INJECTION INTRAMUSCULAR; INTRAVENOUS at 08:42

## 2019-05-07 RX ADMIN — MIDAZOLAM HYDROCHLORIDE 2 MG: 1 INJECTION, SOLUTION INTRAMUSCULAR; INTRAVENOUS at 07:28

## 2019-05-07 RX ADMIN — ONDANSETRON 4 MG: 2 INJECTION INTRAMUSCULAR; INTRAVENOUS at 15:02

## 2019-05-07 RX ADMIN — FENTANYL CITRATE 50 MCG: 50 INJECTION, SOLUTION INTRAMUSCULAR; INTRAVENOUS at 08:08

## 2019-05-07 RX ADMIN — FENTANYL CITRATE 50 MCG: 50 INJECTION, SOLUTION INTRAMUSCULAR; INTRAVENOUS at 08:11

## 2019-05-07 RX ADMIN — SODIUM CHLORIDE 125 ML/HR: 900 INJECTION, SOLUTION INTRAVENOUS at 09:30

## 2019-05-07 RX ADMIN — Medication 2 G: at 23:32

## 2019-05-07 RX ADMIN — ROCURONIUM BROMIDE 30 MG: 10 INJECTION, SOLUTION INTRAVENOUS at 07:39

## 2019-05-07 RX ADMIN — Medication 80 MCG: at 07:49

## 2019-05-07 RX ADMIN — ONDANSETRON 4 MG: 2 INJECTION INTRAMUSCULAR; INTRAVENOUS at 23:44

## 2019-05-07 RX ADMIN — Medication 10 ML: at 21:27

## 2019-05-07 RX ADMIN — ROCURONIUM BROMIDE 10 MG: 10 INJECTION, SOLUTION INTRAVENOUS at 07:38

## 2019-05-07 RX ADMIN — BACLOFEN 10 MG: 10 TABLET ORAL at 15:02

## 2019-05-07 RX ADMIN — Medication 2 G: at 15:02

## 2019-05-07 RX ADMIN — SODIUM CHLORIDE, SODIUM LACTATE, POTASSIUM CHLORIDE, AND CALCIUM CHLORIDE 125 ML/HR: 600; 310; 30; 20 INJECTION, SOLUTION INTRAVENOUS at 06:32

## 2019-05-07 NOTE — PERIOP NOTES
TRANSFER - OUT REPORT:    Verbal report given to Nikita Santiago RN on Nicolette Be  being transferred to [facility] for routine post - op       Report consisted of patients Situation, Background, Assessment and   Recommendations(SBAR). Information from the following report(s) SBAR, Kardex, OR Summary and MAR was reviewed with the receiving nurse. Lines:   Peripheral IV 05/07/19 Left Antecubital (Active)   Site Assessment Clean, dry, & intact 5/7/2019  9:20 AM   Phlebitis Assessment 0 5/7/2019  9:20 AM   Infiltration Assessment 0 5/7/2019  9:20 AM   Dressing Status Clean, dry, & intact 5/7/2019  9:20 AM   Dressing Type Tape;Transparent 5/7/2019  9:20 AM   Hub Color/Line Status Pink; Infusing;Patent 5/7/2019  9:20 AM   Action Taken Open ports on tubing capped 5/7/2019  9:20 AM   Alcohol Cap Used Yes 5/7/2019  9:20 AM        Opportunity for questions and clarification was provided.       Patient transported with:   O2 @ 2 liters  Registered Nurse

## 2019-05-07 NOTE — ANESTHESIA POSTPROCEDURE EVALUATION
Procedure(s): 
C5-C6  ANTERIOR CERVICAL FUSION WITH INSTRUMENTATION. general 
 
Anesthesia Post Evaluation Multimodal analgesia: multimodal analgesia not used between 6 hours prior to anesthesia start to PACU discharge Patient location during evaluation: PACU Patient participation: complete - patient participated Level of consciousness: awake Pain management: adequate Airway patency: patent Anesthetic complications: no 
Cardiovascular status: acceptable, blood pressure returned to baseline and hemodynamically stable Respiratory status: acceptable Hydration status: acceptable Post anesthesia nausea and vomiting:  controlled Vitals Value Taken Time /72 5/7/2019 10:05 AM  
Temp 36.3 °C (97.3 °F) 5/7/2019  9:20 AM  
Pulse 80 5/7/2019 10:07 AM  
Resp 16 5/7/2019 10:07 AM  
SpO2 98 % 5/7/2019 10:07 AM  
Vitals shown include unvalidated device data.

## 2019-05-07 NOTE — ANESTHESIA PREPROCEDURE EVALUATION
Relevant Problems No relevant active problems Anesthetic History No history of anesthetic complications Review of Systems / Medical History Patient summary reviewed, nursing notes reviewed and pertinent labs reviewed Pulmonary Within defined limits Neuro/Psych Within defined limits Headaches Cardiovascular Within defined limits Exercise tolerance: >4 METS 
  
GI/Hepatic/Renal 
Within defined limits Endo/Other Within defined limits Arthritis and cancer Other Findings Comments: Breast ca Physical Exam 
 
Airway Mallampati: II 
 
Neck ROM: normal range of motion Mouth opening: Normal 
 
 Cardiovascular Regular rate and rhythm,  S1 and S2 normal,  no murmur, click, rub, or gallop Rhythm: regular Rate: normal 
 
 
 
 Dental 
No notable dental hx Pulmonary Breath sounds clear to auscultation Abdominal 
GI exam deferred Other Findings Anesthetic Plan ASA: 2 Anesthesia type: general 
 
 
 
 
Induction: Intravenous Anesthetic plan and risks discussed with: Patient

## 2019-05-07 NOTE — PERIOP NOTES
Pt's father, Diana Childs, updated on pt's care with plan for admission with understanding being verbalized. Awaiting room assignment.

## 2019-05-07 NOTE — OP NOTES
[address]  [address] [facility] [place], [address] [address]    OPERATIVE REPORT      NAME: Gayathri Malcolm    AGE: 51 y.o. YOB: 1970    MEDICAL RECORD NUMBER: [id number]    DATE OF SURGERY: 5/7/2019    OPERATIVE REPORT     PREOPERATIVE DIAGNOSIS: Cervical stenosis     POSTOPERATIVE DIAGNOSIS: Cervical stenosis    OPERATIVE PROCEDURE: C5 to C6 anterior cervical diskectomy and fusion with instrumentation and application of interbody spacer at C5-C6    SURGEON: Megan Joseph MD     ASSISTANT: KAROLYN Turk     ANESTHESIA: General    COMPLICATIONS: None    ESTIMATED BLOOD LOSS: 40 cc    INSTRUMENTATION: K2M plate, Seaspine Spacer    INDICATION FOR PROCEDURE: The patient is a very pleasant 51 y.o. female with cervical stenosis. The patient elected to proceed with operative intervention. She was aware of the risks, benefits, and alternatives. She was provided informed consent. PROCEDURE: The patient was identified in the preoperative holding area. The anterior cervical spine was marked by me. She was transferred to the operating room where general anesthesia was given. She was also given perioperative ancef antibiotics. The patient was placed supine on the operating room table. All bony prominences were well-padded. The shoulders were taped. The anterior cervical spine was prepped and draped in the usual standard fashion. We performed a surgical time-out. I made a skin incision on the left side. It was transverse. I exposed the anterior cervical spine. I placed a needle into the disk space to verify our level. I exposed the disc space with electrocautery from uncus to uncus. I brought in the operating room microscope. I performed a diskectomy at C5-C6. I decompressed the spinal cord and nerve roots bilaterally. I prepared the endplates to bleeding bone. We had good hemostasis. I performed trial sizing.  I placed a spacer into C5-C6 with the appropriate amount of tension and alignment. The spacer had allograft. I then placed an anterior cervical plate into C5 and C6. The screws were locked to the plate according to the manufacture's specification. We had good hemostasis. I copiously irrigated the entire wound. I placed a deep drain. The wound was closed with 3-0 Vicryl and 4-0 Monocryl. A sterile dressing was applied. The patient was extubated and transferred to the recovery room in good medical condition. The PA assisted with retraction and closure. I, Dr. Gabe Fairbanks, performed the above procedures.      Gabe Fairbanks MD  5/7/2019

## 2019-05-07 NOTE — H&P
Date of Surgery Update:  Pritesh Sen was seen and examined. History and physical has been reviewed. The patient has been examined.  There have been no significant clinical changes since the completion of the originally dated History and Physical.    Signed By: Niki Espinosa MD     May 7, 2019 7:28 AM

## 2019-05-08 ENCOUNTER — APPOINTMENT (OUTPATIENT)
Dept: GENERAL RADIOLOGY | Age: 49
End: 2019-05-08
Attending: ORTHOPAEDIC SURGERY
Payer: COMMERCIAL

## 2019-05-08 VITALS
HEART RATE: 72 BPM | WEIGHT: 188.93 LBS | HEIGHT: 63 IN | SYSTOLIC BLOOD PRESSURE: 125 MMHG | DIASTOLIC BLOOD PRESSURE: 70 MMHG | RESPIRATION RATE: 14 BRPM | BODY MASS INDEX: 33.48 KG/M2 | OXYGEN SATURATION: 97 % | TEMPERATURE: 98.2 F

## 2019-05-08 LAB
ANION GAP SERPL CALC-SCNC: 7 MMOL/L (ref 5–15)
BUN SERPL-MCNC: 13 MG/DL (ref 6–20)
BUN/CREAT SERPL: 17 (ref 12–20)
CALCIUM SERPL-MCNC: 8.4 MG/DL (ref 8.5–10.1)
CHLORIDE SERPL-SCNC: 107 MMOL/L (ref 97–108)
CO2 SERPL-SCNC: 28 MMOL/L (ref 21–32)
CREAT SERPL-MCNC: 0.76 MG/DL (ref 0.55–1.02)
GLUCOSE SERPL-MCNC: 101 MG/DL (ref 65–100)
HGB BLD-MCNC: 11 G/DL (ref 11.5–16)
POTASSIUM SERPL-SCNC: 3.9 MMOL/L (ref 3.5–5.1)
SODIUM SERPL-SCNC: 142 MMOL/L (ref 136–145)

## 2019-05-08 PROCEDURE — 99218 HC RM OBSERVATION: CPT

## 2019-05-08 PROCEDURE — 77010033678 HC OXYGEN DAILY

## 2019-05-08 PROCEDURE — 74011250637 HC RX REV CODE- 250/637: Performed by: ORTHOPAEDIC SURGERY

## 2019-05-08 PROCEDURE — 80048 BASIC METABOLIC PNL TOTAL CA: CPT

## 2019-05-08 PROCEDURE — 74011250636 HC RX REV CODE- 250/636: Performed by: ORTHOPAEDIC SURGERY

## 2019-05-08 PROCEDURE — 94760 N-INVAS EAR/PLS OXIMETRY 1: CPT

## 2019-05-08 PROCEDURE — 36415 COLL VENOUS BLD VENIPUNCTURE: CPT

## 2019-05-08 PROCEDURE — 85018 HEMOGLOBIN: CPT

## 2019-05-08 RX ORDER — OXYCODONE HYDROCHLORIDE 5 MG/1
5-10 TABLET ORAL
Qty: 56 TAB | Refills: 0 | Status: SHIPPED | OUTPATIENT
Start: 2019-05-08 | End: 2019-05-11

## 2019-05-08 RX ORDER — AMOXICILLIN 250 MG
1 CAPSULE ORAL 2 TIMES DAILY
Qty: 60 TAB | Refills: 0 | Status: SHIPPED | OUTPATIENT
Start: 2019-05-08 | End: 2019-06-07

## 2019-05-08 RX ADMIN — GABAPENTIN 600 MG: 300 CAPSULE ORAL at 08:34

## 2019-05-08 RX ADMIN — ACETAMINOPHEN 650 MG: 325 TABLET ORAL at 08:33

## 2019-05-08 RX ADMIN — Medication 10 ML: at 06:24

## 2019-05-08 RX ADMIN — VITAMIN D, TAB 1000IU (100/BT) 2000 UNITS: 25 TAB at 06:25

## 2019-05-08 RX ADMIN — BENZOCAINE, MENTHOL 1 LOZENGE: 15; 3.6 LOZENGE ORAL at 08:28

## 2019-05-08 RX ADMIN — POLYETHYLENE GLYCOL 3350 17 G: 17 POWDER, FOR SOLUTION ORAL at 08:34

## 2019-05-08 RX ADMIN — FAMOTIDINE 20 MG: 20 TABLET ORAL at 08:33

## 2019-05-08 RX ADMIN — OYSTER SHELL CALCIUM WITH VITAMIN D 1 TABLET: 500; 200 TABLET, FILM COATED ORAL at 08:34

## 2019-05-08 RX ADMIN — Medication 2 G: at 08:28

## 2019-05-08 RX ADMIN — BACLOFEN 10 MG: 10 TABLET ORAL at 08:33

## 2019-05-08 RX ADMIN — ONDANSETRON 4 MG: 2 INJECTION INTRAMUSCULAR; INTRAVENOUS at 08:28

## 2019-05-08 RX ADMIN — SENNOSIDES,DOCUSATE SODIUM 1 TABLET: 8.6; 5 TABLET, FILM COATED ORAL at 08:33

## 2019-05-08 NOTE — DISCHARGE INSTRUCTIONS
Vijaya Schmidt MD  [address] [facility]  Office Phone: [phone]  Neck Surgery Discharge Instructions  Activities   You are going home a well person, be as active as possible. Your only exercise should be walking. Start with short frequent walks and increase your walking distance each day. Start with walking twice a day for 5 minutes and increase your distance each day 2-3 minutes until you reach 25 minutes twice a day. Limit the amount of time you sit to 20-30 minute intervals. Sitting for prolonged periods of time will be uncomfortable for you following your surgery.  Do not lift anything over five pounds, and do not do any bending or straining.  Avoid reaching overhead in this post-operative period   Do not do any neck exercises until you have been instructed by your doctor.  When you are in the bed, you may lay on your back or on either side. Do not lie on your stomach.  Continue using your incentive spirometer regularly for deep breathing exercises   You may resume sexual relations 3-4 weeks after your surgery, depending on how you are feeling. Diet   You may resume your normal diet. If your throat is sore, you may want to eat soft foods for a few days. Be sure to drink plenty of fluids, it is important to keep yourself hydrated. If you begin having trouble swallowing, call the office immediately.  Avoid alcoholic beverages and ABSOLUTELY NO tobacco products. Tobacco products will interfere with your healing. If you continue to use tobacco, you may end up needing another surgery in the future. Medications   Do not take anti-inflammatory medications or aspirin unless instructed by your physician.  Take your pain medication as directed.  Do NOT take additional Tylenol if your prescribed pain medication has acetaminophen in it (Endocet/Percocet, Lortab, Norco).  It is important to have regular bowel movements. Pain medications may cause constipation.   Stool softeners, prune juice, and increasing your water and fiber intake may help in preventing constipation.  Do NOT take laxatives if at all possible except in severe situations. It can results in a vicious cycle of constipation and diarrhea.  Do not be alarmed if you still have some of the same symptoms you had prior to surgery. The nerves often require time to heal after the pressure has been relieved. You may experience pain in your shoulders or between your shoulder blades, which is common after this surgery. The level of pain you experience should improve as your body heals. Driving   You may not drive or return to work until instructed by your physician. However, you may ride in the car for short periods of time. Neck collar   Wear your neck brace. You may remove it for short breaks, when eating or showering. You must keep the brace on while sleeping and when ambulating. Showering   You may shower in approximately 5 days after your surgery if your incision is not draining.  You may remove your brace during showers.  Do not rub or apply lotion or ointments to the incision site.  Do not use tub baths, swimming pools or Jacuzzis. Caring for your incision   Keep the clear, plastic dressing on until 3 days after surgery. At that point, if the incision is dry and without drainage, you may keep the wound open to air without cover.  You may have steri-strips on your incision (small, white pieces of tape). Do not pull the steri-strips; they will fall off on their own after several days. If you have sutures or staples, they will be removed by home health or when you see your physician.  Do not rub or apply any lotions or ointments to your incision site. Follow Up   Once you are home, call your physicians office to schedule an appointment 2-3 weeks after surgery. Notify your physician if you develop any of the following conditions:   Fever above 101 degrees for 24 hours.    Nausea or vomiting.  Severe headache.  Inability to urinate.  Loss of bowel or bladder control (sudden onset of incontinence).  Changes in sensation in your extremities (numbness, tingling, loss of color).  Severe pain or pain not relieved by medications.  Redness, swelling, or drainage from your incision.  Persistent pain in the chest.    Pain in the calf of either leg.  Increased weakness (if this is greater than before your surgery). If you have any questions, contact your Orthopaedic Surgeons office. OFFICE OF DR. Lindsay Plasencia   [phone]  [place] [address], [address], [address] [facility], [address] [facility] [address]     * WEAR YOUR BRACE AS ADVISED    * NO DRIVING UNTIL YOU ARE CLEARED TO DO SO BY YOUR SURGEON    * LIMIT LIFTING, BENDING AND TWISTING.  NO LIFTING MORE THAN 5 LBS    * MAKE SURE YOU ARE GETTING GOOD NUTRITION (Lean Protein, Vitamin D AND Calcium)    * DO NOT TAKE ANY NSAIDs FOR THE FIRST 3 MONTHS AFTER SURGERY (such as Advil/Ibuprofen/Motrin, Aleve/Naproxen/Naprosyn, Diclofenac, Celebrex, Meloxicam, Indomethacin, Goody's powder, BC powder etc.)    * NO NICOTINE PRODUCTS    * FULLY READ YOUR DISCHARGE INSTRUCTIONS

## 2019-05-08 NOTE — PROGRESS NOTES
ORTHOPAEDIC CERVICAL FUSION PROGRESS NOTE    NAME:     Nigel Sam   :       1970   MRN:       [id number]   DATE:      2019    POD:              1 Day Post-Op  S/P:              Procedure(s):  C5-C6  ANTERIOR CERVICAL FUSION WITH INSTRUMENTATION    SUBJECTIVE:  C/O sore throat   No arm pain or numbness  Denies nausea/vomiting, headache, chest pain or shortness of breath  Pain controlled    Recent Labs     19  0704   HGB 11.0*      K 3.9      CO2 28   BUN 13   CREA 0.76   *     Patient Vitals for the past 12 hrs:   BP Temp Pulse Resp SpO2   19 0804 122/68 98.3 °F (36.8 °C) 62 14 98 %   19 0331 115/65 98.4 °F (36.9 °C) 75 14 98 %   19 2312 116/69 98.2 °F (36.8 °C) 75 14 96 %       Exam:  Soft collar inplace / intact  Dressings clean and dry  Positive strength/ROM bilat upper ext.   Neuro intact to sensation  BL UEs NVID    PLAN:  Continue PO pain medications as needed  Chloraseptic spray at bedside  Advance diet as tolerated  Out of bed w/ assist  Likely D/C to home today      [place], [place]  Orthopaedic Surgery  Physician Assistant to Dr. Violeta Croft

## 2019-05-08 NOTE — PROGRESS NOTES
Patient discharged to home at this time, accompanied by her father, she is alert and oriented x 4, able to make needs known. Denies pain or discomfort when asked. resp even unlabored. I have reviewed discharge instructions with the patient. The patient verbalized understanding s/s to report, keeping follow up appointment and restrictions.

## 2019-05-18 NOTE — DISCHARGE SUMMARY
DISCHARGE SUMMARY     Patient: Power Smith                             Medical Record Number: [id number]                : 1970  Age: 51 y.o. Admit Date: 2019  Discharge Date: 2019  Admission Diagnosis: Cervical stenosis of spinal canal [M48.02]  Discharge Diagnosis: CERVICAL DISC HERNIATION, CERVIAL RADICULITIS,   Procedures: Procedure(s):  C5-C6  ANTERIOR CERVICAL FUSION WITH INSTRUMENTATION  Surgeon: Tino Gonzalez MD  Anesthesia: General  Complications: None     History of Present Illness:  Power Smith is a 51 y.o. female with a history of intractable neck pain and radiculopathy. Despite conservative management and after clinical and radiographic evaluation, it was determined that she suffered from cervical stenosis and would benefit from Procedure(s):  C5-C6  ANTERIOR CERVICAL FUSION WITH INSTRUMENTATION, which she consented to undergo after a discussion of the risks, benefits, alternatives, rehab concerns, and potential complications of surgery. Hospital Course:  Power Smith tolerated the procedure well. She was transferred  to the recovery room in stable condition. After a brief stay, the patient was then transferred to the Orthopedic floor. On postoperative day #1, the dressing was clean and dry and she was neurovascularly intact. The patient was afebrile and vital signs were stable. Power Smith was deemed able to be discharged to Home  in stable condition on postoperative day 1. She was provided with routine postoperative instructions and advised to follow up in my office in 3 weeks following discharge from the hospital.  She was given a brace and prescriptions for medication to control post-operative symptoms.     Discharge Medications:  Discharge Medication List as of 2019 10:48 AM      START taking these medications    Details   oxyCODONE IR (ROXICODONE) 5 mg immediate release tablet Take 1-2 Tabs by mouth every four (4) hours as needed (Take every 4-6 hours as needed for surgical pain.) for up to 3 days. Max Daily Amount: 60 mg., Print, Disp-56 Tab, R-0      senna-docusate (PERICOLACE) 8.6-50 mg per tablet Take 1 Tab by mouth two (2) times a day for 30 days. , Print, Disp-60 Tab, R-0         CONTINUE these medications which have NOT CHANGED    Details   gabapentin 600 mg Tb24 Take 1,800 mg by mouth daily (after lunch). , Historical Med      cholecalciferol, vitamin D3, (VITAMIN D3) 2,000 unit tab Take 1 Tab by mouth every morning., Historical Med      loratadine (CLARITIN) 10 mg tablet Take 10 mg by mouth daily as needed., Historical Med      baclofen (LIORESAL) 10 mg tablet Take 1 Tab by mouth three (3) times daily. , Historical Med      OTHER Massage therapy, Print, Disp-1 Units, R-5      calcium carbonate/vitamin D3 (CALCIUM+D PO) Take 1 Tab by mouth every morning., Historical Med      acetaminophen (TYLENOL EXTRA STRENGTH) 500 mg tablet Take 1,000 mg by mouth two (2) times a day., Historical Med      methocarbamol (ROBAXIN) 750 mg tablet Take 750 mg by mouth as needed., Historical Med         STOP taking these medications       gabapentin (NEURONTIN) 600 mg tablet Comments:   Reason for Stopping:         meloxicam (MOBIC) 15 mg tablet Comments:   Reason for Stopping:               KAROLYN Mcconnell  5/8/2019  Orthopaedic Surgery  Physician Assistant to Dr. Lorna Vasquez

## 2019-06-25 ENCOUNTER — TELEPHONE (OUTPATIENT)
Dept: NEUROLOGY | Age: 49
End: 2019-06-25

## 2019-06-25 ENCOUNTER — OFFICE VISIT (OUTPATIENT)
Dept: NEUROLOGY | Age: 49
End: 2019-06-25

## 2019-06-25 VITALS
HEIGHT: 63 IN | DIASTOLIC BLOOD PRESSURE: 86 MMHG | BODY MASS INDEX: 33.31 KG/M2 | WEIGHT: 188 LBS | OXYGEN SATURATION: 99 % | HEART RATE: 73 BPM | SYSTOLIC BLOOD PRESSURE: 122 MMHG

## 2019-06-25 DIAGNOSIS — G43.919 INTRACTABLE MIGRAINE WITHOUT STATUS MIGRAINOSUS, UNSPECIFIED MIGRAINE TYPE: Primary | ICD-10-CM

## 2019-06-25 DIAGNOSIS — G43.919 INTRACTABLE MIGRAINE WITHOUT STATUS MIGRAINOSUS, UNSPECIFIED MIGRAINE TYPE: ICD-10-CM

## 2019-06-25 NOTE — TELEPHONE ENCOUNTER
----- Message from [employer] sent at 6/25/2019  1:06 PM EDT -----  Regarding: DANIEL Lezama / Telephone  Contact: [phone]  Pt stated [facility], the pharmacy on file, requested the Rx for Aimovig injection called in to [phone].

## 2019-06-25 NOTE — PROGRESS NOTES
Had cervical fusion done may 7th. Headaches are a little better. She cannot give it a number per month. Severe pain in right shoulder this past Saturday. Tingling in the right leg.
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
## 2019-06-25 NOTE — TELEPHONE ENCOUNTER
----- Message from [facility] sent at 6/25/2019 12:39 PM EDT -----  Regarding: DANIEL Martinez/Celina  Pt [phone] says that the hire date needed for the forms is 02/25/2005

## 2019-07-30 ENCOUNTER — OFFICE VISIT (OUTPATIENT)
Dept: NEUROLOGY | Age: 49
End: 2019-07-30

## 2019-07-30 VITALS
SYSTOLIC BLOOD PRESSURE: 124 MMHG | HEIGHT: 63 IN | OXYGEN SATURATION: 98 % | DIASTOLIC BLOOD PRESSURE: 90 MMHG | BODY MASS INDEX: 33.31 KG/M2 | HEART RATE: 64 BPM | WEIGHT: 188 LBS

## 2019-07-30 DIAGNOSIS — G43.909 MIGRAINE WITHOUT STATUS MIGRAINOSUS, NOT INTRACTABLE, UNSPECIFIED MIGRAINE TYPE: Primary | ICD-10-CM
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
## 2019-08-07 ENCOUNTER — TELEPHONE (OUTPATIENT)
Dept: NEUROLOGY | Age: 49
End: 2019-08-07

## 2019-08-14 NOTE — TELEPHONE ENCOUNTER
PA APPROVED for Aimovig 70mg by Suraj WOODSON. Effective dates 08/14/19 - 08/13/20. Case #[id number] Approval will be scanned into media for review. Please send Rx to patient's preferred pharmacy (if necessary).

## 2019-09-06 ENCOUNTER — TELEPHONE (OUTPATIENT)
Dept: NEUROLOGY | Age: 49
End: 2019-09-06

## 2019-09-06 NOTE — TELEPHONE ENCOUNTER
----- Message from Karel Joseph sent at 9/6/2019  3:19 PM EDT -----  Regarding: DANIEL Lezama/ Telephone  Patient stated that her vision is blurry sometimes and wanted to know if she should see the eye dr loza in the office.  Contact is [phone]

## 2019-10-29 ENCOUNTER — OFFICE VISIT (OUTPATIENT)
Dept: NEUROLOGY | Age: 49
End: 2019-10-29

## 2019-10-29 ENCOUNTER — TELEPHONE (OUTPATIENT)
Dept: NEUROLOGY | Age: 49
End: 2019-10-29

## 2019-10-29 VITALS
OXYGEN SATURATION: 99 % | DIASTOLIC BLOOD PRESSURE: 74 MMHG | WEIGHT: 188 LBS | SYSTOLIC BLOOD PRESSURE: 132 MMHG | HEART RATE: 69 BPM | HEIGHT: 63 IN | BODY MASS INDEX: 33.31 KG/M2

## 2019-10-29 DIAGNOSIS — G43.919 INTRACTABLE MIGRAINE WITHOUT STATUS MIGRAINOSUS, UNSPECIFIED MIGRAINE TYPE: ICD-10-CM

## 2019-10-29 DIAGNOSIS — R41.3 MEMORY LOSS: ICD-10-CM

## 2019-10-29 NOTE — PROGRESS NOTES
Still has to limit her bending. Still has soreness around her neck. Right shoulder still bothering her. Sometimes has blurred vision in eye. She did go to the eye dr. He suggested to her that maybe she had a concussion.
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
## 2019-10-29 NOTE — TELEPHONE ENCOUNTER
Prior Auth APPROVED for Botox Injection & Med by [employer] via phone call with intake nurse Alex Willoughby. Effective Dates 10/29/19 - 04/27/20 (2 visits). Case #[id number]. Approval will be scanned into media for review. Please send Botox Rx to [facility] if necessary. Also submitted PA for Botox Med to [employer] via [employer]. PA was APPROVED. Effective dates 10/29/19 - 04/27/20. Case #[id number]. Still send Botox Rx to [facility], but if [facility] states it should go through [employer], then we'll have the approval on file already.     [facility]:  [id number]

## 2019-11-08 ENCOUNTER — TELEPHONE (OUTPATIENT)
Dept: NEUROLOGY | Age: 49
End: 2019-11-08

## 2019-11-08 NOTE — TELEPHONE ENCOUNTER
botox we make the appt right? MRI testing?  I think she means neuropsych   And that is for memory so diann

## 2019-11-08 NOTE — TELEPHONE ENCOUNTER
----- Message from Jeovanny Joy sent at 11/8/2019  8:43 AM EST -----  Regarding: DANIEL Mejia/Telephone  General Message/Vendor Calls    Caller's first and last name: Haresh Powers(pt)      Reason for call: Practice will be receiving \"botox\" medication today. Pt would like to know if she has to make appt or would DANIEL Mejia make appt. Also stated there was talks about more MRI testing. Want to know status; whether or not testing is still needed. Callback required yes/no and why: yes      Best contact number(s):[phone]; detailed message if no answer.       Details to clarify the request:      Jeovanny Joy

## 2019-11-11 ENCOUNTER — TELEPHONE (OUTPATIENT)
Dept: NEUROLOGY | Age: 49
End: 2019-11-11

## 2019-11-13 ENCOUNTER — TELEPHONE (OUTPATIENT)
Dept: NEUROLOGY | Age: 49
End: 2019-11-13

## 2019-11-13 NOTE — TELEPHONE ENCOUNTER
----- Message from Viktoria Rodriguez sent at 11/13/2019  8:40 AM EST -----  Regarding: DANIEL Lezama/Telephone  Pt would like a call back regarding getting her botox injection.  Contact is [phone]

## 2019-11-14 ENCOUNTER — TELEPHONE (OUTPATIENT)
Dept: NEUROLOGY | Age: 49
End: 2019-11-14

## 2019-11-14 NOTE — TELEPHONE ENCOUNTER
Received patient's Botox today from [facility].   [phone]  RX # [id number]  With 5 refills remaining

## 2019-11-19 ENCOUNTER — OFFICE VISIT (OUTPATIENT)
Dept: NEUROLOGY | Age: 49
End: 2019-11-19

## 2019-11-19 VITALS
HEIGHT: 63 IN | DIASTOLIC BLOOD PRESSURE: 82 MMHG | SYSTOLIC BLOOD PRESSURE: 128 MMHG | BODY MASS INDEX: 33.31 KG/M2 | WEIGHT: 188 LBS

## 2019-11-19 NOTE — PROGRESS NOTES
[facility]  OFFICE PROCEDURE PROGRESS NOTE        Chart reviewed for the following:   I, [de-identified] M DANIEL Mejia, have reviewed the History, Physical and updated the Allergic reactions for 19 [facility] performed immediately prior to start of procedure:   ISushant NP, have performed the following reviews on [place] prior to the start of the procedure:            * Patient was identified by name and date of birth   * Agreement on procedure being performed was verified  * Risks and Benefits explained to the patient  * Procedure site verified and marked as necessary  * Patient was positioned for comfort  * Consent was signed and verified     Time: 0930      Date of procedure: 11/19/2019    Procedure performed by:  Sushant Carrizales NP    Provider assisted by: None    Patient assisted by: None    How tolerated by patient: tolerated the procedure well with no complications    Post Procedural Pain Scale: 2 - Hurts Little Bit    Comments: None    Botox Injection Note       Indication: patient has chronic recurrent migraine, has greater than 15 migraine headaches per month, has failed two or more categories of preventatives    Procedure:   Botox concentration: 200 units in 4 ml of preservative-free normal saline. 31 sites injections, distribution as follow      Units/site  Sites Sides Subtotal    Procerus 5 1 1 5    5 1 2 10   Frontalis 5 2 2 20   Temporalis 5 4 2 40   Occipitalis 5 3 2 30   Upper cervical paraspinalis 5 2 2 20   Trapezius 5 3 2 30         200 units Botox were reconstituted, 155 units injected as above and the remainder was unavoidably wasted.      Patient tolerated procedure well.     _____________________________   Patrizia Loving NP

## 2019-12-11 ENCOUNTER — OFFICE VISIT (OUTPATIENT)
Dept: NEUROLOGY | Age: 49
End: 2019-12-11

## 2019-12-11 DIAGNOSIS — M54.17 LUMBOSACRAL RADICULOPATHY: ICD-10-CM

## 2019-12-11 DIAGNOSIS — R51.9 INTRACTABLE HEADACHE, UNSPECIFIED CHRONICITY PATTERN, UNSPECIFIED HEADACHE TYPE: ICD-10-CM

## 2019-12-11 DIAGNOSIS — G31.84 MILD COGNITIVE IMPAIRMENT: Primary | ICD-10-CM

## 2019-12-11 DIAGNOSIS — G43.919 INTRACTABLE MIGRAINE WITHOUT STATUS MIGRAINOSUS, UNSPECIFIED MIGRAINE TYPE: ICD-10-CM

## 2019-12-11 DIAGNOSIS — R20.0 NUMBNESS AND TINGLING OF BOTH LEGS: ICD-10-CM

## 2019-12-11 DIAGNOSIS — R20.2 NUMBNESS AND TINGLING OF BOTH LEGS: ICD-10-CM

## 2019-12-11 DIAGNOSIS — R41.9 DEFICIT IN COMPREHENSION: ICD-10-CM

## 2019-12-11 DIAGNOSIS — R47.89 WORD FINDING DIFFICULTY: ICD-10-CM

## 2019-12-11 DIAGNOSIS — R41.3 SHORT-TERM MEMORY LOSS: ICD-10-CM

## 2019-12-11 DIAGNOSIS — M54.2 NECK PAIN: ICD-10-CM

## 2019-12-11 DIAGNOSIS — F07.81 POST CONCUSSION SYNDROME: ICD-10-CM

## 2019-12-11 DIAGNOSIS — R20.2 NUMBNESS AND TINGLING IN BOTH HANDS: ICD-10-CM

## 2019-12-11 DIAGNOSIS — G43.809 CERVICOGENIC MIGRAINE: ICD-10-CM

## 2019-12-11 DIAGNOSIS — R20.0 BILATERAL HAND NUMBNESS: ICD-10-CM

## 2019-12-11 DIAGNOSIS — R20.0 NUMBNESS AND TINGLING IN BOTH HANDS: ICD-10-CM
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
## 2019-12-18 ENCOUNTER — OFFICE VISIT (OUTPATIENT)
Dept: NEUROLOGY | Age: 49
End: 2019-12-18

## 2019-12-18 DIAGNOSIS — F45.0 DEPRESSION WITH SOMATIZATION: ICD-10-CM

## 2019-12-18 DIAGNOSIS — F41.8 ANXIETY WITH SOMATIC FEATURES: ICD-10-CM

## 2019-12-18 DIAGNOSIS — R47.89 WORD FINDING DIFFICULTY: ICD-10-CM

## 2019-12-18 DIAGNOSIS — R41.3 MEMORY LOSS: ICD-10-CM

## 2019-12-18 DIAGNOSIS — F07.81 POST CONCUSSION SYNDROME: Primary | ICD-10-CM

## 2019-12-18 DIAGNOSIS — F45.42 PAIN DISORDER ASSOCIATED WITH PSYCHOLOGICAL AND PHYSICAL FACTORS: ICD-10-CM

## 2019-12-18 DIAGNOSIS — Z87.820 HISTORY OF CONCUSSION: ICD-10-CM

## 2019-12-18 DIAGNOSIS — R41.89 DIFFICULTY PROCESSING INFORMATION: ICD-10-CM

## 2019-12-18 DIAGNOSIS — R41.9 DEFICIT IN COMPREHENSION: ICD-10-CM

## 2019-12-18 DIAGNOSIS — F32.A DEPRESSION WITH SOMATIZATION: ICD-10-CM

## 2019-12-18 DIAGNOSIS — R41.840 ATTENTION DEFICIT: ICD-10-CM
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
## 2020-01-03 ENCOUNTER — OFFICE VISIT (OUTPATIENT)
Dept: NEUROLOGY | Age: 50
End: 2020-01-03

## 2020-01-03 VITALS
OXYGEN SATURATION: 98 % | DIASTOLIC BLOOD PRESSURE: 62 MMHG | HEIGHT: 63 IN | SYSTOLIC BLOOD PRESSURE: 130 MMHG | WEIGHT: 178.7 LBS | BODY MASS INDEX: 31.66 KG/M2 | RESPIRATION RATE: 18 BRPM | HEART RATE: 82 BPM | TEMPERATURE: 98.4 F

## 2020-01-03 DIAGNOSIS — F32.A ANXIETY AND DEPRESSION: ICD-10-CM

## 2020-01-03 DIAGNOSIS — F41.9 ANXIETY AND DEPRESSION: ICD-10-CM

## 2020-01-03 DIAGNOSIS — R20.0 RIGHT LEG NUMBNESS: ICD-10-CM

## 2020-01-03 DIAGNOSIS — R29.898 RIGHT LEG WEAKNESS: Primary | ICD-10-CM

## 2020-01-03 DIAGNOSIS — R41.89 COGNITIVE DEFICITS: ICD-10-CM

## 2020-01-03 NOTE — PROGRESS NOTES
Armando Vaca is a 47 y.o. female who presents with the following  Chief Complaint   Patient presents with    Follow-up     botox    Results     dr Aníbal Thomas       HPI    Patient comes in for a follow up for Botox, neuropsych results. She is still having migraines but not as frequent. She is noticing they are cut down by a few a month so far. She does feel like they are still coming from her neck surgical site and right shoulder. She will get back in with Dr. Hipolito Zepeda to look at injections as she has been checked off post ortho surgery. She is having right leg pain, weakness, and numbness still. Her EMG in right earlier part of 2018 was normal.   Has not had a MRI lumbar spine but has had some PT before with no real changes. 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 No Known Allergies    Current Outpatient Medications   Medication Sig    onabotulinumtoxinA (BOTOX) 200 unit injection Inject 155 units IM into 31 FDA approved sites head/neck every 3 months    onabotulinumtoxinA (BOTOX) 200 unit injection Inject 155 units IM into head, neck, face every 3 months  for chronic migraine    erenumab-aooe (AIMOVIG AUTOINJECTOR) 70 mg/mL injection 1 mL by SubCUTAneous route every thirty (30) days.  gabapentin 600 mg Tb24 Take 1,800 mg by mouth daily (after lunch).  cholecalciferol, vitamin D3, (VITAMIN D3) 2,000 unit tab Take 1 Tab by mouth every morning.  loratadine (CLARITIN) 10 mg tablet Take 10 mg by mouth daily as needed.  baclofen (LIORESAL) 10 mg tablet Take 1 Tab by mouth three (3) times daily.  calcium carbonate/vitamin D3 (CALCIUM+D PO) Take 1 Tab by mouth every morning.  acetaminophen (TYLENOL EXTRA STRENGTH) 500 mg tablet Take 1,000 mg by mouth two (2) times a day.  methocarbamol (ROBAXIN) 750 mg tablet Take 750 mg by mouth as needed. No current facility-administered medications for this visit.         Social History     Tobacco Use   Smoking Status Never Smoker   Smokeless Tobacco Never Used       Past Medical History:   Diagnosis Date    Arthritis     neck    Depression     Lobular carcinoma in situ of breast 2012    right    Migraines 2018       Past Surgical History:   Procedure Laterality Date    HX BREAST LUMPECTOMY Right 2012    HX CERVICAL FUSION  2019    HX GYN          HX TONSILLECTOMY  1980       Family History   Problem Relation Age of Onset    Cancer Mother         Pancreatic    Breast Cancer Sister 40    Diabetes Father     Pacemaker Brother     No Known Problems Brother        Social History     Socioeconomic History    Marital status: SINGLE     Spouse name: Not on file    Number of children: Not on file    Years of education: Not on file    Highest education level: Not on file   Tobacco Use    Smoking status: Never Smoker    Smokeless tobacco: Never Used   Substance and Sexual Activity    Alcohol use: No    Drug use: No    Sexual activity: Yes     Partners: Male       Review of Systems   Constitutional: Positive for malaise/fatigue. Eyes: Positive for blurred vision and photophobia. Negative for double vision. Gastrointestinal: Negative for nausea and vomiting. Musculoskeletal: Positive for back pain and neck pain. Neurological: Positive for dizziness, tingling, sensory change, weakness and headaches. Negative for tremors, seizures and loss of consciousness. Psychiatric/Behavioral: Positive for memory loss. Negative for depression, hallucinations, substance abuse and suicidal ideas. The patient is nervous/anxious. Remainder of comprehensive review is negative. Physical Exam :    Visit Vitals  /62 (BP 1 Location: Right arm, BP Patient Position: Sitting)   Pulse 82   Temp 98.4 °F (36.9 °C)   Resp 18   Ht 5' 3\" (1.6 m)   Wt 81.1 kg (178 lb 11.2 oz)   SpO2 98%   BMI 31.66 kg/m²          Impressions & Recommendations:   This patient generated a mixed normal/abnormal range Neuropsychological Evaluation with respect to neurocognitive functioning. In this regard, impairments are noted for verbal fluency, confrontation naming, sustained attention, visual organization, visual memory, working memory, processing speed, perceptual reasoning, verbal comprehension, and bilateral fine motor dexterity. Normal range performances are noted for auditory learning, auditory memory, bilateral motor speed, and executive functioning. From an emotional standpoint, the patient denied clinically significant psychopathology but was quite defensive in her response style on personality testing. Despite this, there are physical/somatic signs of depression and anxiety.                   This is a highly unusual combination of normal versus abnormal neurocognitive test scores. The scores themselves do not quite fit with post concussion type issues, especially given the mechanism of injury. It is very difficult, for example, to lower someone's IQ scores, and her variable memory scores from fully normal auditory memory to markedly impaired visual memory is also unusual.  There is an organic quality of change here but her IQ scores themselves, if accurate, would suggest a person who would have marked difficulties completing job requirements as a LPN. Moodwise, she is defensive in her response style but somatic symptoms of depression and anxiety are also present. This, too, would not be the sole explanation for the scores she generated. She denied previous history of academic difficulties. I suggest psychiatric treatment for depression and anxiety, and consider treatment for attention issues as well. Active engagement in psychotherapy is also advised. Consult with Speech and Neurocognitive Rehab. I see no evidence of malingering here, and the issues are likely multifactorial - organic and psychiatric. I would like to see her again once mood and attention issues are treated to better clarify.   She does need reminders, frequent breaks, and tasks assigned slowly and one at a time. I am not concerned about competency/capacity. Again, this is an unusual profile and thus my recommendations are limited. Follow up in six months to better clarify. Baseline now established. Clinical correlation is strongly advised. Results for orders placed or performed during the hospital encounter of 58/42/07   METABOLIC PANEL, BASIC   Result Value Ref Range    Sodium 142 136 - 145 mmol/L    Potassium 3.9 3.5 - 5.1 mmol/L    Chloride 107 97 - 108 mmol/L    CO2 28 21 - 32 mmol/L    Anion gap 7 5 - 15 mmol/L    Glucose 101 (H) 65 - 100 mg/dL    BUN 13 6 - 20 MG/DL    Creatinine 0.76 0.55 - 1.02 MG/DL    BUN/Creatinine ratio 17 12 - 20      GFR est AA >60 >60 ml/min/1.73m2    GFR est non-AA >60 >60 ml/min/1.73m2    Calcium 8.4 (L) 8.5 - 10.1 MG/DL   HEMOGLOBIN   Result Value Ref Range    HGB 11.0 (L) 11.5 - 16.0 g/dL   HCG URINE, QL. - POC   Result Value Ref Range    Pregnancy test,urine (POC) NEGATIVE  NEG         Orders Placed This Encounter    MRI LUMB SPINE W WO CONT     Standing Status:   Future     Standing Expiration Date:   2/3/2021     Order Specific Question:   Is Patient Pregnant? Answer:   No     Order Specific Question:   Reason for Exam     Answer:   right leg weakness, numbness, tingling, pain     Order Specific Question:   STAT Creatinine as indicated     Answer: Yes    REFERRAL TO PSYCHIATRY     Referral Priority:   Routine     Referral Type:   Behavioral Health     Referral Reason:   Specialty Services Required     Referred to Provider:   Jia Whitmore MD     Number of Visits Requested:   1    REFERRAL TO NEUROLOGY     Referral Priority:   Routine     Referral Type:   Consultation     Referral Reason:   Specialty Services Required     Referred to Provider:   Jesus Dalal MD     Number of Visits Requested:   1       1. Right leg weakness    2. Right leg numbness    3. Cognitive deficits    4. Anxiety and depression        Neuropsych seen above and discussed in full with patient. Refer to neurocognitive rehab to help with symptom management. Also refer to psychiatry to help with management. Discussed Botox and she will continue as trials use 2 doses to evaluate positive changes. Will get an MRI lumbar spine to look at stenosis, radiculopathy as EMG was normal and PT failed previously.              This note will not be viewable in [facility]

## 2020-01-03 NOTE — PROGRESS NOTES
Patient states her right leg is still having some numbness and tingling this happens everyday with little pain and discomfort. She is she still having pain in her right shoulder, she is also still having headaches everyday rating at a 5-10 , she is feeling some tightness in her head. Her neck is also having some discomfort everyday.

## 2020-01-10 ENCOUNTER — HOSPITAL ENCOUNTER (OUTPATIENT)
Dept: MRI IMAGING | Age: 50
Discharge: HOME | End: 2020-01-10
Attending: NURSE PRACTITIONER
Payer: COMMERCIAL

## 2020-01-10 DIAGNOSIS — R20.0 RIGHT LEG NUMBNESS: ICD-10-CM

## 2020-01-10 DIAGNOSIS — R29.898 RIGHT LEG WEAKNESS: ICD-10-CM

## 2020-01-10 PROCEDURE — 72148 MRI LUMBAR SPINE W/O DYE: CPT

## 2020-02-18 ENCOUNTER — DOCUMENTATION ONLY (OUTPATIENT)
Dept: NEUROLOGY | Age: 50
End: 2020-02-18

## 2020-02-18 ENCOUNTER — OFFICE VISIT (OUTPATIENT)
Dept: NEUROLOGY | Age: 50
End: 2020-02-18

## 2020-02-18 DIAGNOSIS — G43.919 INTRACTABLE MIGRAINE WITHOUT STATUS MIGRAINOSUS, UNSPECIFIED MIGRAINE TYPE: Primary | ICD-10-CM

## 2020-02-18 DIAGNOSIS — M54.2 NECK PAIN: ICD-10-CM

## 2020-02-18 DIAGNOSIS — H53.9 VISUAL CHANGES: ICD-10-CM

## 2020-02-18 NOTE — PROGRESS NOTES
Pt will need a PA on her botox med/ botox injection upcoming PA expires on 4/27/20 next appt is scheduled for 5/19/20.

## 2020-02-18 NOTE — PROGRESS NOTES
[facility]  OFFICE PROCEDURE PROGRESS NOTE        Chart reviewed for the following:   Tommy LO NP, have reviewed the History, Physical and updated the Allergic reactions for [facility] performed immediately prior to start of procedure:   Dmitri LO NP, have performed the following reviews on Milena Clubs prior to the start of the procedure:            * Patient was identified by name and date of birth   * Agreement on procedure being performed was verified  * Risks and Benefits explained to the patient  * Procedure site verified and marked as necessary  * Patient was positioned for comfort  * Consent was signed and verified     Time: 0930       Date of procedure: 2/18/2020    Procedure performed by:  Dmitri Subramanian NP    Provider assisted by: None    Patient assisted by: None    How tolerated by patient: tolerated the procedure well with no complications    Post Procedural Pain Scale: 2 - Hurts Little Bit    Comments: None      Botox Injection Note       Indication: patient has chronic recurrent migraine, has 7-10 less migraine days per month with botox injections    Procedure:   Botox concentration: 200 units in 4 ml of preservative-free normal saline. 31 sites injections, distribution as follow      Units/site  Sites Sides Subtotal    Procerus 5 1 1 5    5 1 2 10   Frontalis 5 2 2 20   Temporalis 5 4 2 40   Occipitalis 5 3 2 30   Upper cervical paraspinalis 5 2 2 20   Trapezius 5 3 2 30         200 units Botox were reconstituted, 155 units injected as above and the remainder was unavoidably wasted. Patient tolerated procedure well.     _____________________________   Lon Hayden NP       Initiate  Ubrelvy 50 mg tablets for PRN acute rescue of migraine. Failed Fioricet, Imitrex, Maxalt. MRI brain to evaluate for changes as migraines still persist and she feels changed.    Also refer to PT for neck pain

## 2020-02-28 ENCOUNTER — HOSPITAL ENCOUNTER (OUTPATIENT)
Dept: MRI IMAGING | Age: 50
Discharge: HOME | End: 2020-02-28
Attending: NURSE PRACTITIONER
Payer: COMMERCIAL

## 2020-02-28 DIAGNOSIS — G43.919 INTRACTABLE MIGRAINE WITHOUT STATUS MIGRAINOSUS, UNSPECIFIED MIGRAINE TYPE: ICD-10-CM

## 2020-02-28 DIAGNOSIS — H53.9 VISUAL CHANGES: ICD-10-CM

## 2020-02-28 PROCEDURE — 70551 MRI BRAIN STEM W/O DYE: CPT

## 2020-03-31 ENCOUNTER — OFFICE VISIT (OUTPATIENT)
Dept: NEUROLOGY | Age: 50
End: 2020-03-31

## 2020-03-31 VITALS
SYSTOLIC BLOOD PRESSURE: 128 MMHG | HEART RATE: 69 BPM | OXYGEN SATURATION: 99 % | TEMPERATURE: 97.5 F | HEIGHT: 63 IN | BODY MASS INDEX: 31.66 KG/M2 | DIASTOLIC BLOOD PRESSURE: 70 MMHG

## 2020-03-31 DIAGNOSIS — G43.919 INTRACTABLE MIGRAINE WITHOUT STATUS MIGRAINOSUS, UNSPECIFIED MIGRAINE TYPE: Primary | ICD-10-CM

## 2020-03-31 RX ORDER — GABAPENTIN 800 MG/1
TABLET ORAL
COMMUNITY
Start: 2020-03-09 | End: 2020-04-07

## 2020-03-31 NOTE — PROGRESS NOTES
Pt is here today for a f/u. Pt states she thinks her botox has help. Pt is still having problems with right shoulder. Pt is still in PT for her head and neck.

## 2020-04-07 ENCOUNTER — TELEPHONE (OUTPATIENT)
Dept: NEUROLOGY | Age: 50
End: 2020-04-07

## 2020-04-07 DIAGNOSIS — G62.9 NEUROPATHY: Primary | ICD-10-CM

## 2020-04-07 RX ORDER — GABAPENTIN 800 MG/1
TABLET ORAL
Qty: 90 TAB | Refills: 5 | Status: SHIPPED | OUTPATIENT
Start: 2020-04-07 | End: 2020-10-20

## 2020-04-07 NOTE — TELEPHONE ENCOUNTER
----- Message from Lei Congregation Page sent at 2020  1:29 PM EDT -----  Regarding: DANIEL GUEVARA;julita/ Bianca  General/ Vendor Message  To:   Subject:   Patient:      :  1970     ID numbers:   [id number]    Caller's first and last name: Patient  Reason for call: Prior Authorization  Best contact number(s): [phone]  Details to clarify the request: The medication \"Ubrelvy\" requires a pa.  Please contact [facility] [address] [place] [phone] pharmacy to initiate

## 2020-04-07 NOTE — TELEPHONE ENCOUNTER
Prior Authorization APPROVED for Ramone Gale by Curt Gonzalez via Cover My Meds. Effective dates 04/07/20 - 04/07/21, Case #[id number]. Approval will be scanned into media.  Pharmacy has been notified of approval.     JONNIE Rush: Lina Mccann

## 2020-05-12 ENCOUNTER — TELEPHONE (OUTPATIENT)
Dept: NEUROLOGY | Age: 50
End: 2020-05-12

## 2020-05-19 ENCOUNTER — OFFICE VISIT (OUTPATIENT)
Dept: NEUROLOGY | Age: 50
End: 2020-05-19

## 2020-05-19 VITALS — TEMPERATURE: 98.3 F

## 2020-05-19 DIAGNOSIS — R20.0 RIGHT LEG NUMBNESS: ICD-10-CM

## 2020-05-19 DIAGNOSIS — R20.0 RIGHT LEG NUMBNESS: Primary | ICD-10-CM

## 2020-05-19 RX ORDER — ONABOTULINUMTOXINA 200 [USP'U]/1
INJECTION, POWDER, LYOPHILIZED, FOR SOLUTION INTRADERMAL; INTRAMUSCULAR
Qty: 1 VIAL | Refills: 0 | Status: SHIPPED | COMMUNITY
Start: 2020-05-19 | End: 2020-06-09

## 2020-05-19 NOTE — PROGRESS NOTES
[facility]  OFFICE PROCEDURE PROGRESS NOTE        Chart reviewed for the following:   I, [de-identified] M DANIEL Mejia, have reviewed the History, Physical and updated the Allergic reactions for [address] performed immediately prior to start of procedure:   I, Jeanette Boyle NP, have performed the following reviews on Jakub Levo prior to the start of the procedure:            * Patient was identified by name and date of birth   * Agreement on procedure being performed was verified  * Risks and Benefits explained to the patient  * Procedure site verified and marked as necessary  * Patient was positioned for comfort  * Consent was signed and verified     Time: 1100      Date of procedure: 5/19/2020    Procedure performed by:  Jeanette Boyle NP    Provider assisted by: None    Patient assisted by: None    How tolerated by patient: tolerated the procedure well with no complications    Post Procedural Pain Scale: 2 - Hurts Little Bit    Comments: None      Botox Injection Note       Indication: patient has chronic recurrent migraine, has 7-10 less migraine days per month with botox injections    Procedure:   Botox concentration: 200 units in 4 ml of preservative-free normal saline. 31 sites injections, distribution as follow      Units/site  Sites Sides Subtotal    Procerus 5 1 1 5    5 1 2 10   Frontalis 5 2 2 20   Temporalis 5 4 2 40   Occipitalis 5 3 2 30   Upper cervical paraspinalis 5 2 2 20   Trapezius 5 3 2 30         200 units Botox were reconstituted, 155 units injected as above and the remainder was unavoidably wasted. Patient tolerated procedure well.     _____________________________   Adalberto Majano NP            For the past month having some RIGHT leg numbness from the low back to the hip down the back and side of the right leg. She states this is worse when moving a lot, and worse after working. Will get an EMG to look at sciatica.

## 2020-05-20 ENCOUNTER — OFFICE VISIT (OUTPATIENT)
Dept: NEUROLOGY | Age: 50
End: 2020-05-20

## 2020-05-20 VITALS — TEMPERATURE: 97.9 F

## 2020-05-20 DIAGNOSIS — R20.0 RIGHT LEG NUMBNESS: Primary | ICD-10-CM

## 2020-05-20 NOTE — PROCEDURES
EMG/ NCS Report  [facility] - INPATIENT  [place]  [facility] [address]   Ph: [phone]   FAX: [phone]/ [phone]  Test Date:  2020    Patient: Jasson Turner : 1970 Physician: Canelo Augustin MD   ID#: [id number] SEX: Male Ref. Phys: Zulma Coronado     Patient History / Exam:  Patient complaining of chronic right sided back pain that radiates   the leg. Non-focal exam. Assess for neuropathy vs lumbar radiculopathy. EMG & NCV Findings:  Sensory and motor nerve conduction studies (as indicated in the tables) were within reference of normal.    All F Wave latencies were within normal limits. Patient refused needle EMG testing. Impressions:   Nerve conduction studies were normal. There is no electrodiagnostic evidence of a neuropathy. Unable to assess for lumbar radiculopathy as patient refused the needle EMG part. Recommend neuroimaging correlate if clinically indicated. Thank you for the consult.     Canelo Augustin MD    Nerve Conduction Studies  Anti Sensory Summary Table     Stim Site NR Peak (ms) Norm Peak (ms) P-T Amp (µV) Norm P-T Amp Site1 Site2 Dist (cm)   Right Sup Fibular Anti Sensory (Lat ankle)  27.6°C   Lower leg    2.6 <4.5 7.2 >5 Lower leg Lat ankle 10.0       2.8  27.7       Right Sural Anti Sensory (Lat Mall)  26.8°C   Calf    3.4 <4.5 24.2 >4.0 Calf Lat Mall 14.0     Motor Summary Table     Stim Site NR Onset (ms) Norm Onset (ms) O-P Amp (mV) Norm O-P Amp Amp (Prev) (%) Site1 Site2 Dist (cm) Diogo (m/s) Norm Diogo (m/s)   Right Fibular Motor (Ext Dig Brev)  22.9°C   Ankle    4.2 <6.5 6.3 >2.6 100.0 Ankle Ext Dig Brev 8.0     B Fib    10.1  5.8  92.1 B Fib Ankle 28.0 47 >38   Poplt    11.6  5.6  96.6 Poplt Ankle 38.0 51 >42   Right Tibial Motor (Abd Strickland Brev)  30.6°C   Ankle    4.8 <6.1 8.0 >5.3 100.0 Ankle Abd Strickland Brev 8.0     Knee    12.0  8.3  103.8 Knee Ankle 37.0 51 >39     F Wave Studies     NR F-Lat (ms) Lat Norm (ms) L-R F-Lat (ms) L-R Lat Norm   Right Tibial (Mrkrs) (Abd Hallucis)  28.4°C      53.07 <56  <5.7       Waveforms:

## 2020-05-28 DIAGNOSIS — R29.6 FREQUENT FALLS: ICD-10-CM

## 2020-05-28 DIAGNOSIS — R94.131 ABNORMAL EMG: ICD-10-CM

## 2020-05-28 DIAGNOSIS — R20.0 LEG NUMBNESS: Primary | ICD-10-CM

## 2020-06-02 ENCOUNTER — HOSPITAL ENCOUNTER (OUTPATIENT)
Dept: MRI IMAGING | Age: 50
Discharge: HOME | End: 2020-06-02
Attending: NURSE PRACTITIONER
Payer: COMMERCIAL

## 2020-06-02 DIAGNOSIS — R94.131 ABNORMAL EMG: ICD-10-CM

## 2020-06-02 DIAGNOSIS — R29.6 FREQUENT FALLS: ICD-10-CM

## 2020-06-02 DIAGNOSIS — R20.0 LEG NUMBNESS: ICD-10-CM

## 2020-06-02 PROCEDURE — 72158 MRI LUMBAR SPINE W/O & W/DYE: CPT

## 2020-06-02 PROCEDURE — 74011250636 HC RX REV CODE- 250/636: Performed by: NURSE PRACTITIONER

## 2020-06-02 PROCEDURE — A9575 INJ GADOTERATE MEGLUMI 0.1ML: HCPCS | Performed by: NURSE PRACTITIONER

## 2020-06-02 RX ORDER — GADOTERATE MEGLUMINE 376.9 MG/ML
15 INJECTION INTRAVENOUS
Status: COMPLETED | OUTPATIENT
Start: 2020-06-02 | End: 2020-06-02

## 2020-06-02 RX ADMIN — GADOTERATE MEGLUMINE 15 ML: 376.9 INJECTION INTRAVENOUS at 11:49

## 2020-06-04 DIAGNOSIS — G43.919 INTRACTABLE MIGRAINE WITHOUT STATUS MIGRAINOSUS, UNSPECIFIED MIGRAINE TYPE: ICD-10-CM

## 2020-06-04 RX ORDER — ERENUMAB-AOOE 70 MG/ML
INJECTION SUBCUTANEOUS
Qty: 1 ML | Refills: 5 | Status: SHIPPED | OUTPATIENT
Start: 2020-06-04 | End: 2020-11-17

## 2020-06-09 ENCOUNTER — OFFICE VISIT (OUTPATIENT)
Dept: NEUROLOGY | Age: 50
End: 2020-06-09

## 2020-06-09 VITALS
OXYGEN SATURATION: 98 % | HEART RATE: 105 BPM | SYSTOLIC BLOOD PRESSURE: 118 MMHG | DIASTOLIC BLOOD PRESSURE: 60 MMHG | HEIGHT: 63 IN | WEIGHT: 173 LBS | TEMPERATURE: 98.8 F | BODY MASS INDEX: 30.65 KG/M2

## 2020-06-09 DIAGNOSIS — M54.12 CERVICAL RADICULOPATHY: ICD-10-CM

## 2020-06-09 DIAGNOSIS — M54.16 LUMBAR RADICULOPATHY: Primary | ICD-10-CM

## 2020-06-09 NOTE — PROGRESS NOTES
Fior Sheffield is a 47 y.o. female who presents with the following  Chief Complaint   Patient presents with    Results       HPI    Patient comes in for a follow up for lumbar MRI And EMG results to look at her symptoms further. She is having right leg pain, weakness, and numbness still. 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 She is still having migraines but not as frequent. She is noticing they are cut down by a few a month so far. She does feel like they are still coming from her neck surgical site and right shoulder. Using Krissy Moots and Aimovig and Botox right now.   wants to look at a cervical and lumbar MARYAM for pain control     No Known Allergies    Current Outpatient Medications   Medication Sig    Aimovig Autoinjector 70 mg/mL injection ADMINISTER 1 SYRINGE UNDER THE SKIN EVERY 28 DAYS    gabapentin (NEURONTIN) 800 mg tablet TAKE 1 TABLET BY MOUTH THREE TIMES DAILY    ubrogepant (Ubrelvy) 50 mg tablet 1 at HA onset and repeat in 2 hours if needed. Max 2 in 24 hours    cholecalciferol, vitamin D3, (VITAMIN D3) 2,000 unit tab Take 1 Tab by mouth every morning.  loratadine (CLARITIN) 10 mg tablet Take 10 mg by mouth daily as needed.  calcium carbonate/vitamin D3 (CALCIUM+D PO) Take 1 Tab by mouth every morning.  acetaminophen (TYLENOL EXTRA STRENGTH) 500 mg tablet Take 1,000 mg by mouth two (2) times a day. No current facility-administered medications for this visit.         Social History     Tobacco Use   Smoking Status Never Smoker   Smokeless Tobacco Never Used       Past Medical History:   Diagnosis Date    Arthritis     neck    Depression     Lobular carcinoma in situ of breast 2012    right    Migraines 2018       Past Surgical History:   Procedure Laterality Date    HX BREAST LUMPECTOMY Right     HX CERVICAL FUSION  2019    HX GYN          HX TONSILLECTOMY         Family History   Problem Relation Age of Onset    Cancer Mother         Pancreatic    Breast Cancer Sister 40    Diabetes Father     Pacemaker Brother     No Known Problems Brother        Social History     Socioeconomic History    Marital status: SINGLE     Spouse name: Not on file    Number of children: Not on file    Years of education: Not on file    Highest education level: Not on file   Tobacco Use    Smoking status: Never Smoker    Smokeless tobacco: Never Used   Substance and Sexual Activity    Alcohol use: No    Drug use: No    Sexual activity: Yes     Partners: Male       Review of Systems   Constitutional: Positive for malaise/fatigue. Eyes: Negative for blurred vision, double vision and photophobia. Musculoskeletal: Positive for back pain and neck pain. Neurological: Positive for tingling, sensory change, weakness and headaches. Negative for tremors. Remainder of comprehensive review is negative.      Physical Exam :    Visit Vitals  /60   Pulse (!) 105   Temp 98.8 °F (37.1 °C)   Ht 5' 3\" (1.6 m)   Wt 78.5 kg (173 lb)   SpO2 98%   BMI 30.65 kg/m²               Results for orders placed or performed during the hospital encounter of 86/28/11   METABOLIC PANEL, BASIC   Result Value Ref Range    Sodium 142 136 - 145 mmol/L    Potassium 3.9 3.5 - 5.1 mmol/L    Chloride 107 97 - 108 mmol/L    CO2 28 21 - 32 mmol/L    Anion gap 7 5 - 15 mmol/L    Glucose 101 (H) 65 - 100 mg/dL    BUN 13 6 - 20 MG/DL    Creatinine 0.76 0.55 - 1.02 MG/DL    BUN/Creatinine ratio 17 12 - 20      GFR est AA >60 >60 ml/min/1.73m2    GFR est non-AA >60 >60 ml/min/1.73m2    Calcium 8.4 (L) 8.5 - 10.1 MG/DL   HEMOGLOBIN   Result Value Ref Range    HGB 11.0 (L) 11.5 - 16.0 g/dL   HCG URINE, QL. - POC   Result Value Ref Range    Pregnancy test,urine (POC) NEGATIVE  NEG         Orders Placed This Encounter    REFERRAL TO PAIN MANAGEMENT     Referral Priority:   Routine     Referral Type:   Consultation     Referral Reason:   Specialty Services Required     Referred to Provider:   Audery Osgood, MD     Number of Visits Requested:   1       1. Lumbar radiculopathy    2. Cervical radiculopathy        discussed results in full. Send to Dr. Keri Montalvo pain management to evaluate for cervical and lumbar MARYAM as we are having trouble controlling symptoms. keep with PT exercises. Keep Botox, Aimovig, Ubrelvy for migraine.    FU after assessment             This note will not be viewable in [facility] [employer]

## 2020-06-18 ENCOUNTER — TELEPHONE (OUTPATIENT)
Dept: NEUROLOGY | Age: 50
End: 2020-06-18

## 2020-06-18 NOTE — TELEPHONE ENCOUNTER
----- Message from Luzma Meléndez sent at 6/18/2020 11:07 AM EDT -----  Regarding: Gareth/Telephone  General Message/Vendor Calls    Caller's first and last name:      Reason for call:best recommended corona virus testing       Callback required yes/no and why:yes      Best contact number(s): [phone]      Details to clarify the request: Pt called requesting a call back in regards to which COVID 19 test is recommended for the pt due to pt has a plate and a screw in her neck .       Luzma Meléndez

## 2020-07-22 ENCOUNTER — TELEPHONE (OUTPATIENT)
Dept: NEUROLOGY | Age: 50
End: 2020-07-22

## 2020-07-28 NOTE — TELEPHONE ENCOUNTER
Received patient's Botox today from [facility].   [phone]  RX # [id number]  With 2 refills remaining

## 2020-08-18 ENCOUNTER — OFFICE VISIT (OUTPATIENT)
Dept: NEUROLOGY | Age: 50
End: 2020-08-18
Payer: COMMERCIAL

## 2020-08-18 VITALS — TEMPERATURE: 97.1 F

## 2020-08-18 PROCEDURE — 64615 CHEMODENERV MUSC MIGRAINE: CPT | Performed by: NURSE PRACTITIONER

## 2020-08-18 RX ORDER — ONABOTULINUMTOXINA 200 [USP'U]/1
INJECTION, POWDER, LYOPHILIZED, FOR SOLUTION INTRADERMAL; INTRAMUSCULAR
Qty: 1 VIAL | Refills: 0 | Status: SHIPPED | COMMUNITY
Start: 2020-08-18 | End: 2020-11-06

## 2020-08-18 NOTE — PROGRESS NOTES
[facility]  OFFICE PROCEDURE PROGRESS NOTE        Chart reviewed for the following:   Vicki LO NP, have reviewed the History, Physical and updated the Allergic reactions for [address] performed immediately prior to start of procedure:   Neelima LO NP, have performed the following reviews on Deven Ngo prior to the start of the procedure:            * Patient was identified by name and date of birth   * Agreement on procedure being performed was verified  * Risks and Benefits explained to the patient  * Procedure site verified and marked as necessary  * Patient was positioned for comfort  * Consent was signed and verified     Time: 1100      Date of procedure: 8/18/2020    Procedure performed by:  Neelima Merlos NP    Provider assisted by: None    Patient assisted by: None    How tolerated by patient: tolerated the procedure well with no complications    Post Procedural Pain Scale: 2 - Hurts Little Bit    Comments: None      Botox Injection Note       Indication: patient has chronic recurrent migraine, has 7-10 less migraine days per month with botox injections    Procedure:   Botox concentration: 200 units in 4 ml of preservative-free normal saline. 31 sites injections, distribution as follow      Units/site  Sites Sides Subtotal    Procerus 5 1 1 5    5 1 2 10   Frontalis 5 2 2 20   Temporalis 5 4 2 40   Occipitalis 5 3 2 30   Upper cervical paraspinalis 5 2 2 20   Trapezius 5 3 2 30         200 units Botox were reconstituted, 155 units injected as above and the remainder was unavoidably wasted.      Patient tolerated procedure well.     _____________________________   Melanie Ryan NP

## 2020-10-19 DIAGNOSIS — G62.9 NEUROPATHY: ICD-10-CM

## 2020-10-20 RX ORDER — GABAPENTIN 800 MG/1
TABLET ORAL
Qty: 90 TAB | Refills: 1 | Status: SHIPPED | OUTPATIENT
Start: 2020-10-20 | End: 2021-02-16 | Stop reason: SDUPTHER

## 2020-10-23 ENCOUNTER — TELEPHONE (OUTPATIENT)
Dept: NEUROLOGY | Age: 50
End: 2020-10-23

## 2020-11-06 RX ORDER — ONABOTULINUMTOXINA 200 [USP'U]/1
INJECTION, POWDER, LYOPHILIZED, FOR SOLUTION INTRADERMAL; INTRAMUSCULAR
Qty: 1 VIAL | Refills: 4 | Status: SHIPPED | OUTPATIENT
Start: 2020-11-06 | End: 2021-09-03 | Stop reason: ALTCHOICE

## 2020-11-17 ENCOUNTER — OFFICE VISIT (OUTPATIENT)
Dept: NEUROLOGY | Age: 50
End: 2020-11-17
Payer: COMMERCIAL

## 2020-11-17 DIAGNOSIS — G43.919 INTRACTABLE MIGRAINE WITHOUT STATUS MIGRAINOSUS, UNSPECIFIED MIGRAINE TYPE: Primary | ICD-10-CM

## 2020-11-17 PROCEDURE — 64615 CHEMODENERV MUSC MIGRAINE: CPT | Performed by: NURSE PRACTITIONER

## 2020-11-17 RX ORDER — ONABOTULINUMTOXINA 200 [USP'U]/1
INJECTION, POWDER, LYOPHILIZED, FOR SOLUTION INTRADERMAL; INTRAMUSCULAR
Qty: 1 VIAL | Refills: 0 | Status: CANCELLED | COMMUNITY
Start: 2020-11-17

## 2020-11-17 RX ORDER — GALCANEZUMAB 120 MG/ML
120 INJECTION, SOLUTION SUBCUTANEOUS
Qty: 1 SYRINGE | Refills: 5 | Status: SHIPPED | OUTPATIENT
Start: 2020-11-17 | End: 2021-02-16

## 2020-11-17 NOTE — PROGRESS NOTES
[facility]  OFFICE PROCEDURE PROGRESS NOTE        Chart reviewed for the following:   I, [de-identified] M DANIEL Mejia, have reviewed the History, Physical and updated the Allergic reactions for [address] performed immediately prior to start of procedure:   I, Hanna Davis NP, have performed the following reviews on Martha Gonzalez prior to the start of the procedure:            * Patient was identified by name and date of birth   * Agreement on procedure being performed was verified  * Risks and Benefits explained to the patient  * Procedure site verified and marked as necessary  * Patient was positioned for comfort  * Consent was signed and verified     Time: 1130      Date of procedure: 11/17/2020    Procedure performed by:  Hanna Davis NP    Provider assisted by: None    Patient assisted by: None    How tolerated by patient: tolerated the procedure well with no complications    Post Procedural Pain Scale: 2 - Hurts Little Bit    Comments: None      Botox Injection Note       Indication: patient has chronic recurrent migraine, has 7-10 less migraine days per month with botox injections    Procedure:   Botox concentration: 200 units in 4 ml of preservative-free normal saline. 31 sites injections, distribution as follow      Units/site  Sites Sides Subtotal    Procerus 5 1 1 5    5 1 2 10   Frontalis 5 2 2 20   Temporalis 5 4 2 40   Occipitalis 5 3 2 30   Upper cervical paraspinalis 5 2 2 20   Trapezius 5 3 2 30         200 units Botox were reconstituted, 155 units injected as above and the remainder was unavoidably wasted.      Patient tolerated procedure well.     _____________________________   Carry Nicely was working but caused significant site reaction  Will switch to [facility]

## 2020-11-24 DIAGNOSIS — G43.919 INTRACTABLE MIGRAINE WITHOUT STATUS MIGRAINOSUS, UNSPECIFIED MIGRAINE TYPE: ICD-10-CM

## 2020-11-24 RX ORDER — ERENUMAB-AOOE 70 MG/ML
INJECTION SUBCUTANEOUS
Qty: 1 ML | Refills: 5 | Status: SHIPPED | OUTPATIENT
Start: 2020-11-24 | End: 2021-02-16

## 2021-01-26 ENCOUNTER — TRANSCRIBE ORDER (OUTPATIENT)
Dept: REGISTRATION | Age: 51
End: 2021-01-26

## 2021-01-26 ENCOUNTER — HOSPITAL ENCOUNTER (OUTPATIENT)
Dept: GENERAL RADIOLOGY | Age: 51
Discharge: HOME | End: 2021-01-26
Payer: COMMERCIAL

## 2021-01-26 DIAGNOSIS — M25.511 RIGHT SHOULDER PAIN: Primary | ICD-10-CM

## 2021-01-26 DIAGNOSIS — M25.511 RIGHT SHOULDER PAIN: ICD-10-CM

## 2021-01-26 PROCEDURE — 73030 X-RAY EXAM OF SHOULDER: CPT

## 2021-01-27 RX ORDER — GALCANEZUMAB 120 MG/ML
240 INJECTION, SOLUTION SUBCUTANEOUS
Qty: 2 ML | Refills: 0 | Status: SHIPPED | COMMUNITY
Start: 2021-01-27 | End: 2021-02-16

## 2021-02-16 ENCOUNTER — HOSPITAL ENCOUNTER (OUTPATIENT)
Dept: GENERAL RADIOLOGY | Age: 51
Discharge: HOME | End: 2021-02-16
Attending: NURSE PRACTITIONER
Payer: COMMERCIAL

## 2021-02-16 ENCOUNTER — OFFICE VISIT (OUTPATIENT)
Dept: NEUROLOGY | Age: 51
End: 2021-02-16
Payer: COMMERCIAL

## 2021-02-16 VITALS — TEMPERATURE: 97.5 F

## 2021-02-16 DIAGNOSIS — G62.9 NEUROPATHY: ICD-10-CM

## 2021-02-16 DIAGNOSIS — M25.551 RIGHT HIP PAIN: ICD-10-CM

## 2021-02-16 DIAGNOSIS — M25.551 RIGHT HIP PAIN: Primary | ICD-10-CM

## 2021-02-16 PROCEDURE — 73502 X-RAY EXAM HIP UNI 2-3 VIEWS: CPT

## 2021-02-16 PROCEDURE — 64615 CHEMODENERV MUSC MIGRAINE: CPT | Performed by: NURSE PRACTITIONER

## 2021-02-16 RX ORDER — ONABOTULINUMTOXINA 200 [USP'U]/1
INJECTION, POWDER, LYOPHILIZED, FOR SOLUTION INTRADERMAL; INTRAMUSCULAR
Qty: 1 VIAL | Refills: 0 | Status: SHIPPED | COMMUNITY
Start: 2021-02-16 | End: 2021-05-04 | Stop reason: SDUPTHER

## 2021-02-16 RX ORDER — GABAPENTIN 800 MG/1
TABLET ORAL
Qty: 90 TAB | Refills: 5 | Status: SHIPPED | OUTPATIENT
Start: 2021-02-16 | End: 2021-08-27

## 2021-02-16 NOTE — PROGRESS NOTES
[facility]  OFFICE PROCEDURE PROGRESS NOTE        Chart reviewed for the following:   I, [de-identified] M DANIEL Mejia, have reviewed the History, Physical and updated the Allergic reactions for [address] performed immediately prior to start of procedure:   I, Jeanette Boyle NP, have performed the following reviews on Jakub Levo prior to the start of the procedure:            * Patient was identified by name and date of birth   * Agreement on procedure being performed was verified  * Risks and Benefits explained to the patient  * Procedure site verified and marked as necessary  * Patient was positioned for comfort  * Consent was signed and verified     Time: 1100      Date of procedure: 2/16/2021    Procedure performed by:  Jeanette Boyle NP    Provider assisted by: None    Patient assisted by: None    How tolerated by patient: tolerated the procedure well with no complications    Post Procedural Pain Scale: 2 - Hurts Little Bit    Comments: None      Botox Injection Note       Indication: patient has chronic recurrent migraine, has 7-10 less migraine days per month with botox injections    Procedure:   Botox concentration: 200 units in 4 ml of preservative-free normal saline. 31 sites injections, distribution as follow      Units/site  Sites Sides Subtotal    Procerus 5 1 1 5    5 1 2 10   Frontalis 5 2 2 20   Temporalis 5 4 2 40   Occipitalis 5 3 2 30   Upper cervical paraspinalis 5 2 2 20   Trapezius 5 3 2 30         200 units Botox were reconstituted, 155 units injected as above and the remainder was unavoidably wasted.      Patient tolerated procedure well.     _____________________________   Meghna Keller

## 2021-05-03 ENCOUNTER — TELEPHONE (OUTPATIENT)
Dept: NEUROLOGY | Age: 51
End: 2021-05-03

## 2021-05-03 NOTE — TELEPHONE ENCOUNTER
Supa Corea,  Patient's botox PA  in 2021. Would you please re-auth? She is scheduled for next botox on 2021.

## 2021-05-04 RX ORDER — ONABOTULINUMTOXINA 200 [USP'U]/1
INJECTION, POWDER, LYOPHILIZED, FOR SOLUTION INTRADERMAL; INTRAMUSCULAR
Qty: 1 VIAL | Refills: 0 | Status: SHIPPED | OUTPATIENT
Start: 2021-05-04 | End: 2021-05-06 | Stop reason: SDUPTHER

## 2021-05-04 NOTE — TELEPHONE ENCOUNTER
----- Message from Bee Gastelum sent at 5/4/2021 12:21 PM EDT -----  Regarding: Nahomy/Zahira  Contact: [phone]  General Message/Vendor Calls    Caller's first and last name: [place] [facility] nurse      Reason for call:Leobardo would like a call back to get the dosage of the botox the pt needs.        Callback required yes/no and why:Yes, provide info      Best contact number(s):[phone]      Details to clarify the request:n/a      Bee Gastelum

## 2021-05-04 NOTE — TELEPHONE ENCOUNTER
Re BOTOX APPROVED via telephone 5 [phone] effective 5/4/21 to 5/4/22 [id number] and cpt [id number] x 1 year  PA [id number]    [facility]     PLEASE SEND RX TO [facility] TO SET UP SHIPMENT

## 2021-05-05 NOTE — TELEPHONE ENCOUNTER
Called [facility] to set up botox delivery and was told to call Kaila. Called [facility], spoke to Melinda Vides. Scheduled patient's botox to ship on Wed. 5/12/21. Regarding the other telephone encounter. I called the [facility] nurse back and left her a detailed message that the patient's botox prescription is for 200 units vial, inject 155 units.

## 2021-05-06 RX ORDER — ONABOTULINUMTOXINA 200 [USP'U]/1
INJECTION, POWDER, LYOPHILIZED, FOR SOLUTION INTRADERMAL; INTRAMUSCULAR
Qty: 1 VIAL | Refills: 0 | Status: SHIPPED | OUTPATIENT
Start: 2021-05-06 | End: 2021-09-03 | Stop reason: ALTCHOICE

## 2021-05-18 ENCOUNTER — OFFICE VISIT (OUTPATIENT)
Dept: NEUROLOGY | Age: 51
End: 2021-05-18
Payer: COMMERCIAL

## 2021-05-18 DIAGNOSIS — G43.919 INTRACTABLE MIGRAINE WITHOUT STATUS MIGRAINOSUS, UNSPECIFIED MIGRAINE TYPE: Primary | ICD-10-CM

## 2021-05-18 PROCEDURE — 64615 CHEMODENERV MUSC MIGRAINE: CPT | Performed by: NURSE PRACTITIONER

## 2021-05-18 RX ORDER — RIMEGEPANT SULFATE 75 MG/75MG
TABLET, ORALLY DISINTEGRATING ORAL
Qty: 8 TAB | Refills: 0 | Status: SHIPPED | OUTPATIENT
Start: 2021-05-18 | End: 2021-12-10 | Stop reason: ALTCHOICE

## 2021-05-18 NOTE — PATIENT INSTRUCTIONS
Try Nurtec for as needed headache rescue Look into Vyepti as a prevention medication every 3 months for migraine.

## 2021-05-18 NOTE — PROGRESS NOTES
[facility]  OFFICE PROCEDURE PROGRESS NOTE        Chart reviewed for the following:   I, [de-identified] M DANIEL Mejia, have reviewed the History, Physical and updated the Allergic reactions for [address] performed immediately prior to start of procedure:   I, Neelima Merlos NP, have performed the following reviews on Deven Ngo prior to the start of the procedure:            * Patient was identified by name and date of birth   * Agreement on procedure being performed was verified  * Risks and Benefits explained to the patient  * Procedure site verified and marked as necessary  * Patient was positioned for comfort  * Consent was signed and verified     Time: 1115      Date of procedure: 5/18/2021    Procedure performed by:  Neelima Merlos NP    Provider assisted by: None    Patient assisted by: None    How tolerated by patient: tolerated the procedure well with no complications    Post Procedural Pain Scale: 2 - Hurts Little Bit    Comments: None      Botox Injection Note       Indication: patient has chronic recurrent migraine, has 7-10 less migraine days per month with botox injections    Procedure:   Botox concentration: 200 units in 4 ml of preservative-free normal saline. 31 sites injections, distribution as follow      Units/site  Sites Sides Subtotal    Procerus 5 1 1 5    5 1 2 10   Frontalis 5 2 2 20   Temporalis 5 4 2 40   Occipitalis 5 3 2 30   Upper cervical paraspinalis 5 2 2 20   Trapezius 5 3 2 30         200 units Botox were reconstituted, 155 units injected as above and the remainder was unavoidably wasted. Patient tolerated procedure well.     _____________________________   Thi Aguilar       Try [facility] for PRN HA rescue to see if can work faster then Ashley Milan.

## 2021-06-03 DIAGNOSIS — G43.919 INTRACTABLE MIGRAINE WITHOUT STATUS MIGRAINOSUS, UNSPECIFIED MIGRAINE TYPE: Primary | ICD-10-CM

## 2021-06-03 RX ORDER — EPTINEZUMAB-JJMR 100 MG/ML
100 INJECTION INTRAVENOUS
Qty: 1 ML | Refills: 5 | Status: SHIPPED | OUTPATIENT
Start: 2021-06-03 | End: 2022-01-21 | Stop reason: ALTCHOICE

## 2021-06-04 DIAGNOSIS — R41.3 MEMORY LOSS: ICD-10-CM

## 2021-06-04 DIAGNOSIS — H53.9 VISUAL CHANGES: Primary | ICD-10-CM

## 2021-06-15 ENCOUNTER — HOSPITAL ENCOUNTER (OUTPATIENT)
Dept: MRI IMAGING | Age: 51
Discharge: HOME | End: 2021-06-15
Attending: NURSE PRACTITIONER
Payer: COMMERCIAL

## 2021-06-15 DIAGNOSIS — R41.3 MEMORY LOSS: ICD-10-CM

## 2021-06-15 DIAGNOSIS — H53.9 VISUAL CHANGES: ICD-10-CM

## 2021-06-15 PROCEDURE — 70551 MRI BRAIN STEM W/O DYE: CPT

## 2021-07-13 ENCOUNTER — TELEPHONE (OUTPATIENT)
Dept: NEUROLOGY | Age: 51
End: 2021-07-13

## 2021-07-13 NOTE — TELEPHONE ENCOUNTER
----- Message from Jonathan FONU2 sent at 7/12/2021  3:18 PM EDT -----  Regarding: DANIEL eMjia/ telephone  General Message/Vendor Calls    Caller's first and last name: n/a      Reason for call: eye concerns      Callback required yes/no and why: yes       Best contact number(s):[phone]        Details to clarify the request: She stated her left eye is constantly twitching and her medication is now caused her to be having constipation and would like to be contacted as soon as possible.       [facility] FONU2

## 2021-07-13 NOTE — TELEPHONE ENCOUNTER
What does she think is causing it?    The only newer thing was Vyepti    We can stop that if she wants

## 2021-07-13 NOTE — TELEPHONE ENCOUNTER
Patient states she thinks its the medication but is unsure of which one. Her left eye twitching, constipation, left hip pain for about 3 weeks almost a month.

## 2021-07-14 NOTE — TELEPHONE ENCOUNTER
Hip pain is something different. ......... can send her for an x ray if she wants to look at that further-------let me know. .... The twitching can be medication or stress. Constipation can be a lot also. Only thing was Vyepti new- did she end up getting the infusion, and if so, when?

## 2021-07-29 ENCOUNTER — TELEPHONE (OUTPATIENT)
Dept: NEUROLOGY | Age: 51
End: 2021-07-29

## 2021-08-03 ENCOUNTER — TELEPHONE (OUTPATIENT)
Dept: NEUROLOGY | Age: 51
End: 2021-08-03

## 2021-08-03 NOTE — TELEPHONE ENCOUNTER
----- Message from Mary Lea sent at 8/3/2021  8:32 AM EDT -----  Regarding: Gareth/Refill  Medication Refill    Caller (if not patient): Fritz      Relationship of caller (if not patient):      Best contact number(s):      Name of medication and dosage if known: Botox      Is patient out of this medication (yes/no):      Pharmacy name: [facility] listed in chart? (yes/no): yes  Pharmacy phone number: [phone]      Details to clarify the request: [facility] called requesting delivery of the Botox .       Mary Lea

## 2021-08-27 DIAGNOSIS — G62.9 NEUROPATHY: ICD-10-CM

## 2021-08-27 RX ORDER — GABAPENTIN 800 MG/1
TABLET ORAL
Qty: 90 TABLET | Refills: 4 | Status: SHIPPED | OUTPATIENT
Start: 2021-08-27 | End: 2021-08-30 | Stop reason: SDUPTHER

## 2021-08-27 NOTE — TELEPHONE ENCOUNTER
----- Message from Kay Desir sent at 8/27/2021 11:43 AM EDT -----  Regarding: NP Roberto/Refill  Medication Refill    Caller (if not patient):n/a      Relationship of caller (if not patient):n/a      Best contact number(s):[phone]      Name of medication and dosage if known:\"gabapentin (NEURONTIN) 800 mg tablet\"      Is patient out of this medication (yes/no): No      Pharmacy name:[facility]    Pharmacy listed in chart? (yes/no):Yes  Pharmacy phone number:      Details to clarify the request:Pt almost out      Kay Desir

## 2021-08-30 RX ORDER — GABAPENTIN 800 MG/1
800 TABLET ORAL 3 TIMES DAILY
Qty: 90 TABLET | Refills: 4 | Status: SHIPPED | OUTPATIENT
Start: 2021-08-30

## 2021-09-03 ENCOUNTER — OFFICE VISIT (OUTPATIENT)
Dept: NEUROLOGY | Age: 51
End: 2021-09-03
Payer: COMMERCIAL

## 2021-09-03 VITALS
BODY MASS INDEX: 33.04 KG/M2 | HEIGHT: 63 IN | WEIGHT: 186.5 LBS | HEART RATE: 84 BPM | DIASTOLIC BLOOD PRESSURE: 76 MMHG | SYSTOLIC BLOOD PRESSURE: 122 MMHG | OXYGEN SATURATION: 99 %

## 2021-09-03 DIAGNOSIS — M54.12 CERVICAL RADICULOPATHY: ICD-10-CM

## 2021-09-03 DIAGNOSIS — M54.2 NECK PAIN WITH HISTORY OF CERVICAL SPINAL SURGERY: ICD-10-CM

## 2021-09-03 DIAGNOSIS — Z98.890 NECK PAIN WITH HISTORY OF CERVICAL SPINAL SURGERY: ICD-10-CM

## 2021-09-03 PROCEDURE — 99215 OFFICE O/P EST HI 40 MIN: CPT | Performed by: NURSE PRACTITIONER

## 2021-09-03 NOTE — PROGRESS NOTES
Lisa Mendoza is a 45 y.o. female who presents with the following  Chief Complaint   Patient presents with    Follow-up     headaches       HPI     FU for migraine, Vyepti. She received 100 mg dosing and states she saw positives with this. Less painful and less frequent migraines. She is interested in the next infusion   She uses Nurtec Vs. Ubrelvy for PRN rescue of migraine   Depends on how she feels and what she has. Sometimes does not need anything   Is OFF Botox and does not think it helped much   Seeing pain management and getting cervical spine injections   Is going to call soon as the other day had some neck stiffness, tightness, and spasms. Worst that she has had in a while. Muscle relaxer helped and she lie down   More work and activity may have triggered this. She is still having neck pain. Arm weakness, numbness. Still having post surgical pain   Feels like its not getting better either  Feels like a lot of her headaches are coming from this also. No recent images. No Known Allergies    Current Outpatient Medications   Medication Sig    gabapentin (NEURONTIN) 800 mg tablet Take 1 Tablet by mouth three (3) times daily.  eptinezumab-jj (Vyepti) 100 mg/mL injection 1 mL by IntraVENous route every three (3) months.  rimegepant (Nurtec ODT) 75 mg disintegrating tablet Take 1 tablet at HA onset PRN. Max 1 tablet in 24 hours.  ubrogepant [facility]) 50 mg tablet 1 at HA onset and repeat in 2 hours if needed. Max 2 in 24 hours    cholecalciferol, vitamin D3, (VITAMIN D3) 2,000 unit tab Take 1 Tab by mouth every morning.  loratadine (CLARITIN) 10 mg tablet Take 10 mg by mouth daily as needed.  calcium carbonate/vitamin D3 (CALCIUM+D PO) Take 1 Tab by mouth every morning.  acetaminophen (TYLENOL EXTRA STRENGTH) 500 mg tablet Take 1,000 mg by mouth two (2) times a day. No current facility-administered medications for this visit.        Social History     Tobacco Use   Smoking Status Never Smoker   Smokeless Tobacco Never Used       Past Medical History:   Diagnosis Date    Arthritis     neck    Depression     Lobular carcinoma in situ of breast 2012    right    Migraines 2018       Past Surgical History:   Procedure Laterality Date    HX BREAST LUMPECTOMY Right 2012    HX CERVICAL FUSION  2019    HX GYN          HX TONSILLECTOMY  1980       Family History   Problem Relation Age of Onset    Cancer Mother         Pancreatic    Breast Cancer Sister 40    Diabetes Father     Pacemaker Brother     No Known Problems Brother        Social History     Socioeconomic History    Marital status: SINGLE     Spouse name: Not on file    Number of children: Not on file    Years of education: Not on file    Highest education level: Not on file   Tobacco Use    Smoking status: Never Smoker    Smokeless tobacco: Never Used   Substance and Sexual Activity    Alcohol use: No    Drug use: No    Sexual activity: Yes     Partners: Male     Social Determinants of Health     Financial Resource Strain:     Difficulty of Paying Living Expenses:    Food Insecurity:     Worried About Running Out of Food in the Last Year:     Ran Out of Food in the Last Year:    Transportation Needs:     Lack of Transportation (Medical):  Lack of Transportation (Non-Medical):    Physical Activity:     Days of Exercise per Week:     Minutes of Exercise per Session:    Stress:     Feeling of Stress :    Social Connections:     Frequency of Communication with Friends and Family:     Frequency of Social Gatherings with Friends and Family:     Attends [employer] Services:     Active Member of Clubs or Organizations:     Attends Club or Organization Meetings:     Marital Status:        Review of Systems   Eyes: Positive for blurred vision and photophobia. Negative for double vision. Respiratory: Negative for shortness of breath and wheezing.     Gastrointestinal: Positive for nausea. Negative for vomiting. Musculoskeletal: Positive for neck pain. Neurological: Positive for dizziness, tingling, sensory change, weakness and headaches. Negative for seizures and loss of consciousness. Remainder of comprehensive review is negative. Physical Exam :    Visit Vitals  /76   Pulse 84   Ht 5' 3\" (1.6 m)   Wt 84.6 kg (186 lb 8 oz)   SpO2 99%   BMI 33.04 kg/m²       General: Well defined, nourished, and groomed individual in no acute distress.    Neck: Supple, nontender, no bruits, no pain with resistance to active range of motion.    Musculoskeletal: Extremities revealed no edema and had full range of motion of joints.    Psych: Good mood and bright affect    NEUROLOGICAL EXAMINATION:    Mental Status: Alert and oriented to person, place, and time    Cranial Nerves:    II, III, IV, VI: Visual acuity grossly intact. Visual fields are normal.    Pupils are equal, round, and reactive to light and accommodation.    Extra-ocular movements are full and fluid. Fundoscopic exam was benign, no ptosis or nystagmus.    V-XII: Hearing is grossly intact. Facial features are symmetric, with normal sensation and strength. The palate rises symmetrically and the tongue protrudes midline. Sternocleidomastoids 5/5. Motor Examination: Normal tone, bulk, and strength, 5/5 muscle strength throughout. 4/5 UE bilaterally. Coordination: Finger to nose was normal. No resting or intention tremor    Gait and Station: Steady while walking. Normal arm swing. No pronator drift. No muscle wasting or fasiculations noted. Reflexes: DTRs 2+ throughout.             Results for orders placed or performed during the hospital encounter of 15/35/16   METABOLIC PANEL, BASIC   Result Value Ref Range    Sodium 142 136 - 145 mmol/L    Potassium 3.9 3.5 - 5.1 mmol/L    Chloride 107 97 - 108 mmol/L    CO2 28 21 - 32 mmol/L    Anion gap 7 5 - 15 mmol/L    Glucose 101 (H) 65 - 100 mg/dL    BUN 13 6 - 20 MG/DL    Creatinine 0.76 0.55 - 1.02 MG/DL    BUN/Creatinine ratio 17 12 - 20      GFR est AA >60 >60 ml/min/1.73m2    GFR est non-AA >60 >60 ml/min/1.73m2    Calcium 8.4 (L) 8.5 - 10.1 MG/DL   HEMOGLOBIN   Result Value Ref Range    HGB 11.0 (L) 11.5 - 16.0 g/dL   HCG URINE, QL. - POC   Result Value Ref Range    Pregnancy test,urine (POC) NEGATIVE  NEG         Orders Placed This Encounter    MRI CERV SPINE WO CONT     Standing Status:   Future     Standing Expiration Date:   10/3/2022       1. Chronic migraine    2. Cervical radiculopathy    3. Neck pain with history of cervical spinal surgery           Discussed POC in full. Keep active as she can   Working well nights   Keep Vyepti at 100 mg for treatment dosing  She saw positives and will continue. Keep Arneta Basset for PRN rescue. She will get a MRI c spine to re-evaluate neck surgical site. She is following with pain management and still having symptoms post surgical procedure. FU after.              This note will not be viewable in [employer]

## 2021-09-16 ENCOUNTER — HOSPITAL ENCOUNTER (OUTPATIENT)
Dept: MRI IMAGING | Age: 51
Discharge: HOME | End: 2021-09-16
Attending: NURSE PRACTITIONER
Payer: COMMERCIAL

## 2021-09-16 DIAGNOSIS — M54.12 CERVICAL RADICULOPATHY: ICD-10-CM

## 2021-09-16 DIAGNOSIS — M54.2 NECK PAIN WITH HISTORY OF CERVICAL SPINAL SURGERY: ICD-10-CM

## 2021-09-16 DIAGNOSIS — Z98.890 NECK PAIN WITH HISTORY OF CERVICAL SPINAL SURGERY: ICD-10-CM

## 2021-09-16 PROCEDURE — 72141 MRI NECK SPINE W/O DYE: CPT

## 2021-10-08 ENCOUNTER — PATIENT MESSAGE (OUTPATIENT)
Dept: NEUROLOGY | Age: 51
End: 2021-10-08

## 2021-10-13 ENCOUNTER — PATIENT MESSAGE (OUTPATIENT)
Dept: NEUROLOGY | Age: 51
End: 2021-10-13

## 2021-12-06 ENCOUNTER — OFFICE VISIT (OUTPATIENT)
Dept: OBGYN CLINIC | Age: 51
End: 2021-12-06
Payer: COMMERCIAL

## 2021-12-06 VITALS
BODY MASS INDEX: 33.72 KG/M2 | HEIGHT: 62 IN | DIASTOLIC BLOOD PRESSURE: 70 MMHG | SYSTOLIC BLOOD PRESSURE: 120 MMHG | WEIGHT: 183.25 LBS

## 2021-12-06 DIAGNOSIS — Z01.419 ROUTINE GYNECOLOGICAL EXAMINATION: ICD-10-CM

## 2021-12-06 DIAGNOSIS — Z12.4 SCREENING FOR MALIGNANT NEOPLASM OF CERVIX: Primary | ICD-10-CM

## 2021-12-06 DIAGNOSIS — Z11.3 SCREENING EXAMINATION FOR VENEREAL DISEASE: ICD-10-CM

## 2021-12-06 DIAGNOSIS — D21.9 FIBROID: ICD-10-CM

## 2021-12-06 PROBLEM — Z85.3 PERSONAL HISTORY OF BREAST CANCER: Status: ACTIVE | Noted: 2021-12-06

## 2021-12-06 PROCEDURE — 99386 PREV VISIT NEW AGE 40-64: CPT | Performed by: OBSTETRICS & GYNECOLOGY

## 2021-12-06 NOTE — PROGRESS NOTES
Chief Complaint   Patient presents with   [facility] Patient     Annual Exam, Mammo-2 weeks ago     Visit Vitals  /70 (BP 1 Location: Left upper arm, BP Patient Position: Sitting, BP Cuff Size: Large adult)   Ht 5' 2\" (1.575 m)   Wt 183 lb 4 oz (83.1 kg)   LMP 06/01/2021 (Approximate)   BMI 33.52 kg/m²

## 2021-12-06 NOTE — PROGRESS NOTES
Chapin Blackman is a , 45 y.o. female   Patient's last menstrual period was 2021 (approximate). She presents for her annual    She is having no significant problems. Menstrual status:  Cycles are menopausal.    Flow: absent. She does not have dysmenorrhea. Medical conditions:  Since her last annual GYN exam about one year ago, she has not the following changes in her health history: none. Mammogram History:    DOTTIE Results (most recent):  Results from Abstract encounter on 21    DOTTIE 3D FARNAZ W MAMMO BI SCREENING INCL CAD       DEXA Results (most recent):  No results found for this or any previous visit. Past Medical History:   Diagnosis Date    Arthritis     neck    Depression     Lobular carcinoma in situ of breast 2012    right    Migraines 2018     Past Surgical History:   Procedure Laterality Date    HX BREAST LUMPECTOMY Right     HX CERVICAL FUSION  2019    HX GI  2021    colonoscopy    HX GYN          HX TONSILLECTOMY         Prior to Admission medications    Medication Sig Start Date End Date Taking? Authorizing Provider   gabapentin (NEURONTIN) 800 mg tablet Take 1 Tablet by mouth three (3) times daily. 21  Yes Suad Mejia NP   eptinezumab-jjmr (Vyepti) 100 mg/mL injection 1 mL by IntraVENous route every three (3) months. 6/3/21  Yes Suad Mejia NP   rimegepant (Nurtec ODT) 75 mg disintegrating tablet Take 1 tablet at HA onset PRN. Max 1 tablet in 24 hours. 21  Yes Suad Mejia NP   ubrogepant Marda Calico) 50 mg tablet 1 at HA onset and repeat in 2 hours if needed. Max 2 in 24 hours 21  Yes Suad Mejia NP   cholecalciferol, vitamin D3, (VITAMIN D3) 2,000 unit tab Take 1 Tab by mouth every morning. Yes Provider, Historical   loratadine (CLARITIN) 10 mg tablet Take 10 mg by mouth daily as needed.    Yes Provider, Historical   calcium carbonate/vitamin D3 (CALCIUM+D PO) Take 1 Tab by mouth every morning. Yes Provider, Historical   acetaminophen (TYLENOL EXTRA STRENGTH) 500 mg tablet Take 1,000 mg by mouth two (2) times a day. Yes Provider, Historical       No Known Allergies       Tobacco History:  reports that she has never smoked. She has never used smokeless tobacco.  Alcohol Abuse:  reports no history of alcohol use. Drug Abuse:  reports no history of drug use. Family Medical/Cancer History:   Family History   Problem Relation Age of Onset    Cancer Mother         Pancreatic    Breast Cancer Sister 40    Diabetes Father     Pacemaker Brother     No Known Problems Brother           Review of Systems   Constitutional: Negative for chills, fever, malaise/fatigue and weight loss. HENT: Negative for congestion, ear pain, sinus pain and tinnitus. Eyes: Negative for blurred vision and double vision. Respiratory: Negative for cough, shortness of breath and wheezing. Cardiovascular: Negative for chest pain and palpitations. Gastrointestinal: Negative for abdominal pain, blood in stool, constipation, diarrhea, heartburn, nausea and vomiting. Genitourinary: Negative for dysuria, flank pain, frequency, hematuria and urgency. Musculoskeletal: Negative for joint pain and myalgias. Skin: Negative for itching and rash. Neurological: Negative for dizziness, weakness and headaches. Psychiatric/Behavioral: Negative for depression, memory loss and suicidal ideas. The patient is not nervous/anxious and does not have insomnia. Physical Exam  Constitutional:       Appearance: Normal appearance. HENT:      Head: Normocephalic and atraumatic. Cardiovascular:      Rate and Rhythm: Normal rate. Heart sounds: Normal heart sounds. Pulmonary:      Effort: Pulmonary effort is normal.      Breath sounds: Normal breath sounds. Chest:   Breasts:      Right: Normal.      Left: Normal.       Abdominal:      General: Abdomen is flat.       Palpations: Abdomen is soft.   Genitourinary:     General: Normal vulva. Vagina: Normal.      Cervix: Normal.      Uterus: Normal. Enlarged. Adnexa: Right adnexa normal and left adnexa normal.      Rectum: Normal.      Comments: PAP Obtained  Neurological:      Mental Status: She is alert. Psychiatric:         Mood and Affect: Mood normal.         Behavior: Behavior normal.         Thought Content: Thought content normal.          Visit Vitals  /70 (BP 1 Location: Left upper arm, BP Patient Position: Sitting, BP Cuff Size: Large adult)   Ht 5' 2\" (1.575 m)   Wt 183 lb 4 oz (83.1 kg)   LMP 06/01/2021 (Approximate)   BMI 33.52 kg/m²         Assessment:   Diagnoses and all orders for this visit:    1. Screening for malignant neoplasm of cervix  -     PAP IG, CT-NG, RFX APTIMA HPV ASCUS ([id number], [id number])    2. Routine gynecological examination  -     PAP IG, CT-NG, RFX APTIMA HPV ASCUS ([id number], [id number])    3. Screening examination for venereal disease  -     PAP IG, CT-NG, RFX APTIMA HPV ASCUS ([id number], [id number])    4. BMI 33.0-33.9,adult    5. Fibroid        Plan: Questions addressed  Counseled re: diet, exercise, healthy lifestyle  Return for Annual  Rec annual mammogram      Follow-up and Dispositions    · Return for 1 yr annual, 1 yr mammo.

## 2021-12-08 LAB
C TRACH RRNA CVX QL NAA+PROBE: NEGATIVE
CYTOLOGIST CVX/VAG CYTO: NORMAL
CYTOLOGY CVX/VAG DOC CYTO: NORMAL
CYTOLOGY CVX/VAG DOC THIN PREP: NORMAL
DX ICD CODE: NORMAL
LABCORP, 190119: NORMAL
Lab: NORMAL
N GONORRHOEA RRNA CVX QL NAA+PROBE: NEGATIVE
OTHER STN SPEC: NORMAL
STAT OF ADQ CVX/VAG CYTO-IMP: NORMAL

## 2021-12-10 ENCOUNTER — OFFICE VISIT (OUTPATIENT)
Dept: NEUROLOGY | Age: 51
End: 2021-12-10
Payer: COMMERCIAL

## 2021-12-10 VITALS
HEART RATE: 98 BPM | OXYGEN SATURATION: 99 % | SYSTOLIC BLOOD PRESSURE: 110 MMHG | TEMPERATURE: 97.3 F | DIASTOLIC BLOOD PRESSURE: 64 MMHG

## 2021-12-10 DIAGNOSIS — M54.12 CERVICAL RADICULOPATHY: Primary | ICD-10-CM

## 2021-12-10 DIAGNOSIS — G43.909 MIGRAINE WITHOUT STATUS MIGRAINOSUS, NOT INTRACTABLE, UNSPECIFIED MIGRAINE TYPE: ICD-10-CM

## 2021-12-10 PROCEDURE — 99214 OFFICE O/P EST MOD 30 MIN: CPT | Performed by: NURSE PRACTITIONER

## 2021-12-10 NOTE — PROGRESS NOTES
Jenna Parker is a 45 y.o. female who presents with the following  Chief Complaint   Patient presents with    Migraine     and cervical radiculopathy       HPI      FU for migraine, cervicogenic CHANEL. Vyepti has been helping   Next infusion is this month. Less painful and less frequent migraines. She is interested in the next infusion   Ubrelvy for PRN rescue of migraine   More frequent right now as her neck is acting back up  To get in with pain to look at [facility] again as this helped   MRI as seen in media. neck stiffness, tightness, and spasms. She is still having neck pain. Arm weakness, numbness. Still having post surgical pain   Feels like its not getting better either  Feels like a lot of her headaches are coming from this also. No Known Allergies    Current Outpatient Medications   Medication Sig    gabapentin (NEURONTIN) 800 mg tablet Take 1 Tablet by mouth three (3) times daily.  eptinezumab-jjmr (Vyepti) 100 mg/mL injection 1 mL by IntraVENous route every three (3) months.  ubrogepant Lolita Butt) 50 mg tablet 1 at HA onset and repeat in 2 hours if needed. Max 2 in 24 hours    cholecalciferol, vitamin D3, (VITAMIN D3) 2,000 unit tab Take 1 Tab by mouth every morning.  loratadine (CLARITIN) 10 mg tablet Take 10 mg by mouth daily as needed.  calcium carbonate/vitamin D3 (CALCIUM+D PO) Take 1 Tab by mouth every morning.  acetaminophen (TYLENOL EXTRA STRENGTH) 500 mg tablet Take 1,000 mg by mouth two (2) times a day. No current facility-administered medications for this visit.        Social History     Tobacco Use   Smoking Status Never Smoker   Smokeless Tobacco Never Used       Past Medical History:   Diagnosis Date    Arthritis     neck    Depression     Lobular carcinoma in situ of breast 11/01/2012    right    Migraines 07/2018       Past Surgical History:   Procedure Laterality Date    HX BREAST LUMPECTOMY Right 2012    HX CERVICAL FUSION  05/07/2019    HX GI 2021    colonoscopy    HX GYN          HX TONSILLECTOMY         Family History   Problem Relation Age of Onset    Cancer Mother         Pancreatic    Breast Cancer Sister 40    Diabetes Father     Pacemaker Brother     No Known Problems Brother        Social History     Socioeconomic History    Marital status: SINGLE   Tobacco Use    Smoking status: Never Smoker    Smokeless tobacco: Never Used   Vaping Use    Vaping Use: Never used   Substance and Sexual Activity    Alcohol use: No    Drug use: No    Sexual activity: Yes     Partners: Male     Birth control/protection: None       Review of Systems   Eyes: Positive for blurred vision and photophobia. Negative for double vision. Respiratory: Negative for shortness of breath and wheezing. Gastrointestinal: Negative for nausea and vomiting. Musculoskeletal: Positive for neck pain. Neurological: Positive for tingling, sensory change, weakness and headaches. Remainder of comprehensive review is negative. Physical Exam :    Visit Vitals  /64   Pulse 98   Temp 97.3 °F (36.3 °C)   SpO2 99%       General: Well defined, nourished, and groomed individual in no acute distress.    Neck: Supple, nontender, no bruits, no pain with resistance to active range of motion.    Musculoskeletal: Extremities revealed no edema and had full range of motion of joints.    Psych: Good mood and bright affect    NEUROLOGICAL EXAMINATION:    Mental Status: Alert and oriented to person, place, and time    Cranial Nerves:    II, III, IV, VI: Visual acuity grossly intact. Visual fields are normal.    Pupils are equal, round, and reactive to light and accommodation.    Extra-ocular movements are full and fluid. Fundoscopic exam was benign, no ptosis or nystagmus.    V-XII: Hearing is grossly intact. Facial features are symmetric, with normal sensation and strength. The palate rises symmetrically and the tongue protrudes midline. Sternocleidomastoids 5/5. Motor Examination: Normal tone, bulk, and strength, 5/5 muscle strength throughout. Coordination: Finger to nose was normal. No resting or intention tremor    Gait and Station: Steady while walking. Normal arm swing. No pronator drift. No muscle wasting or fasiculations noted. Reflexes: DTRs 2+ throughout. Results for orders placed or performed in visit on 12/06/21   PAP IG, CT-NG, RFX APTIMA HPV ASCUS ([id number], [id number])   Result Value Ref Range    Diagnosis Comment     Specimen adequacy Comment     Clinician provided ICD10 Comment     Performed by: Comment     . Lucius Canchola Note: Comment     Test methodology Comment     . Comment     Chlamydia, Nuc. Acid Amp. Negative Negative    Gonococcus, Nuc. Acid Amp. Negative Negative       No orders of the defined types were placed in this encounter. 1. Migraine   2. Cervicogenic headache       Keep Vyepti for prevention   Will evaluate post next infusion to see how she feels. It has been helping dropping HA from 20 to 10   ubrelvy for PRN rescue. To get with pain management for MARYAM cervical spine which will also help  No changes off BOTOX so she will stay off of this. Keep track and call with changes.            This note will not be viewable in [email or web address]

## 2021-12-28 ENCOUNTER — PATIENT MESSAGE (OUTPATIENT)
Dept: NEUROLOGY | Age: 51
End: 2021-12-28

## 2021-12-28 ENCOUNTER — TELEPHONE (OUTPATIENT)
Dept: NEUROLOGY | Age: 51
End: 2021-12-28

## 2021-12-28 NOTE — TELEPHONE ENCOUNTER
Pt stated the IV infusions she receives at [facility] she wants them to be cancelled due to the side effects she experiences. Says it makes her head hurt more and her leg becomes number.

## 2021-12-28 NOTE — TELEPHONE ENCOUNTER
Spoke with [facility], and explained patient has decided not to take any more infusions.

## 2022-01-03 RX ORDER — UBROGEPANT 50 MG/1
TABLET ORAL
Qty: 10 TABLET | Refills: 5 | Status: SHIPPED | OUTPATIENT
Start: 2022-01-03

## 2022-01-04 ENCOUNTER — TELEPHONE (OUTPATIENT)
Dept: NEUROLOGY | Age: 52
End: 2022-01-04

## 2022-01-20 ENCOUNTER — TELEPHONE (OUTPATIENT)
Dept: NEUROLOGY | Age: 52
End: 2022-01-20

## 2022-01-20 NOTE — TELEPHONE ENCOUNTER
Stella Spine with [employer] is requesting a prior auth for medication. Name of medication was not stated in vm. Please call back.

## 2022-01-21 ENCOUNTER — TELEPHONE (OUTPATIENT)
Dept: NEUROLOGY | Age: 52
End: 2022-01-21

## 2022-01-21 ENCOUNTER — PATIENT MESSAGE (OUTPATIENT)
Dept: NEUROLOGY | Age: 52
End: 2022-01-21

## 2022-01-21 RX ORDER — ATOGEPANT 60 MG/1
1 TABLET ORAL DAILY
Qty: 90 TABLET | Refills: 1 | Status: SHIPPED | OUTPATIENT
Start: 2022-01-21 | End: 2022-01-28 | Stop reason: SDUPTHER

## 2022-01-21 NOTE — PROGRESS NOTES
Qulipta 60 mg   #90/90 days     PA Case: [id number], Status: Approved, Coverage Starts on: 1/21/2022 12:00:00 AM, Coverage Ends on: 4/21/2022 12:00:00 AM.

## 2022-01-28 RX ORDER — ATOGEPANT 60 MG/1
1 TABLET ORAL DAILY
Qty: 90 TABLET | Refills: 1 | Status: SHIPPED | OUTPATIENT
Start: 2022-01-28

## 2022-03-11 ENCOUNTER — OFFICE VISIT (OUTPATIENT)
Dept: NEUROLOGY | Age: 52
End: 2022-03-11
Payer: COMMERCIAL

## 2022-03-11 DIAGNOSIS — M54.2 NECK PAIN: Primary | ICD-10-CM

## 2022-03-11 DIAGNOSIS — G43.909 MIGRAINE WITHOUT STATUS MIGRAINOSUS, NOT INTRACTABLE, UNSPECIFIED MIGRAINE TYPE: ICD-10-CM

## 2022-03-11 DIAGNOSIS — R29.898 RIGHT ARM WEAKNESS: ICD-10-CM

## 2022-03-11 DIAGNOSIS — Z98.1 STATUS POST CERVICAL SPINAL FUSION: ICD-10-CM

## 2022-03-11 PROCEDURE — 99215 OFFICE O/P EST HI 40 MIN: CPT | Performed by: NURSE PRACTITIONER

## 2022-03-11 RX ORDER — SUMATRIPTAN SUCCINATE 11 MG/1
2 CAPSULE NASAL AS NEEDED
Qty: 16 EACH | Refills: 5 | Status: SHIPPED | OUTPATIENT
Start: 2022-03-11

## 2022-03-11 RX ORDER — METHYLPREDNISOLONE 4 MG/1
TABLET ORAL
Qty: 1 DOSE PACK | Refills: 0 | Status: SHIPPED | OUTPATIENT
Start: 2022-03-11 | End: 2022-05-13 | Stop reason: ALTCHOICE

## 2022-03-11 NOTE — PROGRESS NOTES
Jamie Thompson is a 45 y.o. female who presents with the following  Chief Complaint   Patient presents with    Migraine     cervical radiculopathy       HPI     FU for migraine, cervicogenic HA, worsening neck and right arm weakness. She has been having increase of migraine but because has not been taking the Theresa Oumar correctly   Was taking PRN and not daily. She was also using Jeannett Smith   She has not seen any improvement  Having about 10 a month  unilataerl and in the back of the head. Aching pains  Nausea, dizziness, neck pain, visual changes with her headaches. Has nothing else if Jeannett Smith does not help.       She is still having neck pain. Arm weakness, numbness. Still having post surgical pain   Feels like its not getting better either  Feels like a lot of her headaches are coming from this also. Right arm is getting weaker and weaker  Especially with working and lifting and moving. Having increase right hip and leg pain too   She is seeing pain management   No recent MARYAM   Dragging leg at times when tired. No Known Allergies    Current Outpatient Medications   Medication Sig    SUMAtriptan succinate (Onzetra Xsail) 11 mg aepb 2 Puffs by Nasal route as needed (at HA onset. max 2 puffs in 24 hours. ).  methylPREDNISolone (MEDROL DOSEPACK) 4 mg tablet Take as directed    Ubrelvy 50 mg tablet TAKE 1 TABLET BY MOUTH AT ONSET OF HEADACHE, REPEAT IN 2 HOURS IF NEEDED. MAX 2 IN 24 HOURS    gabapentin (NEURONTIN) 800 mg tablet Take 1 Tablet by mouth three (3) times daily.  cholecalciferol, vitamin D3, (VITAMIN D3) 2,000 unit tab Take 1 Tab by mouth every morning.  loratadine (CLARITIN) 10 mg tablet Take 10 mg by mouth daily as needed.  calcium carbonate/vitamin D3 (CALCIUM+D PO) Take 1 Tab by mouth every morning.  acetaminophen (TYLENOL EXTRA STRENGTH) 500 mg tablet Take 1,000 mg by mouth two (2) times a day.  atogepant (Qulipta) 60 mg tab Take 1 Tablet by mouth daily.  PA Case: [id number] XGRBANIN (Patient taking differently: Take 1 Tablet by mouth daily. PA Case: [id number] APPROVED. Not taking daily- not sure it is working. Discussed)     No current facility-administered medications for this visit. Social History     Tobacco Use   Smoking Status Never Smoker   Smokeless Tobacco Never Used       Past Medical History:   Diagnosis Date    Arthritis     neck    Depression     Lobular carcinoma in situ of breast 2012    right    Migraines 2018       Past Surgical History:   Procedure Laterality Date    HX BREAST LUMPECTOMY Right 2012    HX CERVICAL FUSION  2019    HX GI  2021    colonoscopy    HX GYN          HX TONSILLECTOMY  1980       Family History   Problem Relation Age of Onset    Cancer Mother         Pancreatic    Breast Cancer Sister 40    Diabetes Father     Pacemaker Brother     No Known Problems Brother        Social History     Socioeconomic History    Marital status: SINGLE   Tobacco Use    Smoking status: Never Smoker    Smokeless tobacco: Never Used   Vaping Use    Vaping Use: Never used   Substance and Sexual Activity    Alcohol use: No    Drug use: No    Sexual activity: Yes     Partners: Male     Birth control/protection: None       Review of Systems   Eyes: Positive for blurred vision and photophobia. Negative for double vision. Respiratory: Negative for shortness of breath. Gastrointestinal: Positive for nausea. Negative for vomiting. Musculoskeletal: Positive for back pain, joint pain and neck pain. Neurological: Positive for dizziness, tingling, weakness and headaches. Psychiatric/Behavioral: Positive for memory loss. Remainder of comprehensive review is negative. Physical Exam :    There were no vitals taken for this visit.     General: Well defined, nourished, and groomed individual in no acute distress.    Neck: pain with flexion, extension, resistance side to side.     Musculoskeletal: Extremities revealed no edema and had full range of motion of joints.    Psych: Good mood and bright affect    NEUROLOGICAL EXAMINATION:    Mental Status: Alert and oriented to person, place, and time    Cranial Nerves:    II, III, IV, VI: Visual acuity grossly intact. Visual fields are normal.    Pupils are equal, round, and reactive to light and accommodation.    Extra-ocular movements are full and fluid. Fundoscopic exam was benign, no ptosis or nystagmus.    V-XII: Hearing is grossly intact. Facial features are symmetric, with normal sensation and strength. The palate rises symmetrically and the tongue protrudes midline. Sternocleidomastoids 5/5. Motor Examination: Normal tone, bulk    --- 3/5 muscle strength throughout RUE - unable to test  with right hand. Coordination: Finger to nose was normal. No resting or intention tremor with left side. Unable to test RUE due to pain, weakness     Gait and Station: unsteady, shuffled gait. Reflexes: DTRs 3+ throughout RUE , 2+ all else. Results for orders placed or performed in visit on 21   PAP IG, CT-NG, RFX APTIMA HPV ASCUS ([id number], [id number])   Result Value Ref Range    Diagnosis Comment     Specimen adequacy Comment     Clinician provided ICD10 Comment     Performed by: Comment     . James Mccain Note: Comment     Test methodology Comment     . Comment     Chlamydia, Nuc. Acid Amp. Negative Negative    Gonococcus, Nuc. Acid Amp. Negative Negative       Orders Placed This Encounter    MRI CERV SPINE W WO CONT     Standing Status:   Future     Standing Expiration Date:   2023     Order Specific Question:   STAT Creatinine as indicated     Answer:    Yes    REFERRAL TO PHYSICAL THERAPY     Referral Priority:   Routine     Referral Type:   PT/OT/ST     Referral Reason:   Specialty Services Required     Number of Visits Requested:   1    SUMAtriptan succinate (Onzetra Xsail) 11 mg aepb     Si Puffs by Nasal route as needed (at HA onset. max 2 puffs in 24 hours. ). Dispense:  16 Each     Refill:  5    methylPREDNISolone (MEDROL DOSEPACK) 4 mg tablet     Sig: Take as directed     Dispense:  1 Dose Pack     Refill:  0       1. Neck pain    2. Status post cervical spinal fusion    3. Right arm weakness    4. Migraine without status migrainosus, not intractable, unspecified migraine type      Worsening neck symptoms post surgery still. MRI c spine to look at changes, degeneration, herniation   PT to work with neck and right arm weakness. Unable to hold arm up with exam   Needling, traction. Medrol pack to help with acute pain relief. Try Luretha Kava for PRN rescue of migraine   Has Onita Penta and sometimes it does not work   Keep Qulipta daily 60 mg for prevention   If side effects, call and go [place].    Keep out of work to May 11               This note will not be viewable in [address]

## 2022-03-11 NOTE — LETTER
3/11/2022 11:11 AM    Patient:  Su Polk   YOB: 1970  Date of Visit: 3/11/2022      To Whom It May Concern,     Ms. Elle Hutchinson is currently under the care of [facility]. Due to her current condition, it is medically necessary that she be out of work until May 11, 2022 in order for her treatment plan to be implemented. If you have any further questions please contact our office.           Sincerely,      Emy Maria NP

## 2022-03-18 PROBLEM — D21.9 FIBROID: Status: ACTIVE | Noted: 2021-12-06

## 2022-03-19 PROBLEM — Z85.3 PERSONAL HISTORY OF BREAST CANCER: Status: ACTIVE | Noted: 2021-12-06

## 2022-03-19 PROBLEM — R20.0 NUMBNESS AND TINGLING IN BOTH HANDS: Status: ACTIVE | Noted: 2018-08-27

## 2022-03-19 PROBLEM — R20.2 NUMBNESS AND TINGLING IN BOTH HANDS: Status: ACTIVE | Noted: 2018-08-27

## 2022-03-20 PROBLEM — M48.02 CERVICAL STENOSIS OF SPINAL CANAL: Status: ACTIVE | Noted: 2019-05-07

## 2022-03-22 ENCOUNTER — HOSPITAL ENCOUNTER (OUTPATIENT)
Dept: MRI IMAGING | Age: 52
Discharge: HOME | End: 2022-03-22
Attending: NURSE PRACTITIONER
Payer: COMMERCIAL

## 2022-03-22 DIAGNOSIS — Z98.1 STATUS POST CERVICAL SPINAL FUSION: ICD-10-CM

## 2022-03-22 DIAGNOSIS — R29.898 RIGHT ARM WEAKNESS: ICD-10-CM

## 2022-03-22 DIAGNOSIS — M54.2 NECK PAIN: ICD-10-CM

## 2022-03-22 PROCEDURE — 72156 MRI NECK SPINE W/O & W/DYE: CPT

## 2022-03-22 PROCEDURE — 74011250636 HC RX REV CODE- 250/636: Performed by: NURSE PRACTITIONER

## 2022-03-22 PROCEDURE — A9576 INJ PROHANCE MULTIPACK: HCPCS | Performed by: NURSE PRACTITIONER

## 2022-03-22 RX ADMIN — GADOTERIDOL 15 ML: 279.3 INJECTION, SOLUTION INTRAVENOUS at 10:24

## 2022-03-30 ENCOUNTER — TELEPHONE (OUTPATIENT)
Dept: NEUROLOGY | Age: 52
End: 2022-03-30

## 2022-05-05 ENCOUNTER — TELEPHONE (OUTPATIENT)
Dept: NEUROLOGY | Age: 52
End: 2022-05-05

## 2022-05-05 NOTE — TELEPHONE ENCOUNTER
Pt states  has ordered her to go back to work on 5/11/22 but she does not have an appt with him until 5/13. Also states that her PT has not ended as of yet. Pt is requesting a call back to clarify \"leave from work\" orders. Please call back.

## 2022-05-06 ENCOUNTER — PATIENT MESSAGE (OUTPATIENT)
Dept: NEUROLOGY | Age: 52
End: 2022-05-06

## 2022-05-13 ENCOUNTER — OFFICE VISIT (OUTPATIENT)
Dept: NEUROLOGY | Age: 52
End: 2022-05-13
Payer: COMMERCIAL

## 2022-05-13 VITALS
OXYGEN SATURATION: 96 % | TEMPERATURE: 97.4 F | HEART RATE: 73 BPM | SYSTOLIC BLOOD PRESSURE: 134 MMHG | DIASTOLIC BLOOD PRESSURE: 82 MMHG

## 2022-05-13 DIAGNOSIS — Z98.890 NECK PAIN WITH HISTORY OF CERVICAL SPINAL SURGERY: Primary | ICD-10-CM

## 2022-05-13 DIAGNOSIS — M54.81 BILATERAL OCCIPITAL NEURALGIA: ICD-10-CM

## 2022-05-13 DIAGNOSIS — M54.2 NECK PAIN WITH HISTORY OF CERVICAL SPINAL SURGERY: Primary | ICD-10-CM

## 2022-05-13 PROCEDURE — 99214 OFFICE O/P EST MOD 30 MIN: CPT | Performed by: NURSE PRACTITIONER

## 2022-05-13 RX ORDER — ORPHENADRINE CITRATE 100 MG/1
100 TABLET, EXTENDED RELEASE ORAL 2 TIMES DAILY
Qty: 60 TABLET | Refills: 5 | Status: SHIPPED | OUTPATIENT
Start: 2022-05-13

## 2022-05-13 RX ORDER — DICLOFENAC SODIUM 10 MG/G
2-4 GEL TOPICAL 4 TIMES DAILY
Qty: 100 G | Refills: 5 | Status: SHIPPED | OUTPATIENT
Start: 2022-05-13

## 2022-05-13 NOTE — PROGRESS NOTES
Jean Pierre Wakefield is a 45 y.o. female who presents with the following  Chief Complaint   Patient presents with    Migraine     meck pain       HPI        FU for migraine, cervicogenic HA, worsening neck and right arm weakness. Taking Holman Claudine now daily. Seeing some improvement. She was also using Ubrelvy   Having about 10 or less a month    Located unilateral and in the back of the head. Has notch tenderness on both sides at the occipital notch   Aching pains  Nausea, dizziness, neck pain, visual changes with her headaches. Can use Onzetra PRN also      She is still having neck pain. Arm weakness, numbness. Still having post surgical pain   Feels like its not getting better either  Feels like a lot of her headaches are coming from this also.   Right arm is getting weaker and weaker  Especially with working and lifting and moving.      Having increase right hip and leg pain too   She is seeing pain management   No recent MARYAM and to see Dr. Ashlyn Alston soon because other office switching providers. Dragging leg at times when tired.        No Known Allergies    Current Outpatient Medications   Medication Sig    orphenadrine citrate (NORFLEX) 100 mg sr tablet Take 1 Tablet by mouth two (2) times a day.  diclofenac (VOLTAREN) 1 % gel Apply 2-4 g to affected area four (4) times daily. PRN    SUMAtriptan succinate (Onzetra Xsail) 11 mg aepb 2 Puffs by Nasal route as needed (at HA onset. max 2 puffs in 24 hours. ).  atogepant (Qulipta) 60 mg tab Take 1 Tablet by mouth daily. PA Case: [id number] APPROVED (Patient taking differently: Take 1 Tablet by mouth daily. PA Case: [id number] APPROVED. Not taking daily- not sure it is working. Discussed)    Ubrelvy 50 mg tablet TAKE 1 TABLET BY MOUTH AT ONSET OF HEADACHE, REPEAT IN 2 HOURS IF NEEDED. MAX 2 IN 24 HOURS    gabapentin (NEURONTIN) 800 mg tablet Take 1 Tablet by mouth three (3) times daily.     cholecalciferol, vitamin D3, (VITAMIN D3) 2,000 unit tab Take 1 Tab by mouth every morning.  loratadine (CLARITIN) 10 mg tablet Take 10 mg by mouth daily as needed.  calcium carbonate/vitamin D3 (CALCIUM+D PO) Take 1 Tab by mouth every morning.  acetaminophen (TYLENOL EXTRA STRENGTH) 500 mg tablet Take 1,000 mg by mouth two (2) times a day. No current facility-administered medications for this visit. Social History     Tobacco Use   Smoking Status Never Smoker   Smokeless Tobacco Never Used       Past Medical History:   Diagnosis Date    Arthritis     neck    Depression     Lobular carcinoma in situ of breast 2012    right    Migraines 2018       Past Surgical History:   Procedure Laterality Date    HX BREAST LUMPECTOMY Right 2012    HX CERVICAL FUSION  2019    HX GI  2021    colonoscopy    HX GYN          HX TONSILLECTOMY         Family History   Problem Relation Age of Onset    Cancer Mother         Pancreatic    Breast Cancer Sister 40    Diabetes Father     Pacemaker Brother     No Known Problems Brother        Social History     Socioeconomic History    Marital status: SINGLE   Tobacco Use    Smoking status: Never Smoker    Smokeless tobacco: Never Used   Vaping Use    Vaping Use: Never used   Substance and Sexual Activity    Alcohol use: No    Drug use: No    Sexual activity: Yes     Partners: Male     Birth control/protection: None       Review of Systems   Eyes: Positive for blurred vision and photophobia. Negative for double vision. Respiratory: Negative for shortness of breath and wheezing. Cardiovascular: Negative for chest pain and palpitations. Gastrointestinal: Positive for nausea. Negative for vomiting. Musculoskeletal: Positive for neck pain. Neurological: Positive for dizziness, tingling, sensory change, weakness and headaches. Remainder of comprehensive review is negative.      Physical Exam :    Visit Vitals  /82   Pulse 73   Temp 97.4 °F (36.3 °C)   SpO2 96% General: Well defined, nourished, and groomed individual in no acute distress.    Neck: pain with flexion, extension, resistence side to side. Musculoskeletal: Extremities revealed no edema and had full range of motion of joints.    Psych: Good mood and bright affect    NEUROLOGICAL EXAMINATION:    Mental Status: Alert and oriented to person, place, and time    Cranial Nerves:    II, III, IV, VI: Visual acuity grossly intact. Visual fields are normal.    Pupils are equal, round, and reactive to light and accommodation.    Extra-ocular movements are full and fluid. Fundoscopic exam was benign, no ptosis or nystagmus.    V-XII: Hearing is grossly intact. Facial features are symmetric, with normal sensation and strength. The palate rises symmetrically and the tongue protrudes midline. Sternocleidomastoids 5/5. Motor Examination: Normal tone, bulk, and strength, 4/5 muscle strength throughout. Coordination: Finger to nose was normal. No resting or intention tremor    Gait and Station: Steady while walking. Normal arm swing. No pronator drift. No muscle wasting or fasiculations noted. Reflexes: DTRs 2+ throughout. Results for orders placed or performed in visit on 12/06/21   PAP IG, CT-NG, RFX APTIMA HPV ASCUS ([id number], [id number])   Result Value Ref Range    Diagnosis Comment     Specimen adequacy Comment     Clinician provided ICD10 Comment     Performed by: Comment     . Michelle Vaca Note: Comment     Test methodology Comment     . Comment     Chlamydia, Nuc. Acid Amp. Negative Negative    Gonococcus, Nuc. Acid Amp. Negative Negative       Orders Placed This Encounter    REFERRAL TO NEUROLOGY     Referral Priority:   Routine     Referral Type:   Consultation     Referral Reason:   Specialty Services Required     Referred to Provider:   Artie Flores NP     Number of Visits Requested:   1    orphenadrine citrate (NORFLEX) 100 mg sr tablet     Sig: Take 1 Tablet by mouth two (2) times a day. Dispense:  60 Tablet     Refill:  5    diclofenac (VOLTAREN) 1 % gel     Sig: Apply 2-4 g to affected area four (4) times daily. PRN     Dispense:  100 g     Refill:  5       1. Neck pain with history of cervical spinal surgery    2. Bilateral occipital neuralgia        migraines little better with Qulipta daily now  Keep track and using PRN when needed. Likely a lot coming from neck, will be seeing pain management and looking [facility]   Her pain office is changing providers so will see Dr. Aníbal Leung   He is going to do a right hip injection also     Will try Norflex for myofascial pain   Diclofenac gel topically     Has occipital notch tenderness bilaterally upon palpation  Will get an ONB bilaterally to help pain relief.    Keep out of work until 7/20/22            This note will not be viewable in [address]

## 2022-05-23 ENCOUNTER — TELEPHONE (OUTPATIENT)
Dept: NEUROLOGY | Age: 52
End: 2022-05-23

## 2022-05-23 NOTE — TELEPHONE ENCOUNTER
Re: Bree Colindres per 80    [facility] fax from [employer] complete advising of benefits and that PA is needed, created case in [facility] key# [id number]    Awaiting update. [facility] PA approval effective 05/23/22-05/23/23 auth# [id number]. Called pharmacy and left v/m.

## 2022-06-16 ENCOUNTER — TELEPHONE (OUTPATIENT)
Dept: NEUROLOGY | Age: 52
End: 2022-06-16

## 2022-06-16 ENCOUNTER — PATIENT MESSAGE (OUTPATIENT)
Dept: NEUROLOGY | Age: 52
End: 2022-06-16

## 2022-06-16 NOTE — TELEPHONE ENCOUNTER
Re: Antoine Pearson request from nurse about PA, called wally and spoke with [employer]. She advisd cpt [id number] does not require PA.  Call ref# [id number]

## 2022-06-21 ENCOUNTER — OFFICE VISIT (OUTPATIENT)
Dept: NEUROLOGY | Age: 52
End: 2022-06-21
Payer: COMMERCIAL

## 2022-06-21 DIAGNOSIS — M54.81 BILATERAL OCCIPITAL NEURALGIA: ICD-10-CM

## 2022-06-21 DIAGNOSIS — M79.18 MYOFASCIAL PAIN: Primary | ICD-10-CM

## 2022-06-21 PROCEDURE — 64405 NJX AA&/STRD GR OCPL NRV: CPT | Performed by: NURSE PRACTITIONER

## 2022-06-22 NOTE — PROGRESS NOTES
[facility] [place]    OFFICE PROCEDURE NOTE    PROCEDURE: Bilateral Greater Occipital Nerve Block  Date of Procedure: 6/21/2022  CPT Code: 47008 (-48, bilateral)    Time Out performed immediately prior to the start of procedure:  Anais LO NP, have performed the following review on Juanita Mcclain prior to the start of the procedure: Bilateral Greater Occipital Nerve Block. The patient was identified by name and date of birth. Agreement on the procedure to be performed was verified. The potential Benefits and potential Risks were explained to the patient. The procedure site(s) were verified and marked as necessary. Consent was signed and verified. The patient was positioned for comfort. Time: 1500. Date of Procedure: 6/21/2022. Procedure performed by: Anais Junior NP. Provider assisted by: none. Patient assisted by: self. How patient tolerated procedure: mild procedure-related pain, no complications. Comments: none. Medications:   1 mL 4 mg Dexamethasone (per side)  2 mL Bupivacaine 0.5% (per side)    TECHNICAL:  A mixture of 1 ml Dexamethasone and 2.0 mL of Bupivacaine 0.5% was drawn up in sterile fashion in a two separate 3 mL syringes (with 27-gauge needles attached). The patient was placed in the seated position with neck slightly flexed. A site approximately 1/3 the distance from the occipital protuberance to the left mastoid process was palpated, corresponding to the approximate location of the greater occipital nerve. The skin was cleaned with alcohol wipes, and the needle was inserted perpendicularly until contacting bony occiput. The needle was slightly withdrawn. After negative aspiration (no blood), 3.0 mL solution was injected in a fan like distrubution and the needle was withdrawn. The process was repeated on the right side. The injection site was localized between the occipital protuberance and the right mastoid process.   The injection target site was cleaned with alcohol wipes, the needle was inserted perpendicularly, stopping after reaching bony occiput, slightly withdrawn then after negative aspiration (no blood), the 3.0 mL of solution was injected in a fan-like distrubution. The needle was withdrawn. The patient appeared to have tolerated the procedure well without any complications, and was advised to follow up with their referring provider.       Signed By: Maria Luz Spear NP     June 22, 2022

## 2022-07-22 ENCOUNTER — OFFICE VISIT (OUTPATIENT)
Dept: NEUROLOGY | Age: 52
End: 2022-07-22

## 2022-07-22 DIAGNOSIS — M79.18 MYOFASCIAL PAIN: Primary | ICD-10-CM

## 2022-07-27 ENCOUNTER — TELEPHONE (OUTPATIENT)
Dept: NEUROLOGY | Age: 52
End: 2022-07-27

## 2022-07-27 NOTE — TELEPHONE ENCOUNTER
Patient called stating she went back to work on July 22 and She wasn't aware that her job doesn't accept anymore restrictions. She just found that out today. The told her she will have to go back out of work due to her restrictions. Please call to discuss her return to work.

## 2022-08-18 NOTE — PROGRESS NOTES
PT DAILY TREATMENT NOTE - [facility] 2-15    Patient Name: Donnell Bates  Date:10/18/2018  : 1970  [x]  Patient  Verified  Payor: Maikel Lua / Plan: [facility] PPO / Product Type: PPO /    In time: 1100 am  Out time: 1200 pm  Total Treatment Time (min): 60  Total Timed Codes (min): 45  1:1 Treatment Time ( only): 45  Visit #:14    Treatment Area: Neck pain [M54.2]    SUBJECTIVE  Pain Level (0-10 scale): 1/10  Any medication changes, allergies to medications, adverse drug reactions, diagnosis change, or new procedure performed?: [x] No    [] Yes (see summary sheet for update)  Subjective functional status/changes:   [] No changes reported  Pt reports that she is overall doing a little better with less pain.     OBJECTIVE    Modality rationale: decrease pain and increase tissue extensibility to improve the patients ability to perform cervical AROM without pain   Min Type Additional Details    [] Estim: []Att   []Unatt        []TENS instruct                  []IFC  []Premod   []NMES                     []Other:  []w/US   []w/ice   []w/heat  Position: supine  Location: neck    []  Traction: [] Cervical       []Lumbar                       [] Prone          []Supine                       []Intermittent   []Continuous Lbs:  [] before manual  [] after manual  []w/heat    []  Ultrasound: []Continuous   [] Pulsed at:                           []1MHz   []3MHz Location:  W/cm2:    [] Paraffin         Location:   []w/heat   15 []  Ice     [x]  Heat  []  Ice massage Position: supine  Location: neck and chest.    []  Laser  []  Other: Position:  Location:      []  Vasopneumatic Device Pressure:       [] lo [] med [] hi   Temperature:      [x] Skin assessment post-treatment:  [x]intact [x]redness- no adverse reaction    []redness - adverse reaction:     45 min Neuromuscular Re-education:  [x] See flow sheet :   Rationale: increase ROM, increase strength, increase proprioception and posture to improve the patients ability to sit without pain present          With   [x] TE   [] TA   [] neuro   [] other: Patient Education: [x] Review HEP    [] Progressed/Changed HEP based on:   [] positioning   [] body mechanics   [] transfers   [] heat/ice application    [] other:      Other Objective/Functional Measures:   Limited apical expansion present    Pain Level (0-10 scale) post treatment: 2/10    ASSESSMENT/Changes in Function:     Patient will continue to benefit from skilled PT services to modify and progress therapeutic interventions, address functional mobility deficits, address ROM deficits, address strength deficits, analyze and address soft tissue restrictions, analyze and cue movement patterns, analyze and modify body mechanics/ergonomics, assess and modify postural abnormalities and instruct in home and community integration to attain remaining goals. []  See Plan of Care   []  See progress note/recertification  []  See Discharge Summary         Progress towards goals / Updated goals:  Pt tolerated treatment well with benefit noted from breathing instruction. Pt requires mod to max v.c. For proper breathing technique. Will try DN in 2 weeks.     PLAN  [x]  Upgrade activities as tolerated     [x]  Continue plan of care  [x]  Update interventions per flow sheet       []  Discharge due to:_  []  Other:_        José Miguel Hodge PTA, CPT 10/18/2018  3:10 PM Attending with

## 2022-08-19 ENCOUNTER — TELEPHONE (OUTPATIENT)
Dept: NEUROLOGY | Age: 52
End: 2022-08-19

## 2022-09-23 ENCOUNTER — TELEPHONE (OUTPATIENT)
Dept: NEUROLOGY | Age: 52
End: 2022-09-23

## 2022-11-16 ENCOUNTER — TELEPHONE (OUTPATIENT)
Dept: NEUROLOGY | Age: 52
End: 2022-11-16

## 2022-12-01 ENCOUNTER — OFFICE VISIT (OUTPATIENT)
Dept: NEUROLOGY | Age: 52
End: 2022-12-01
Payer: COMMERCIAL

## 2022-12-01 DIAGNOSIS — M79.18 MYOFASCIAL PAIN: ICD-10-CM

## 2022-12-01 DIAGNOSIS — G43.909 MIGRAINE WITHOUT STATUS MIGRAINOSUS, NOT INTRACTABLE, UNSPECIFIED MIGRAINE TYPE: Primary | ICD-10-CM

## 2022-12-01 RX ORDER — ATOGEPANT 60 MG/1
1 TABLET ORAL DAILY
Qty: 90 TABLET | Refills: 1 | Status: SHIPPED | OUTPATIENT
Start: 2022-12-01

## 2022-12-03 NOTE — PROGRESS NOTES
Jenny Posey is a 47 y.o. female who presents with the following  No chief complaint on file. 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 No Known Allergies    Current Outpatient Medications   Medication Sig    atogepant (Qulipta) 60 mg tab Take 1 Tablet by mouth daily. PA Case: [id number] APPROVED    ubrogepant Lethaniel Brow) 50 mg tablet TAKE 1 TABLET BY MOUTH AT ONSET OF HEADACHE, REPEAT IN 2 HOURS IF NEEDED. MAX 2 IN 24 HOURS    orphenadrine citrate (NORFLEX) 100 mg sr tablet Take 1 Tablet by mouth two (2) times a day. diclofenac (VOLTAREN) 1 % gel Apply 2-4 g to affected area four (4) times daily. PRN    SUMAtriptan succinate (Onzetra Xsail) 11 mg aepb 2 Puffs by Nasal route as needed (at HA onset. max 2 puffs in 24 hours. ).    gabapentin (NEURONTIN) 800 mg tablet Take 1 Tablet by mouth three (3) times daily. cholecalciferol, vitamin D3, (VITAMIN D3) 2,000 unit tab Take 1 Tab by mouth every morning. loratadine (CLARITIN) 10 mg tablet Take 10 mg by mouth daily as needed. calcium carbonate/vitamin D3 (CALCIUM+D PO) Take 1 Tab by mouth every morning. acetaminophen (TYLENOL EXTRA STRENGTH) 500 mg tablet Take 1,000 mg by mouth two (2) times a day. No current facility-administered medications for this visit.        Social History     Tobacco Use   Smoking Status Never   Smokeless Tobacco Never       Past Medical History:   Diagnosis Date    Arthritis     neck    Depression     Lobular carcinoma in situ of breast 11/01/2012    right    Migraines 07/2018       Past Surgical History:   Procedure Laterality Date    HX BREAST LUMPECTOMY Right 2012    HX CERVICAL FUSION  2019    HX GI  2021    colonoscopy    HX GYN          HX TONSILLECTOMY  1980       Family History   Problem Relation Age of Onset    Cancer Mother         Pancreatic    Breast Cancer Sister 40    Diabetes Father     Pacemaker Brother     No Known Problems Brother        Social History     Socioeconomic History    Marital status: SINGLE   Tobacco Use    Smoking status: Never    Smokeless tobacco: Never   Vaping Use    Vaping Use: Never used   Substance and Sexual Activity    Alcohol use: No    Drug use: No    Sexual activity: Yes     Partners: Male     Birth control/protection: None       Review of Systems   Eyes:  Positive for blurred vision and photophobia. Negative for double vision. Gastrointestinal:  Positive for nausea and vomiting. Musculoskeletal:  Positive for neck pain. Neurological:  Positive for dizziness and headaches. Remainder of comprehensive review is negative. Physical Exam :    There were no vitals taken for this visit. General: Well defined, nourished, and groomed individual in no acute distress. Musculoskeletal: Extremities revealed no edema and had full range of motion of joints. Psych: Good mood and bright affect    NEUROLOGICAL EXAMINATION:    Mental Status: Alert and oriented to person, place, and time    Cranial Nerves:    II, III, IV, VI: Visual acuity grossly intact. Visual fields are normal.    Pupils are equal, round, and reactive to light and accommodation. Extra-ocular movements are full and fluid. Fundoscopic exam was benign, no ptosis or nystagmus. V-XII: Hearing is grossly intact. Facial features are symmetric, with normal sensation and strength. The palate rises symmetrically and the tongue protrudes midline. Sternocleidomastoids 5/5. Motor Examination: Normal tone, bulk, and strength, 5/5 muscle strength throughout.      Coordination: Finger to nose was normal. No resting or intention tremor    Gait and Station: Steady while walking. Normal arm swing. No pronator drift. No muscle wasting or fasiculations noted. Reflexes: DTRs 2+ throughout. Results for orders placed or performed in visit on 12/06/21   PAP IG, CT-NG, RFX APTIMA HPV ASCUS ([id number], [id number])   Result Value Ref Range    Diagnosis Comment     Specimen adequacy Comment     Clinician provided ICD10 Comment     Performed by: Comment     . Holly Mancia Note: Comment     Test methodology Comment     . Comment     Chlamydia, Nuc. Acid Amp. Negative Negative    Gonococcus, Nuc. Acid Amp. Negative Negative       Orders Placed This Encounter    atogepant (Qulipta) 60 mg tab     Sig: Take 1 Tablet by mouth daily. PA Case: [id number] APPROVED     Dispense:  90 Tablet     Refill:  1    ubrogepant (Ubrelvy) 50 mg tablet     Sig: TAKE 1 TABLET BY MOUTH AT ONSET OF HEADACHE, REPEAT IN 2 HOURS IF NEEDED. MAX 2 IN 24 HOURS     Dispense:  16 Tablet     Refill:  5       1. Migraine without status migrainosus, not intractable, unspecified migraine type    2. Myofascial pain      Continue Qulipta 60 mg daily for migriane prevention   Keep Ubrelvy for PRN rescue of migraine   She wants to avoid the block for now as she felt it did not do much last visit. She is comfortable where things are   Call with any changes.                  This note will not be viewable in [facility]

## 2023-02-02 ENCOUNTER — TELEPHONE (OUTPATIENT)
Dept: NEUROLOGY | Age: 53
End: 2023-02-02

## 2023-02-02 NOTE — TELEPHONE ENCOUNTER
Patient calling about paperwork [facility]) that she dropped off for the NP to complete. The paperwork was supposed be fax over to [facility]. Please call to discuss.

## 2023-02-06 RX ORDER — ORPHENADRINE CITRATE 100 MG/1
TABLET, EXTENDED RELEASE ORAL
Qty: 60 TABLET | Refills: 5 | Status: SHIPPED | OUTPATIENT
Start: 2023-02-06

## 2023-03-27 ENCOUNTER — OFFICE VISIT (OUTPATIENT)
Dept: NEUROLOGY | Age: 53
End: 2023-03-27
Payer: COMMERCIAL

## 2023-03-27 VITALS — DIASTOLIC BLOOD PRESSURE: 88 MMHG | SYSTOLIC BLOOD PRESSURE: 136 MMHG

## 2023-03-27 DIAGNOSIS — G43.909 MIGRAINE WITHOUT STATUS MIGRAINOSUS, NOT INTRACTABLE, UNSPECIFIED MIGRAINE TYPE: Primary | ICD-10-CM

## 2023-03-27 DIAGNOSIS — G62.9 NEUROPATHY: ICD-10-CM

## 2023-03-27 DIAGNOSIS — Z98.890 NECK PAIN WITH HISTORY OF CERVICAL SPINAL SURGERY: ICD-10-CM

## 2023-03-27 DIAGNOSIS — M54.2 NECK PAIN WITH HISTORY OF CERVICAL SPINAL SURGERY: ICD-10-CM

## 2023-03-27 PROCEDURE — 99215 OFFICE O/P EST HI 40 MIN: CPT | Performed by: NURSE PRACTITIONER

## 2023-03-27 NOTE — PROGRESS NOTES
Needing a letter for time off, short term disability  Said right sided tingling still present, said standing and working is triggering pains  MHA have been so-so, weekly would have daily HA

## 2023-03-28 NOTE — PROGRESS NOTES
Malcolm Caldwell is a 47 y.o. female who presents with the following  Chief Complaint   Patient presents with    Follow-up       HPI    Patient comes in for migraines and paresthesia earlier than expected  She was on a 6-month but this is 3 as things have gotten significantly worse here recently  She has been doing fairly well on Qulipta 60 mg for prevention of her migraines  Recently they have acted up a lot  She is also on gabapentin 800 mg 3 times daily  She uses Ubrelvy as needed but its not really doing much here right now  Stress is higher  She has been working still night shift and is doing heavy lifting and very active  This tends to make things worse  She gets terrible sleep  She also takes Norflex for her neck    She has had a cervical fusion by Dr. Oliverio Madden in the past  She has had significant problems ever since  She has paresthesia  Neck pain with any kind of resistance or flexion and extension  She feels like things have gotten a lot worse here recently  No changes in lifting  No falls  She is not sure of any kind of aggravating factor other than work and being on her feet all night as this does make things worse  She is not able to get comfortable during the day  She is not able to really lift much right now  She is weak on exam  She is supposed to get in with pain management at [facility] here soon to get an injection and hopefully this will help  She sees Jhony Blas      No Known Allergies    Current Outpatient Medications   Medication Sig    orphenadrine citrate (NORFLEX) 100 mg sr tablet TAKE 1 TABLET BY MOUTH TWICE DAILY    atogepant (Qulipta) 60 mg tab Take 1 Tablet by mouth daily. PA Case: [id number] APPROVED    ubrogepant Nino Ling) 50 mg tablet TAKE 1 TABLET BY MOUTH AT ONSET OF HEADACHE, REPEAT IN 2 HOURS IF NEEDED. MAX 2 IN 24 HOURS    diclofenac (VOLTAREN) 1 % gel Apply 2-4 g to affected area four (4) times daily.  PRN    SUMAtriptan succinate (Onzetra Xsail) 11 mg aepb 2 Puffs by Nasal route as needed (at HA onset. max 2 puffs in 24 hours. ).    gabapentin (NEURONTIN) 800 mg tablet Take 1 Tablet by mouth three (3) times daily. cholecalciferol, vitamin D3, 50 mcg (2,000 unit) tab Take 1 Tab by mouth every morning. loratadine (CLARITIN) 10 mg tablet Take 10 mg by mouth daily as needed. calcium carbonate/vitamin D3 (CALCIUM+D PO) Take 1 Tab by mouth every morning. acetaminophen (TYLENOL) 500 mg tablet Take 1,000 mg by mouth two (2) times a day. No current facility-administered medications for this visit. Social History     Tobacco Use   Smoking Status Never   Smokeless Tobacco Never       Past Medical History:   Diagnosis Date    Arthritis     neck    Depression     Lobular carcinoma in situ of breast 2012    right    Migraines 2018       Past Surgical History:   Procedure Laterality Date    HX BREAST LUMPECTOMY Right     HX CERVICAL FUSION  2019    HX GI  2021    colonoscopy    HX GYN          HX TONSILLECTOMY  1980       Family History   Problem Relation Age of Onset    Cancer Mother         Pancreatic    Breast Cancer Sister 40    Diabetes Father     Pacemaker Brother     No Known Problems Brother        Social History     Socioeconomic History    Marital status: SINGLE   Tobacco Use    Smoking status: Never    Smokeless tobacco: Never   Vaping Use    Vaping Use: Never used   Substance and Sexual Activity    Alcohol use: No    Drug use: No    Sexual activity: Yes     Partners: Male     Birth control/protection: None       Review of Systems   Eyes:  Positive for blurred vision, double vision and photophobia. Respiratory:  Negative for shortness of breath and wheezing. Cardiovascular:  Negative for chest pain and palpitations. Gastrointestinal:  Negative for nausea and vomiting. Musculoskeletal:  Positive for neck pain. Neurological:  Positive for dizziness, tingling, sensory change, weakness and headaches.  Negative for seizures and loss of consciousness. Remainder of comprehensive review is negative. Physical Exam :    Visit Vitals  /88 (BP 1 Location: Left upper arm, BP Patient Position: Sitting, BP Cuff Size: Adult)       General: Well defined, nourished, and groomed individual in no acute distress. Neck: pain with flexion, extension, resistance    Musculoskeletal: Extremities revealed no edema and had full range of motion of joints. Psych: Good mood and bright affect    NEUROLOGICAL EXAMINATION:    Mental Status: Alert and oriented to person, place, and time    Cranial Nerves:    II, III, IV, VI: Visual acuity grossly intact. Visual fields are normal.    Pupils are equal, round, and reactive to light and accommodation. Extra-ocular movements are full and fluid. Fundoscopic exam was benign, no ptosis or nystagmus. V-XII: Hearing is grossly intact. Facial features are symmetric, with normal sensation and strength. The palate rises symmetrically and the tongue protrudes midline. Sternocleidomastoids 5/5. Motor Examination: Normal tone, bulk, and strength, 3/5 muscle strength throughout. Coordination: Finger to nose was normal. No resting or intention tremor    Gait and Station: Steady while walking. Normal arm swing. No pronator drift. No muscle wasting or fasiculations noted. Reflexes: DTRs 1+ throughout. Neurosensory Exam:  Stocking glove sensory loss to temperature, vibration, and pinprick to the thighs. Results for orders placed or performed in visit on 12/06/21   PAP IG, CT-NG, RFX APTIMA HPV ASCUS ([id number], [id number])   Result Value Ref Range    Diagnosis Comment     Specimen adequacy Comment     Clinician provided ICD10 Comment     Performed by: Comment     . Екатерина Godinez Note: Comment     Test methodology Comment     . Comment     Chlamydia, Nuc. Acid Amp. Negative Negative    Gonococcus, Nuc. Acid Amp.  Negative Negative       Total time: 40 min   Counseling / coordination time: 35 min   > 50% counseling / coordination?: Yes re: medications, treatment, disease process, imaging. No orders of the defined types were placed in this encounter. 1. Migraine without status migrainosus, not intractable, unspecified migraine type    2. Neck pain with history of cervical spinal surgery    3.  Neuropathy      Multiple complaints as seen above  We will take her out of work for pain management to do epidurals and change treatment plan  We did discuss physical therapy and she will call us back after her injection to see if this works  We did discuss continuing to do her exercising at home and stretching  She is on a good regimen of medications  Hopefully coming off of work in the strenuous activity of working night shift will help over the next couple months  We will reassess at her next appointment here in June  If she wants to go back earlier if she feels better we can but right now we will see how things go              This note will not be viewable in [address]

## 2023-05-12 ENCOUNTER — TELEPHONE (OUTPATIENT)
Age: 53
End: 2023-05-12

## 2023-06-19 ENCOUNTER — OFFICE VISIT (OUTPATIENT)
Age: 53
End: 2023-06-19
Payer: COMMERCIAL

## 2023-06-19 VITALS
BODY MASS INDEX: 30.36 KG/M2 | SYSTOLIC BLOOD PRESSURE: 124 MMHG | WEIGHT: 166 LBS | OXYGEN SATURATION: 99 % | HEART RATE: 84 BPM | DIASTOLIC BLOOD PRESSURE: 74 MMHG

## 2023-06-19 DIAGNOSIS — G62.9 POLYNEUROPATHY, UNSPECIFIED: ICD-10-CM

## 2023-06-19 DIAGNOSIS — G43.909 MIGRAINE WITHOUT STATUS MIGRAINOSUS, NOT INTRACTABLE, UNSPECIFIED MIGRAINE TYPE: Primary | ICD-10-CM

## 2023-06-19 PROCEDURE — 99214 OFFICE O/P EST MOD 30 MIN: CPT | Performed by: NURSE PRACTITIONER

## 2023-06-20 NOTE — PROGRESS NOTES
MHA are doing better, using ubrelvy PRN which works can be used with tylenol     neck pains are doing better, rates it at a 4/10 for average pains    Neuropathy is OK, does have on and off pains, relates to movement/pressure on leg triggering pains  ESPERANZA does help
anterior cervical fusion      CT Result (most recent):  No results found for this or any previous visit from the past 3650 days.       EEG Result:      Carotid Doppler:        Recent Labs:  No results found for: WBC, HGB, HCT, MCV, PLT  No results found for: NA, K, CL, CO2, BUN, CREATININE, GLUCOSE, CALCIUM, PROT, LABALBU, BILITOT, ALKPHOS, AST, ALT, LABGLOM, GFRAA, AGRATIO, GLOB    No results found for: CHOL  No results found for: TRIG  No results found for: HDL  No results found for: LDLCHOLESTEROL, LDLCALC  No results found for: LABVLDL, VLDL  No results found for: CHOLHDLRATIO    No results found for: SEDRATE    No results found for: LABA1C  No results found for: GLORIA         Continue her current medications as is  She does see pain management tomorrow and hopefully they will continue to help manage her neck pain and polyneuropathy  We will keep her out for 1 more month to continue with therapy and stretching and exercises and home  She also is still little bit swollen on the left side of her face so we will continue to monitor this through her primary care  We will fill out paperwork and give her a note and then she should be good to go            This note will not be viewable in [facility]

## 2023-06-27 ENCOUNTER — TELEPHONE (OUTPATIENT)
Age: 53
End: 2023-06-27

## 2023-06-27 NOTE — TELEPHONE ENCOUNTER
Patient called about disability paperwork, wanted to inform that her return to work date is [phone]

## 2023-09-21 ENCOUNTER — OFFICE VISIT (OUTPATIENT)
Age: 53
End: 2023-09-21
Payer: COMMERCIAL

## 2023-09-21 VITALS
DIASTOLIC BLOOD PRESSURE: 74 MMHG | SYSTOLIC BLOOD PRESSURE: 124 MMHG | TEMPERATURE: 97.7 F | HEART RATE: 94 BPM | OXYGEN SATURATION: 99 % | RESPIRATION RATE: 16 BRPM

## 2023-09-21 DIAGNOSIS — G43.909 MIGRAINE WITHOUT STATUS MIGRAINOSUS, NOT INTRACTABLE, UNSPECIFIED MIGRAINE TYPE: Primary | ICD-10-CM

## 2023-09-21 DIAGNOSIS — R22.0 FACIAL SWELLING: ICD-10-CM

## 2023-09-21 DIAGNOSIS — H53.9 VISUAL CHANGES: ICD-10-CM

## 2023-09-21 PROCEDURE — 99215 OFFICE O/P EST HI 40 MIN: CPT | Performed by: NURSE PRACTITIONER

## 2023-09-21 RX ORDER — AMOXICILLIN AND CLAVULANATE POTASSIUM 875; 125 MG/1; MG/1
1 TABLET, FILM COATED ORAL 2 TIMES DAILY
Qty: 20 TABLET | Refills: 0 | Status: SHIPPED | OUTPATIENT
Start: 2023-09-21 | End: 2023-10-01

## 2023-09-21 NOTE — PROGRESS NOTES
Марина David is a 47 y.o. female who presents with the following  Chief Complaint   Patient presents with    Migraine     Patient states she has swelling on her forehead, which maybe causing her most recent migraines. HPI       Patient comes in for migraines and paresthesia earlier than expected  She has been doing fairly well on Qulipta 60 mg for prevention of her migraines  Recently they have acted up a lot  She is also on gabapentin 800 mg 3 times daily  She uses Ubrelvy as needed but its not really doing much here right now    She still has forehead and temple swelling from [facility] per her report  Left temple is worse into the side of the head. She has not been able to fix this   Is off this medication   Causing eye strain  Tension type headaches  Not been able to focus much at work or feel like she can effectively do her job     She has paresthesia  Neck pain with any kind of resistance or flexion and extension  She feels like things have gotten a lot worse here recently  No changes in lifting  No falls  She is not sure of any kind of aggravating factor other than work and being on her feet all night as this does make things worse  She is not able to get comfortable during the day  She is not able to really lift much right now  She is weak on exam     No Known Allergies    Current Outpatient Medications   Medication Sig Dispense Refill    amoxicillin-clavulanate (AUGMENTIN) 875-125 MG per tablet Take 1 tablet by mouth 2 times daily for 10 days 20 tablet 0    acetaminophen (TYLENOL) 500 MG tablet Take 2 tablets by mouth 2 times daily      Atogepant (QULIPTA) 60 MG TABS Take 1 tablet by mouth daily      Cholecalciferol 50 MCG (2000 UT) TABS Take 1 tablet by mouth      diclofenac sodium (VOLTAREN) 1 % GEL Apply 2-4 g topically 4 times daily      gabapentin (NEURONTIN) 800 MG tablet Take 1 tablet by mouth 3 times daily.       loratadine (CLARITIN) 10 MG tablet Take 1 tablet by mouth daily as needed

## 2023-09-28 ENCOUNTER — HOSPITAL ENCOUNTER (OUTPATIENT)
Facility: HOSPITAL | Age: 53
Discharge: HOME | End: 2023-09-28
Payer: COMMERCIAL

## 2023-09-28 DIAGNOSIS — R22.0 FACIAL SWELLING: ICD-10-CM

## 2023-09-28 DIAGNOSIS — G43.909 MIGRAINE WITHOUT STATUS MIGRAINOSUS, NOT INTRACTABLE, UNSPECIFIED MIGRAINE TYPE: ICD-10-CM

## 2023-09-28 DIAGNOSIS — H53.9 VISUAL CHANGES: ICD-10-CM

## 2023-09-28 PROCEDURE — 70551 MRI BRAIN STEM W/O DYE: CPT

## 2023-10-02 ENCOUNTER — TELEPHONE (OUTPATIENT)
Age: 53
End: 2023-10-02

## 2023-10-02 NOTE — TELEPHONE ENCOUNTER
Patient requesting a call about having swelling in her face and head. She  has finish taking the antibiotic medication. Requesting another order of antibiotic be called in for her. [employer] over her short tem disability paperwork. She wants to know if it has been received at the office. She need to discuss that also.

## 2023-10-02 NOTE — TELEPHONE ENCOUNTER
Patient called stating that she needs her referral send to [facility] in [place]. [facility] doesn't charge as more.
Referral faxed to [facility] in [facility].
02-Oct-2023 11:08

## 2023-10-05 ENCOUNTER — TELEPHONE (OUTPATIENT)
Age: 53
End: 2023-10-05

## 2023-10-05 NOTE — TELEPHONE ENCOUNTER
Patient would like a call regarding her recent MRI she had done last month. Stated she still has swelling around both eyes, knot towards the back of her head on the left side and another knot on the left side of the forehead. Gene Adas stated the pain level is about a 3. Patient would like to discuss medication.       Please contact

## 2023-10-06 NOTE — TELEPHONE ENCOUNTER
Her MRI was normal of her brain with no acute process or swelling seen     I would go back to her PCP whoever gave her the injection and see if they can help evaluate closer for something else causing

## 2023-10-10 DIAGNOSIS — R22.0 FACIAL SWELLING: Primary | ICD-10-CM

## 2023-10-10 NOTE — TELEPHONE ENCOUNTER
I am not sure. We never order IV antobiotics so I am unsure as to how to do that   We can order a CT of the face and soft tissue to see if something is going on.    Just ordered it

## 2023-10-16 ENCOUNTER — TELEPHONE (OUTPATIENT)
Age: 53
End: 2023-10-16

## 2023-10-18 ENCOUNTER — HOSPITAL ENCOUNTER (OUTPATIENT)
Facility: HOSPITAL | Age: 53
Discharge: HOME | End: 2023-10-21
Payer: COMMERCIAL

## 2023-10-18 DIAGNOSIS — R22.0 FACIAL SWELLING: ICD-10-CM

## 2023-10-18 PROCEDURE — 70486 CT MAXILLOFACIAL W/O DYE: CPT

## 2023-10-26 NOTE — TELEPHONE ENCOUNTER
Calling to see if her Disability Paperwork has been received. She stated they are due back by 11-6-23    Please call to discuss .

## 2023-11-01 NOTE — TELEPHONE ENCOUNTER
Sent email to [employer] re: Mikel Nettles and Brittany Parks for PA activity - if she can set up on [employer] due to [facility] website is down.

## 2023-11-02 ENCOUNTER — TELEPHONE (OUTPATIENT)
Age: 53
End: 2023-11-02

## 2023-11-02 NOTE — TELEPHONE ENCOUNTER
Patient is looking to see if getting a sooner appt before 12/28 would be possible? States she is scheduled to return back to work on 12/28 and need to be seen and given a letter that states she is good to go back to work.    Please contact to advise.

## 2023-11-06 NOTE — TELEPHONE ENCOUNTER
Patient came to the office to see if she can get an appointment before 12/28. She's supposed to go back to work on 12/28. She'd like a call back.

## 2023-11-14 ENCOUNTER — OFFICE VISIT (OUTPATIENT)
Age: 53
End: 2023-11-14
Payer: COMMERCIAL

## 2023-11-14 VITALS
BODY MASS INDEX: 34.6 KG/M2 | WEIGHT: 188 LBS | HEIGHT: 62 IN | SYSTOLIC BLOOD PRESSURE: 118 MMHG | DIASTOLIC BLOOD PRESSURE: 76 MMHG

## 2023-11-14 DIAGNOSIS — Z85.3 PERSONAL HISTORY OF BREAST CANCER: ICD-10-CM

## 2023-11-14 DIAGNOSIS — D21.9 FIBROID: Primary | ICD-10-CM

## 2023-11-14 DIAGNOSIS — N95.1 MENOPAUSAL SYNDROME: ICD-10-CM

## 2023-11-14 PROCEDURE — 99213 OFFICE O/P EST LOW 20 MIN: CPT | Performed by: OBSTETRICS & GYNECOLOGY

## 2023-11-14 ASSESSMENT — ENCOUNTER SYMPTOMS
RESPIRATORY NEGATIVE: 1
GASTROINTESTINAL NEGATIVE: 1

## 2023-11-15 NOTE — PROGRESS NOTES
Nisha Cárdenas is a No obstetric history on file. , 46 y.o. female   No LMP recorded. (Menstrual status: Menopause). She presents for her problem    She is having no significant problems. Had DEXA and on study was noted to have an enlarged uterus    Pt without any  bleeding  Does note some urinary frequency  Has known history of fibroids      Menstrual status:  Cycles are menopausal.    Flow: absent. She does not have dysmenorrhea. Medical conditions:  Since her last annual GYN exam about  ? years ago, she has not the following changes in her health history: none. Mammogram History:    MENDOZA Results (most recent):  @[facility]([id number]:1)@     DEXA Results (most recent):  @[facility]([id number]:1)@       Past Medical History:   Diagnosis Date    Arthritis     neck    Depression     Lobular carcinoma in situ of breast 2012    right    Migraines 2018     Past Surgical History:   Procedure Laterality Date    BREAST LUMPECTOMY Right 2012    CERVICAL FUSION  2019    GI  2021    colonoscopy    GYN          TONSILLECTOMY         Prior to Admission medications    Medication Sig Start Date End Date Taking? Authorizing Provider   acetaminophen (TYLENOL) 500 MG tablet Take 2 tablets by mouth 2 times daily   Yes Automatic Reconciliation, Ar   Atogepant (QULIPTA) 60 MG TABS Take 1 tablet by mouth daily 22  Yes Automatic Reconciliation, Ar   Cholecalciferol 50 MCG (2000) TABS Take 1 tablet by mouth   Yes Automatic Reconciliation, Ar   diclofenac sodium (VOLTAREN) 1 % GEL Apply 2-4 g topically 4 times daily 22  Yes Automatic Reconciliation, Ar   gabapentin (NEURONTIN) 800 MG tablet Take 1 tablet by mouth 3 times daily.  21  Yes Automatic Reconciliation, Ar   loratadine (CLARITIN) 10 MG tablet Take 1 tablet by mouth daily as needed   Yes Automatic Reconciliation, Ar   orphenadrine (NORFLEX) 100 MG extended release tablet Take 1 tablet by mouth 2 times daily

## 2023-11-28 DIAGNOSIS — M54.2 CERVICALGIA: Primary | ICD-10-CM

## 2023-11-28 NOTE — TELEPHONE ENCOUNTER
Patient is requesting for an order to be put in for PT for her head and neck. Would like it to be sent to [facility]\". P- [phone]    Patient is also requesting a refill on her medication Nichole Montanez). Please contact to discuss if needed.

## 2023-12-05 ENCOUNTER — TELEPHONE (OUTPATIENT)
Age: 53
End: 2023-12-05

## 2023-12-07 ENCOUNTER — OFFICE VISIT (OUTPATIENT)
Age: 53
End: 2023-12-07
Payer: COMMERCIAL

## 2023-12-07 VITALS
SYSTOLIC BLOOD PRESSURE: 130 MMHG | HEIGHT: 62 IN | TEMPERATURE: 96.6 F | BODY MASS INDEX: 34.39 KG/M2 | DIASTOLIC BLOOD PRESSURE: 70 MMHG | HEART RATE: 93 BPM | OXYGEN SATURATION: 100 %

## 2023-12-07 DIAGNOSIS — M54.2 CERVICALGIA: Primary | ICD-10-CM

## 2023-12-07 DIAGNOSIS — M54.12 RADICULOPATHY, CERVICAL REGION: ICD-10-CM

## 2023-12-07 DIAGNOSIS — G43.711 CHRONIC MIGRAINE WITHOUT AURA, INTRACTABLE, WITH STATUS MIGRAINOSUS: ICD-10-CM

## 2023-12-07 PROCEDURE — 99215 OFFICE O/P EST HI 40 MIN: CPT | Performed by: NURSE PRACTITIONER

## 2023-12-07 NOTE — PROGRESS NOTES
arms  She is weak on exam so we do need to get an MRI of the cervical spine to evaluate surgical site and above and below  X-ray showed some degenerative changes in other areas so we need to reevaluate for possible epidural steroid versus surgical intervention  Continue the Carina Truong for preventative of migraines  Continue the Ubrelvy as needed  Continue gabapentin 800 mg 3 times daily    We will keep her out of work now until January 29, 2024            This note will not be viewable in [address] [place] [address]

## 2023-12-21 ENCOUNTER — HOSPITAL ENCOUNTER (OUTPATIENT)
Facility: HOSPITAL | Age: 53
Discharge: HOME | End: 2023-12-24
Payer: COMMERCIAL

## 2023-12-21 DIAGNOSIS — M54.2 CERVICALGIA: ICD-10-CM

## 2023-12-21 DIAGNOSIS — M54.12 RADICULOPATHY, CERVICAL REGION: ICD-10-CM

## 2023-12-21 PROCEDURE — 72141 MRI NECK SPINE W/O DYE: CPT

## 2023-12-27 ENCOUNTER — OFFICE VISIT (OUTPATIENT)
Age: 53
End: 2023-12-27
Payer: COMMERCIAL

## 2023-12-27 VITALS
HEIGHT: 62 IN | WEIGHT: 187.25 LBS | SYSTOLIC BLOOD PRESSURE: 128 MMHG | DIASTOLIC BLOOD PRESSURE: 74 MMHG | BODY MASS INDEX: 34.46 KG/M2

## 2023-12-27 DIAGNOSIS — Z12.31 SCREENING MAMMOGRAM FOR BREAST CANCER: ICD-10-CM

## 2023-12-27 DIAGNOSIS — D21.9 FIBROID: ICD-10-CM

## 2023-12-27 DIAGNOSIS — Z12.4 PAP SMEAR FOR CERVICAL CANCER SCREENING: Primary | ICD-10-CM

## 2023-12-27 DIAGNOSIS — N95.1 MENOPAUSAL SYNDROME: ICD-10-CM

## 2023-12-27 DIAGNOSIS — Z01.419 GYNECOLOGIC EXAM NORMAL: ICD-10-CM

## 2023-12-27 PROCEDURE — 99396 PREV VISIT EST AGE 40-64: CPT | Performed by: OBSTETRICS & GYNECOLOGY

## 2023-12-27 NOTE — PROGRESS NOTES
Марина David is a No obstetric history on file. , 46 y.o. female   No LMP recorded. (Menstrual status: Menopause). She presents for her annual    She is having no significant problems. Menstrual status:  Cycles are menopausal.    Flow: absent. She does not have dysmenorrhea. Medical conditions:  Since her last annual GYN exam about one year ago, she has not the following changes in her health history: none. Mammogram History:    MENDOZA Results (most recent):  @[facility]([id number]:1)@     DEXA Results (most recent[facility]  [facility][id number]       Past Medical History:   Diagnosis Date    Arthritis     neck    Depression     Lobular carcinoma in situ of breast 2012    right    Menopause     Migraines 2018     Past Surgical History:   Procedure Laterality Date    BREAST LUMPECTOMY Right 2012    CERVICAL FUSION  2019    GI  2021    colonoscopy    GYN          TONSILLECTOMY         Prior to Admission medications    Medication Sig Start Date End Date Taking? Authorizing Provider   Ubrogepant 50 MG TABS TAKE 1 TABLET BY MOUTH AT ONSET OF HEADACHE, REPEAT IN 2 HOURS IF NEEDED. MAX 2 IN 24 HOURS 23  Yes Bonilla Quintanilla Ra, APRN - NP   acetaminophen (TYLENOL) 500 MG tablet Take 2 tablets by mouth 2 times daily   Yes Automatic Reconciliation, Ar   Atogepant (QULIPTA) 60 MG TABS Take 1 tablet by mouth daily 22  Yes Automatic Reconciliation, Ar   Cholecalciferol 50 MCG ( UT) TABS Take 1 tablet by mouth   Yes Automatic Reconciliation, Ar   diclofenac sodium (VOLTAREN) 1 % GEL Apply 2-4 g topically 4 times daily 22  Yes Automatic Reconciliation, Ar   gabapentin (NEURONTIN) 800 MG tablet Take 1 tablet by mouth 3 times daily.  21  Yes Automatic Reconciliation, Ar   loratadine (CLARITIN) 10 MG tablet Take 1 tablet by mouth daily as needed   Yes Automatic Reconciliation, Ar   orphenadrine (NORFLEX) 100 MG extended release tablet Take 1 tablet by

## 2023-12-29 LAB
., LABCORP: NORMAL
CYTOLOGIST CVX/VAG CYTO: NORMAL
CYTOLOGY CVX/VAG DOC CYTO: NORMAL
CYTOLOGY CVX/VAG DOC THIN PREP: NORMAL
DX ICD CODE: NORMAL
Lab: NORMAL
OTHER STN SPEC: NORMAL
STAT OF ADQ CVX/VAG CYTO-IMP: NORMAL

## 2024-01-04 ENCOUNTER — TELEPHONE (OUTPATIENT)
Age: 54
End: 2024-01-04

## 2024-01-04 NOTE — TELEPHONE ENCOUNTER
Patient requesting a call to discuss being released to return to work on 1/8/24 due to insurance purposes.

## 2024-01-15 ENCOUNTER — OFFICE VISIT (OUTPATIENT)
Age: 54
End: 2024-01-15
Payer: COMMERCIAL

## 2024-01-15 VITALS
SYSTOLIC BLOOD PRESSURE: 154 MMHG | OXYGEN SATURATION: 100 % | HEART RATE: 92 BPM | DIASTOLIC BLOOD PRESSURE: 80 MMHG | RESPIRATION RATE: 18 BRPM

## 2024-01-15 DIAGNOSIS — G43.711 CHRONIC MIGRAINE WITHOUT AURA, INTRACTABLE, WITH STATUS MIGRAINOSUS: Primary | ICD-10-CM

## 2024-01-15 PROCEDURE — 99214 OFFICE O/P EST MOD 30 MIN: CPT | Performed by: NURSE PRACTITIONER

## 2024-01-15 ASSESSMENT — PATIENT HEALTH QUESTIONNAIRE - PHQ9
SUM OF ALL RESPONSES TO PHQ QUESTIONS 1-9: 0
2. FEELING DOWN, DEPRESSED OR HOPELESS: 0
SUM OF ALL RESPONSES TO PHQ QUESTIONS 1-9: 0
1. LITTLE INTEREST OR PLEASURE IN DOING THINGS: 0
SUM OF ALL RESPONSES TO PHQ QUESTIONS 1-9: 0
SUM OF ALL RESPONSES TO PHQ9 QUESTIONS 1 & 2: 0
SUM OF ALL RESPONSES TO PHQ QUESTIONS 1-9: 0

## 2024-01-15 NOTE — PROGRESS NOTES
Melissa Garcia is a 53 y.o. female who presents with the following  Chief Complaint   Patient presents with    Follow-up     Patient is here following up for head neck and right leg pain.  Patient reports that she is still having the head pain everyday. The right leg and her neck are okay for right now. Head pain is about a level 2.        HPI      Patient comes in for migraines and paresthesia  Currently on Qulipta 60 mg for prevention of her migraines  She is also on gabapentin 800 mg 3 times daily  She uses Ubrelvy as needed.   Needs a refill. and a new PA   She has failed Imitrex, Maxalt, Zomig.      Her migraines are unilateral or normally located on the head  Sharp stabbing pain  Hypersensitivity light sound and smell  She has about 6 a month     She has paresthesia  Neck pain with any kind of resistance or flexion and extension  She feels like things have gotten a lot worse here recently  No changes in lifting  No falls  She is not sure of any kind of aggravating factor other than work and being on her feet all night as this does make things worse  She is not able to get comfortable during the day  She is not able to really lift much right now  She is weak on exam       No Known Allergies    Current Outpatient Medications   Medication Sig Dispense Refill    Ubrogepant 100 MG TABS Take 100 mg by mouth once Max dose one daily      acetaminophen (TYLENOL) 500 MG tablet Take 2 tablets by mouth 2 times daily      Atogepant (QULIPTA) 60 MG TABS Take 1 tablet by mouth daily      Cholecalciferol 50 MCG (2000 UT) TABS Take 1 tablet by mouth      diclofenac sodium (VOLTAREN) 1 % GEL Apply 2-4 g topically 4 times daily      gabapentin (NEURONTIN) 800 MG tablet Take 1 tablet by mouth 3 times daily.      loratadine (CLARITIN) 10 MG tablet Take 1 tablet by mouth daily as needed      orphenadrine (NORFLEX) 100 MG extended release tablet Take 1 tablet by mouth 2 times daily      Ubrogepant 50 MG TABS TAKE 1 TABLET BY

## 2024-02-21 NOTE — TELEPHONE ENCOUNTER
Re: Ubrelvy    Created case in [facility] Key# [id number], submitted and awaiting outcome.     06/03/24: Follow up on old encounter, Per review PA denied. Case# [id number], per chart review PA is now approved.

## 2024-02-25 NOTE — PROGRESS NOTES
Ronel Davies is a 47 y.o. female who presents with the following  Chief Complaint   Patient presents with    Follow-up    Migraine    Shoulder Pain       HPI        Patient comes in for a follow up for Botox. Still feels like things are going well with this and she is still having migraines but not as frequent. She is noticing they are cut down by a few a month so far. She does feel like they are still coming from her neck surgical site and right shoulder. She is still getting PT on the leg and shoulder. Feels like this is working for the most part. 2 X a week now. She never got the [place] [facility] [place]. she has failed multiple rescues. 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                        No Known Allergies    Current Outpatient Medications   Medication Sig    gabapentin (NEURONTIN) 800 mg tablet TK 1 T PO TID    ubrogepant (Ubrelvy) 50 mg tablet 1 at HA onset and repeat in 2 hours if needed. Max 2 in 24 hours    onabotulinumtoxinA (BOTOX) 200 unit injection Inject 155 units IM into 31 FDA approved sites head/neck every 12 weeks    OTHER Ubrelvy 50 mg tablets. Take 1 tablet at headache onset. May repeat in 2 hours X 1 dose. Max 2 doses in 24 hours.  onabotulinumtoxinA (BOTOX) 200 unit injection Inject 155 units IM into 31 FDA approved sites head/neck every 3 months    onabotulinumtoxinA (BOTOX) 200 unit injection Inject 155 units IM into head, neck, face every 3 months  for chronic migraine    erenumab-aooe (AIMOVIG AUTOINJECTOR) 70 mg/mL injection 1 mL by SubCUTAneous route every thirty (30) days.  cholecalciferol, vitamin D3, (VITAMIN D3) 2,000 unit tab Take 1 Tab by mouth every morning.  loratadine (CLARITIN) 10 mg tablet Take 10 mg by mouth daily as needed.  baclofen (LIORESAL) 10 mg tablet Take 1 Tab by mouth three (3) times daily.  calcium carbonate/vitamin D3 (CALCIUM+D PO) Take 1 Tab by mouth every morning.     acetaminophen (TYLENOL EXTRA STRENGTH) 500 mg tablet Take 1,000 mg by mouth two (2) times a day.  methocarbamol (ROBAXIN) 750 mg tablet Take 750 mg by mouth as needed. No current facility-administered medications for this visit. Social History     Tobacco Use   Smoking Status Never Smoker   Smokeless Tobacco Never Used       Past Medical History:   Diagnosis Date    Arthritis     neck    Depression     Lobular carcinoma in situ of breast 2012    right    Migraines 2018       Past Surgical History:   Procedure Laterality Date    HX BREAST LUMPECTOMY Right 2012    HX CERVICAL FUSION  2019    HX GYN          HX TONSILLECTOMY  1980       Family History   Problem Relation Age of Onset    Cancer Mother         Pancreatic    Breast Cancer Sister 40    Diabetes Father     Pacemaker Brother     No Known Problems Brother        Social History     Socioeconomic History    Marital status: SINGLE     Spouse name: Not on file    Number of children: Not on file    Years of education: Not on file    Highest education level: Not on file   Tobacco Use    Smoking status: Never Smoker    Smokeless tobacco: Never Used   Substance and Sexual Activity    Alcohol use: No    Drug use: No    Sexual activity: Yes     Partners: Male       Review of Systems   Eyes: Positive for blurred vision, double vision and photophobia. Cardiovascular: Negative for chest pain and palpitations. Musculoskeletal: Positive for back pain, joint pain and neck pain. Negative for falls. Neurological: Positive for dizziness, tingling, sensory change, weakness and headaches. Negative for tremors, speech change and focal weakness. Remainder of comprehensive review is negative.      Physical Exam :    Visit Vitals  /70   Pulse 69   Temp 97.5 °F (36.4 °C) (Oral)   Ht 5' 3\" (1.6 m)   SpO2 99%   BMI 31.66 kg/m²       General: Well defined, nourished, and groomed individual in no acute distress.    Musculoskeletal: Extremities revealed no edema and had full range of motion of joints.    Psych: Good mood and bright affect    NEUROLOGICAL EXAMINATION:    Mental Status: Alert and oriented to person, place, and time    Cranial Nerves:    II, III, IV, VI: Visual acuity grossly intact. Visual fields are normal.    Pupils are equal, round, and reactive to light and accommodation.    Extra-ocular movements are full and fluid. Fundoscopic exam was benign, no ptosis or nystagmus.    V-XII: Hearing is grossly intact. Facial features are symmetric, with normal sensation and strength. The palate rises symmetrically and the tongue protrudes midline. Sternocleidomastoids 5/5. Motor Examination: Normal tone, bulk, and strength, 4/5 muscle strength throughout LE and UE     Coordination: Finger to nose was normal. No resting or intention tremor    Gait and Station: Steady while walking. Normal arm swing. No pronator drift. No muscle wasting or fasiculations noted. Reflexes: DTRs 3+ throughout. Results for orders placed or performed during the hospital encounter of    METABOLIC PANEL, BASIC   Result Value Ref Range    Sodium 142 136 - 145 mmol/L    Potassium 3.9 3.5 - 5.1 mmol/L    Chloride 107 97 - 108 mmol/L    CO2 28 21 - 32 mmol/L    Anion gap 7 5 - 15 mmol/L    Glucose 101 (H) 65 - 100 mg/dL    BUN 13 6 - 20 MG/DL    Creatinine 0.76 0.55 - 1.02 MG/DL    BUN/Creatinine ratio 17 12 - 20      GFR est AA >60 >60 ml/min/1.73m2    GFR est non-AA >60 >60 ml/min/1.73m2    Calcium 8.4 (L) 8.5 - 10.1 MG/DL   HEMOGLOBIN   Result Value Ref Range    HGB 11.0 (L) 11.5 - 16.0 g/dL   HCG URINE, QL. - POC   Result Value Ref Range    Pregnancy test,urine (POC) NEGATIVE  NEG         Orders Placed This Encounter    gabapentin (NEURONTIN) 800 mg tablet     Sig: TK 1 T PO TID    ubrogepant (Ubrelvy) 50 mg tablet     Si at HA onset and repeat in 2 hours if needed.   Max 2 in 24 hours     Dispense:  10 Tab     Refill:  5     BIN: [id number] PCN:[facility]:[id number] ID# [id number]-- PLEASE USE THIS CARD TO RUN       No diagnosis found. Keep current medications as is. Botox has really helped migraine prevention so we will keep this. Cut them down by half to 10 a month. Keep Gabapentin. Try Ubrelvy 50 mg tablets for PRN rescue. Failed triptan and fioricet. Continue PT.               This note will not be viewable in [facility]

## 2024-02-26 ENCOUNTER — TELEPHONE (OUTPATIENT)
Age: 54
End: 2024-02-26

## 2024-02-26 NOTE — TELEPHONE ENCOUNTER
Ubrelvy Approved    PA Case: [id number], Status: Approved, Coverage Starts on: 2/26/2024   Coverage Ends on: 2/25/2025    Will scan approval to chart upon receipt.    Fyi to nurse.    ____________________________________  RE: Ubrelvy Reconsideration    Key: [id number]    Awaiting outcome

## 2024-03-06 RX ORDER — ORPHENADRINE CITRATE 100 MG/1
100 TABLET, EXTENDED RELEASE ORAL 2 TIMES DAILY
Qty: 60 TABLET | Refills: 5 | Status: SHIPPED | OUTPATIENT
Start: 2024-03-06

## 2024-03-08 ENCOUNTER — OFFICE VISIT (OUTPATIENT)
Age: 54
End: 2024-03-08
Payer: COMMERCIAL

## 2024-03-08 VITALS
HEART RATE: 77 BPM | RESPIRATION RATE: 16 BRPM | SYSTOLIC BLOOD PRESSURE: 126 MMHG | DIASTOLIC BLOOD PRESSURE: 68 MMHG | OXYGEN SATURATION: 100 %

## 2024-03-08 DIAGNOSIS — G43.711 CHRONIC MIGRAINE WITHOUT AURA, INTRACTABLE, WITH STATUS MIGRAINOSUS: Primary | ICD-10-CM

## 2024-03-08 DIAGNOSIS — M54.12 RADICULOPATHY, CERVICAL REGION: ICD-10-CM

## 2024-03-08 PROCEDURE — 99214 OFFICE O/P EST MOD 30 MIN: CPT | Performed by: NURSE PRACTITIONER

## 2024-03-08 ASSESSMENT — PATIENT HEALTH QUESTIONNAIRE - PHQ9
2. FEELING DOWN, DEPRESSED OR HOPELESS: 0
SUM OF ALL RESPONSES TO PHQ QUESTIONS 1-9: 0
1. LITTLE INTEREST OR PLEASURE IN DOING THINGS: 0
SUM OF ALL RESPONSES TO PHQ QUESTIONS 1-9: 0
SUM OF ALL RESPONSES TO PHQ9 QUESTIONS 1 & 2: 0
SUM OF ALL RESPONSES TO PHQ QUESTIONS 1-9: 0
SUM OF ALL RESPONSES TO PHQ QUESTIONS 1-9: 0

## 2024-03-08 NOTE — PROGRESS NOTES
Melissa Garcia is a 53 y.o. female who presents with the following  Chief Complaint   Patient presents with    Follow-up    Migraine     Patient reports that she is having daily headaches. The PT has helped some.        HPI    FU for migraines.   Currently on Qulipta 60 mg for prevention of her migraines  She is also on gabapentin 800 mg 3 times daily  She uses Ubrelvy as needed.   Needs a refill  and a new PA   She has failed Imitrex, Maxalt, Zomig, Nurtec.      Her migraines are unilateral or normally located on the head  Sharp stabbing pain  Hypersensitivity light sound and smell  She has about 6 a month     She has paresthesia  Neck pain with any kind of resistance or flexion and extension  She feels like things have gotten a lot worse here recently  No changes in lifting  No falls  She is not sure of any kind of aggravating factor other than work and being on her feet all night as this does make things worse  She is not able to get comfortable during the day  She is not able to really lift much right now  She is weak on exam still but better.       No Known Allergies    Current Outpatient Medications   Medication Sig Dispense Refill    orphenadrine (NORFLEX) 100 MG extended release tablet TAKE 1 TABLET BY MOUTH TWICE DAILY 60 tablet 5    acetaminophen (TYLENOL) 500 MG tablet Take 2 tablets by mouth 2 times daily      Atogepant (QULIPTA) 60 MG TABS Take 1 tablet by mouth daily      Cholecalciferol 50 MCG (2000 UT) TABS Take 1 tablet by mouth      diclofenac sodium (VOLTAREN) 1 % GEL Apply 2-4 g topically 4 times daily      gabapentin (NEURONTIN) 800 MG tablet Take 1 tablet by mouth 3 times daily.      loratadine (CLARITIN) 10 MG tablet Take 1 tablet by mouth daily as needed       No current facility-administered medications for this visit.        Social History     Tobacco Use   Smoking Status Never   Smokeless Tobacco Never       Past Medical History:   Diagnosis Date    Arthritis     neck    Depression

## 2024-09-09 ENCOUNTER — OFFICE VISIT (OUTPATIENT)
Age: 54
End: 2024-09-09
Payer: COMMERCIAL

## 2024-09-09 VITALS
SYSTOLIC BLOOD PRESSURE: 138 MMHG | DIASTOLIC BLOOD PRESSURE: 72 MMHG | RESPIRATION RATE: 18 BRPM | HEART RATE: 86 BPM | OXYGEN SATURATION: 99 %

## 2024-09-09 DIAGNOSIS — G43.711 CHRONIC MIGRAINE WITHOUT AURA, INTRACTABLE, WITH STATUS MIGRAINOSUS: ICD-10-CM

## 2024-09-09 DIAGNOSIS — M54.12 RADICULOPATHY, CERVICAL REGION: Primary | ICD-10-CM

## 2024-09-09 PROCEDURE — 99214 OFFICE O/P EST MOD 30 MIN: CPT | Performed by: NURSE PRACTITIONER

## 2024-12-04 RX ORDER — ORPHENADRINE CITRATE 100 MG/1
100 TABLET ORAL 2 TIMES DAILY
Qty: 60 TABLET | Refills: 5 | Status: SHIPPED | OUTPATIENT
Start: 2024-12-04

## 2025-02-10 ENCOUNTER — TELEPHONE (OUTPATIENT)
Age: 55
End: 2025-02-10

## 2025-02-10 DIAGNOSIS — M54.12 RADICULOPATHY, CERVICAL REGION: Primary | ICD-10-CM

## 2025-02-10 NOTE — TELEPHONE ENCOUNTER
Patient is requesting a refill of her gabapentin.  I don't see that Elpidio wrote for this.  I told her I would send it over anyway for his review.    Thanks so much!

## 2025-02-11 RX ORDER — GABAPENTIN 800 MG/1
800 TABLET ORAL 3 TIMES DAILY
Qty: 90 TABLET | Refills: 2 | Status: SHIPPED | OUTPATIENT
Start: 2025-02-11 | End: 2025-03-13

## 2025-03-25 ENCOUNTER — OFFICE VISIT (OUTPATIENT)
Age: 55
End: 2025-03-25
Payer: COMMERCIAL

## 2025-03-25 VITALS
SYSTOLIC BLOOD PRESSURE: 122 MMHG | HEART RATE: 70 BPM | DIASTOLIC BLOOD PRESSURE: 78 MMHG | RESPIRATION RATE: 18 BRPM | TEMPERATURE: 98.2 F | OXYGEN SATURATION: 98 %

## 2025-03-25 DIAGNOSIS — G43.711 CHRONIC MIGRAINE WITHOUT AURA, INTRACTABLE, WITH STATUS MIGRAINOSUS: Primary | ICD-10-CM

## 2025-03-25 DIAGNOSIS — M43.22 FUSION OF SPINE OF CERVICAL REGION: ICD-10-CM

## 2025-03-25 DIAGNOSIS — H53.9 VISUAL CHANGES: ICD-10-CM

## 2025-03-25 DIAGNOSIS — M79.601 RIGHT ARM PAIN: ICD-10-CM

## 2025-03-25 DIAGNOSIS — R20.2 PARESTHESIA: ICD-10-CM

## 2025-03-25 PROCEDURE — 99215 OFFICE O/P EST HI 40 MIN: CPT | Performed by: NURSE PRACTITIONER

## 2025-03-25 RX ORDER — UBROGEPANT 100 MG/1
TABLET ORAL
Qty: 16 TABLET | Refills: 4 | Status: ACTIVE | OUTPATIENT
Start: 2025-03-25

## 2025-03-25 RX ORDER — ATOGEPANT 60 MG/1
1 TABLET ORAL DAILY
Qty: 90 TABLET | Refills: 1 | Status: ACTIVE | OUTPATIENT
Start: 2025-03-25

## 2025-03-25 RX ORDER — CYCLOBENZAPRINE HCL 10 MG
10 TABLET ORAL 3 TIMES DAILY PRN
Qty: 90 TABLET | Refills: 1 | Status: SHIPPED | OUTPATIENT
Start: 2025-03-25 | End: 2025-04-24

## 2025-03-26 NOTE — PROGRESS NOTES
Melissa Garcia is a 54 y.o. female who presents with the following  Chief Complaint   Patient presents with    Migraine     Patient states she is having migraines daily.        HPI      FU for migraines.   Daily headaches but around 6 migraines a month.    Currently on Qulipta 60 mg for prevention of her migraines  She is also on gabapentin 800 mg 3 times daily  She uses Ubrelvy as needed- wants to up this   She has failed Imitrex, Maxalt, Zomig, Nurtec.      Her migraines are unilateral or normally located on the head  Sharp stabbing pain  Hypersensitivity light sound and smell     She has paresthesia  Neck pain with any kind of resistance or flexion and extension  No changes in lifting  No falls  She is not sure of any kind of aggravating factor other than work and being on her feet all night as this does make things worse  She is not able to get comfortable during the day  She is not able to really lift much right now  She is weak on exam still     Working night shift.         No Known Allergies    Current Outpatient Medications   Medication Sig Dispense Refill    cyclobenzaprine (FLEXERIL) 10 MG tablet Take 1 tablet by mouth 3 times daily as needed for Muscle spasms 90 tablet 1    Atogepant (QULIPTA) 60 MG TABS Take 1 tablet by mouth daily 90 tablet 1    Ubrogepant (UBRELVY) 100 MG TABS 1 at HA onset and repeat in 2 hours if needed.  Max 2 in 24 hours 16 tablet 4    gabapentin (NEURONTIN) 800 MG tablet Take 1 tablet by mouth 3 times daily for 30 days. Max Daily Amount: 2,400 mg 90 tablet 2    acetaminophen (TYLENOL) 500 MG tablet Take 2 tablets by mouth 2 times daily      Cholecalciferol 50 MCG (2000 UT) TABS Take 1 tablet by mouth      diclofenac sodium (VOLTAREN) 1 % GEL Apply 2-4 g topically 4 times daily      loratadine (CLARITIN) 10 MG tablet Take 1 tablet by mouth daily as needed       No current facility-administered medications for this visit.        Social History     Tobacco Use   Smoking Status

## 2025-04-17 NOTE — LETTER
3/27/2023 2:17 PM    Patient:  Brenna Yan   YOB: 1970  Date of Visit: 3/27/2023      Dear Ellen [address] [place] [id number]  Via Print Locally:    To Whom It May Concern,     Ms. Arianna Travis is currently under the care of [address] [place] [address] Neurology and was seen in the clinic today on 3/27/2023. Due to her current symptoms, she should be excused from work from 3/28/2023 to 6/28/2023 due to treatment plan changes. If you have any further questions please contact our office.        Sincerely,      Arnol Ramirez NP Quality 358: Patient-Centered Surgical Risk Assessment And Communication: Documentation of patient-specific risk assessment with a risk calculator based on multi-institutional clinical data, the specific risk calculator used, and communication of risk assessment from risk calculator with the patient or family. Quality 226: Preventive Care And Screening: Tobacco Use: Screening And Cessation Intervention: Patient screened for tobacco use and is an ex/non-smoker Quality 431: Preventive Care And Screening: Unhealthy Alcohol Use - Screening: Patient not identified as an unhealthy alcohol user when screened for unhealthy alcohol use using a systematic screening method Detail Level: Detailed

## 2025-04-22 ENCOUNTER — HOSPITAL ENCOUNTER (OUTPATIENT)
Facility: HOSPITAL | Age: 55
Discharge: HOME | End: 2025-04-25
Payer: COMMERCIAL

## 2025-04-22 DIAGNOSIS — R20.2 PARESTHESIA: ICD-10-CM

## 2025-04-22 DIAGNOSIS — G43.711 CHRONIC MIGRAINE WITHOUT AURA, INTRACTABLE, WITH STATUS MIGRAINOSUS: ICD-10-CM

## 2025-04-22 DIAGNOSIS — M79.601 RIGHT ARM PAIN: ICD-10-CM

## 2025-04-22 DIAGNOSIS — M43.22 FUSION OF SPINE OF CERVICAL REGION: ICD-10-CM

## 2025-04-22 DIAGNOSIS — H53.9 VISUAL CHANGES: ICD-10-CM

## 2025-04-22 PROCEDURE — 70553 MRI BRAIN STEM W/O & W/DYE: CPT

## 2025-04-22 PROCEDURE — 6360000004 HC RX CONTRAST MEDICATION: Performed by: NURSE PRACTITIONER

## 2025-04-22 PROCEDURE — A9579 GAD-BASE MR CONTRAST NOS,1ML: HCPCS | Performed by: NURSE PRACTITIONER

## 2025-04-22 PROCEDURE — 72141 MRI NECK SPINE W/O DYE: CPT

## 2025-04-22 RX ADMIN — GADOTERIDOL 18 ML: 279.3 INJECTION, SOLUTION INTRAVENOUS at 14:28

## 2025-06-10 ENCOUNTER — OFFICE VISIT (OUTPATIENT)
Age: 55
End: 2025-06-10
Payer: COMMERCIAL

## 2025-06-10 VITALS
OXYGEN SATURATION: 99 % | SYSTOLIC BLOOD PRESSURE: 133 MMHG | DIASTOLIC BLOOD PRESSURE: 70 MMHG | HEART RATE: 76 BPM | RESPIRATION RATE: 17 BRPM

## 2025-06-10 DIAGNOSIS — M54.2 NECK PAIN: ICD-10-CM

## 2025-06-10 DIAGNOSIS — M43.22 FUSION OF SPINE OF CERVICAL REGION: ICD-10-CM

## 2025-06-10 DIAGNOSIS — G43.711 CHRONIC MIGRAINE WITHOUT AURA, INTRACTABLE, WITH STATUS MIGRAINOSUS: Primary | ICD-10-CM

## 2025-06-10 DIAGNOSIS — M54.50 LUMBAR SPINE PAIN: ICD-10-CM

## 2025-06-10 PROCEDURE — 99215 OFFICE O/P EST HI 40 MIN: CPT | Performed by: NURSE PRACTITIONER

## 2025-06-10 RX ORDER — UBROGEPANT 100 MG/1
TABLET ORAL
Qty: 16 TABLET | Refills: 4 | Status: ACTIVE | OUTPATIENT
Start: 2025-06-10

## 2025-06-10 NOTE — PROGRESS NOTES
External Referral To Physical Therapy  3. Neck pain  -     External Referral To Physical Therapy  4. Lumbar spine pain  -     External Referral To Physical Therapy     Continue Qulipta for migraines  60 mg daily for prevention    She does need the Ubrelvy really prior authorization and then we will get this sent  100 mg as needed    Continue gabapentin 800 mg 3 times daily    We did discuss her MRI brain was normal  We discussed her cervical spine and the concerns with the level below the surgical site  Refer to physical therapy for this neck pain  She is also noted some low back pain and right leg pains hopefully this can help with that      This note was created using voice recognition software. Despite editing, there may be syntax errors.

## (undated) DEVICE — 3M™ TEGADERM™ TRANSPARENT FILM DRESSING FRAME STYLE, 1626W, 4 IN X 4-3/4 IN (10 CM X 12 CM), 50/CT 4CT/CASE: Brand: 3M™ TEGADERM™

## (undated) DEVICE — GOWN,SIRUS,NONRNF,SETINSLV,XL,20/CS: Brand: MEDLINE

## (undated) DEVICE — SYRINGE MED 20ML STD CLR PLAS LUERLOCK TIP N CTRL DISP

## (undated) DEVICE — ROCKER SWITCH PENCIL BLADE ELECTRODE, HOLSTER: Brand: EDGE

## (undated) DEVICE — STERILE POLYISOPRENE POWDER-FREE SURGICAL GLOVES WITH EMOLLIENT COATING: Brand: PROTEXIS

## (undated) DEVICE — PACK,BASIC,SIRUS,V: Brand: MEDLINE

## (undated) DEVICE — SOLUTION IV 1000ML 0.9% SOD CHL

## (undated) DEVICE — STRAP POS KNEE BODY VELC

## (undated) DEVICE — SYR 5ML 1/5 GRAD LL NSAF LF --

## (undated) DEVICE — DRAPE MICSCP W46XL120IN FOR ZEISS MD FEATURING CLEARLENS

## (undated) DEVICE — TRAY CATH OD16FR SIL URIN M STATLOK STBL DEV SURSTP

## (undated) DEVICE — PAD,NON-ADHERENT,3X8,STERILE,LF,1/PK: Brand: MEDLINE

## (undated) DEVICE — MAGNETIC INSTR DRAPE 20X16: Brand: MEDLINE INDUSTRIES, INC.

## (undated) DEVICE — MEDI-VAC NON-CONDUCTIVE SUCTION TUBING: Brand: CARDINAL HEALTH

## (undated) DEVICE — INFECTION CONTROL KIT SYS

## (undated) DEVICE — DRAIN KT WND 10FR RND 400ML --

## (undated) DEVICE — 3M™ TEGADERM™ TRANSPARENT FILM DRESSING FRAME STYLE, 1624W, 2-3/8 IN X 2-3/4 IN (6 CM X 7 CM), 100/CT 4CT/CASE: Brand: 3M™ TEGADERM™

## (undated) DEVICE — DRAPE XR C ARM 41X74IN LF --

## (undated) DEVICE — STRIP,CLOSURE,WOUND,MEDI-STRIP,1/2X4: Brand: MEDLINE

## (undated) DEVICE — DRAPE,CHEST,FENES,15X10,STERIL: Brand: MEDLINE

## (undated) DEVICE — SUTURE VCRL SZ 3-0 L27IN ABSRB UD L26MM SH 1/2 CIR J416H

## (undated) DEVICE — COVER,MAYO STAND,STERILE: Brand: MEDLINE

## (undated) DEVICE — REM POLYHESIVE ADULT PATIENT RETURN ELECTRODE: Brand: VALLEYLAB

## (undated) DEVICE — STERILE POLYISOPRENE POWDER-FREE SURGICAL GLOVES: Brand: PROTEXIS

## (undated) DEVICE — BONE WAX WHITE: Brand: BONE WAX WHITE

## (undated) DEVICE — TIP CLEANER: Brand: VALLEYLAB

## (undated) DEVICE — DRAPE,REIN 53X77,STERILE: Brand: MEDLINE

## (undated) DEVICE — ACCY PA100-A LEGEND LUB/DIFFUSER 4 PACK: Brand: MIDAS REX®

## (undated) DEVICE — 3M™ IOBAN™ 2 ANTIMICROBIAL INCISE DRAPE 6650EZ: Brand: IOBAN™ 2

## (undated) DEVICE — BIPOLAR FORCEPS CORD: Brand: VALLEYLAB

## (undated) DEVICE — NDL SPNE QNCKE 18GX3.5IN LF --

## (undated) DEVICE — DRAPE,UTILITY,TAPE,15X26,STERILE: Brand: MEDLINE

## (undated) DEVICE — CATHETER IV 14GA L1.25IN TEF STR HUB INTROCAN SFTY

## (undated) DEVICE — SUTURE MCRYL SZ 4-0 L27IN ABSRB UD L19MM PS-2 1/2 CIR PRIM Y426H

## (undated) DEVICE — 3M™ DURAPORE™ SURGICAL TAPE 1538-3, 3 INCH X 10 YARD (7,5CM X 9,1M), 4 ROLLS/BOX: Brand: 3M™ DURAPORE™

## (undated) DEVICE — SPONGE: SPECIALTY PEANUT XR 100/CS: Brand: MEDICAL ACTION INDUSTRIES

## (undated) DEVICE — PREP CHLORAPREP 10.5 ML ORG --

## (undated) DEVICE — SURGICAL PROCEDURE PACK BASIN MAJ SET CUST NO CAUT

## (undated) DEVICE — INSULATED BLADE ELECTRODE: Brand: EDGE

## (undated) DEVICE — TOOL 14MH30 LEGEND 14CM 3MM: Brand: MIDAS REX ™

## (undated) DEVICE — 1010 S-DRAPE TOWEL DRAPE 10/BX: Brand: STERI-DRAPE™

## (undated) DEVICE — COVER,TABLE,60X90,STERILE: Brand: MEDLINE

## (undated) DEVICE — SOLUTION IRRIG 1000ML H2O STRL BLT

## (undated) DEVICE — 3000CC GUARDIAN II: Brand: GUARDIAN

## (undated) DEVICE — BIT DRL L12MM DIA2.3MM CERV STP W/ EPOXY RNG DISP PYRENEES

## (undated) DEVICE — STOPCOCK IV 3W --

## (undated) DEVICE — CODMAN® SURGICAL PATTIES 3/4" X 3/4" (1.91CM X 1.91CM): Brand: CODMAN®

## (undated) DEVICE — COVER LT HNDL PLAS RIG 1 PER PK